# Patient Record
Sex: MALE | Race: BLACK OR AFRICAN AMERICAN | NOT HISPANIC OR LATINO | Employment: FULL TIME | URBAN - METROPOLITAN AREA
[De-identification: names, ages, dates, MRNs, and addresses within clinical notes are randomized per-mention and may not be internally consistent; named-entity substitution may affect disease eponyms.]

---

## 2023-10-03 PROBLEM — M19.90 OSTEOARTHRITIS: Status: ACTIVE | Noted: 2023-10-03

## 2023-10-03 PROBLEM — G89.29 CHRONIC PAIN OF RIGHT KNEE: Status: ACTIVE | Noted: 2023-10-03

## 2023-10-03 PROBLEM — R06.83 SNORING: Status: ACTIVE | Noted: 2023-10-03

## 2023-10-03 PROBLEM — Z01.818 PRE-TRANSPLANT EVALUATION FOR KIDNEY TRANSPLANT: Status: ACTIVE | Noted: 2023-10-03

## 2023-10-03 PROBLEM — J45.909 ASTHMA (HHS-HCC): Status: ACTIVE | Noted: 2023-10-03

## 2023-10-03 PROBLEM — Z94.9 TRANSPLANT: Status: ACTIVE | Noted: 2023-10-03

## 2023-10-03 PROBLEM — E55.9 VITAMIN D DEFICIENCY: Status: ACTIVE | Noted: 2023-10-03

## 2023-10-03 PROBLEM — E78.5 DYSLIPIDEMIA: Status: ACTIVE | Noted: 2023-10-03

## 2023-10-03 PROBLEM — E66.01 MORBID OBESITY (MULTI): Status: ACTIVE | Noted: 2023-10-03

## 2023-10-03 PROBLEM — N18.5 CKD (CHRONIC KIDNEY DISEASE), STAGE V (MULTI): Status: ACTIVE | Noted: 2023-10-03

## 2023-10-03 PROBLEM — Z95.828 ARTERIOVENOUS GRAFT FOR HEMODIALYSIS IN PLACE, PRIMARY: Status: ACTIVE | Noted: 2023-10-03

## 2023-10-03 PROBLEM — I10 HYPERTENSION: Status: ACTIVE | Noted: 2023-10-03

## 2023-10-03 PROBLEM — K21.9 ACID REFLUX: Status: ACTIVE | Noted: 2023-10-03

## 2023-10-03 PROBLEM — E11.9 TYPE 2 DIABETES MELLITUS (MULTI): Status: ACTIVE | Noted: 2023-10-03

## 2023-10-03 PROBLEM — M25.561 CHRONIC PAIN OF RIGHT KNEE: Status: ACTIVE | Noted: 2023-10-03

## 2023-10-03 RX ORDER — OXYCODONE HYDROCHLORIDE 5 MG/1
1 CAPSULE ORAL EVERY 6 HOURS PRN
COMMUNITY
Start: 2020-04-30

## 2023-10-03 RX ORDER — CLOPIDOGREL BISULFATE 75 MG/1
1 TABLET ORAL DAILY
COMMUNITY
Start: 2020-04-16 | End: 2024-02-20 | Stop reason: WASHOUT

## 2023-10-03 RX ORDER — ATORVASTATIN CALCIUM 80 MG/1
1 TABLET, FILM COATED ORAL DAILY
COMMUNITY
Start: 2018-03-12

## 2023-10-03 RX ORDER — SUCROFERRIC OXYHYDROXIDE 500 MG/1
500 TABLET, CHEWABLE ORAL
COMMUNITY
Start: 2022-06-08 | End: 2024-02-20 | Stop reason: WASHOUT

## 2023-10-03 RX ORDER — SEVELAMER CARBONATE 800 MG/1
2 TABLET, FILM COATED ORAL
COMMUNITY
Start: 2021-07-08 | End: 2024-02-20 | Stop reason: WASHOUT

## 2023-10-03 RX ORDER — CARVEDILOL 12.5 MG/1
1 TABLET ORAL 2 TIMES DAILY
COMMUNITY
Start: 2019-10-18 | End: 2023-11-02 | Stop reason: ALTCHOICE

## 2023-10-03 RX ORDER — SODIUM BICARBONATE 650 MG/1
2 TABLET ORAL 2 TIMES DAILY
COMMUNITY
Start: 2021-06-23

## 2023-10-03 RX ORDER — ALBUTEROL SULFATE 90 UG/1
2 AEROSOL, METERED RESPIRATORY (INHALATION) EVERY 4 HOURS PRN
COMMUNITY
Start: 2017-11-13

## 2023-10-03 RX ORDER — ATORVASTATIN CALCIUM 40 MG/1
1 TABLET, FILM COATED ORAL NIGHTLY
COMMUNITY
Start: 2018-03-12 | End: 2023-11-02 | Stop reason: ALTCHOICE

## 2023-10-03 RX ORDER — ZOLPIDEM TARTRATE 5 MG/1
0.5 TABLET ORAL NIGHTLY PRN
COMMUNITY
Start: 2021-07-17

## 2023-10-03 RX ORDER — LOSARTAN POTASSIUM 100 MG/1
50 TABLET ORAL DAILY
COMMUNITY

## 2023-10-03 RX ORDER — OMEPRAZOLE 40 MG/1
1 CAPSULE, DELAYED RELEASE ORAL DAILY
COMMUNITY
Start: 2021-07-15 | End: 2023-11-02 | Stop reason: ALTCHOICE

## 2023-10-03 RX ORDER — PANTOPRAZOLE SODIUM 20 MG/1
20 TABLET, DELAYED RELEASE ORAL 2 TIMES DAILY
COMMUNITY

## 2023-10-03 RX ORDER — HYDRALAZINE HYDROCHLORIDE 100 MG/1
1 TABLET, FILM COATED ORAL EVERY 12 HOURS
COMMUNITY
Start: 2021-07-08

## 2023-10-03 RX ORDER — PEN NEEDLE, DIABETIC 29 G X1/2"
NEEDLE, DISPOSABLE MISCELLANEOUS
COMMUNITY

## 2023-10-03 RX ORDER — ALBUTEROL SULFATE 0.63 MG/3ML
0.63 SOLUTION RESPIRATORY (INHALATION) EVERY 6 HOURS PRN
COMMUNITY
Start: 2021-09-29

## 2023-10-03 RX ORDER — AMLODIPINE BESYLATE 10 MG/1
1 TABLET ORAL DAILY
COMMUNITY
Start: 2020-02-03

## 2023-10-03 RX ORDER — TORSEMIDE 100 MG/1
1 TABLET ORAL DAILY
COMMUNITY
Start: 2021-12-09 | End: 2024-02-20 | Stop reason: WASHOUT

## 2023-10-03 RX ORDER — LANCETS
1 EACH MISCELLANEOUS 2 TIMES DAILY
COMMUNITY

## 2023-10-03 RX ORDER — BLOOD SUGAR DIAGNOSTIC
1 STRIP MISCELLANEOUS 4 TIMES DAILY
COMMUNITY
Start: 2019-03-22

## 2023-10-03 RX ORDER — MULTIVITAMIN
TABLET ORAL
COMMUNITY
End: 2024-02-20 | Stop reason: WASHOUT

## 2023-10-03 RX ORDER — CARVEDILOL 3.12 MG/1
1 TABLET ORAL 2 TIMES DAILY
COMMUNITY
End: 2023-11-02 | Stop reason: ALTCHOICE

## 2023-10-03 RX ORDER — ASPIRIN 325 MG
1 TABLET, DELAYED RELEASE (ENTERIC COATED) ORAL
COMMUNITY
Start: 2017-12-18 | End: 2023-11-02 | Stop reason: ALTCHOICE

## 2023-10-05 ENCOUNTER — APPOINTMENT (OUTPATIENT)
Dept: CARDIOLOGY | Facility: CLINIC | Age: 38
End: 2023-10-05

## 2023-11-02 ENCOUNTER — OFFICE VISIT (OUTPATIENT)
Dept: CARDIOLOGY | Facility: CLINIC | Age: 38
End: 2023-11-02
Payer: COMMERCIAL

## 2023-11-02 VITALS
HEART RATE: 78 BPM | DIASTOLIC BLOOD PRESSURE: 70 MMHG | HEIGHT: 72 IN | WEIGHT: 315 LBS | OXYGEN SATURATION: 95 % | SYSTOLIC BLOOD PRESSURE: 147 MMHG | BODY MASS INDEX: 42.66 KG/M2

## 2023-11-02 DIAGNOSIS — N18.5 CKD (CHRONIC KIDNEY DISEASE), STAGE V (MULTI): ICD-10-CM

## 2023-11-02 DIAGNOSIS — Z01.810 PREOP CARDIOVASCULAR EXAM: ICD-10-CM

## 2023-11-02 DIAGNOSIS — I10 PRIMARY HYPERTENSION: ICD-10-CM

## 2023-11-02 DIAGNOSIS — Z01.818 PRE-TRANSPLANT EVALUATION FOR KIDNEY TRANSPLANT: Primary | ICD-10-CM

## 2023-11-02 PROCEDURE — 93010 ELECTROCARDIOGRAM REPORT: CPT | Performed by: INTERNAL MEDICINE

## 2023-11-02 PROCEDURE — 3077F SYST BP >= 140 MM HG: CPT | Performed by: INTERNAL MEDICINE

## 2023-11-02 PROCEDURE — 99214 OFFICE O/P EST MOD 30 MIN: CPT | Performed by: INTERNAL MEDICINE

## 2023-11-02 PROCEDURE — 93005 ELECTROCARDIOGRAM TRACING: CPT | Performed by: INTERNAL MEDICINE

## 2023-11-02 PROCEDURE — 4010F ACE/ARB THERAPY RXD/TAKEN: CPT | Performed by: INTERNAL MEDICINE

## 2023-11-02 PROCEDURE — 3078F DIAST BP <80 MM HG: CPT | Performed by: INTERNAL MEDICINE

## 2023-11-02 RX ORDER — PREDNISONE 10 MG/1
TABLET ORAL
COMMUNITY
Start: 2023-01-11 | End: 2024-02-20 | Stop reason: WASHOUT

## 2023-11-02 RX ORDER — SEMAGLUTIDE 0.68 MG/ML
0.25 INJECTION, SOLUTION SUBCUTANEOUS
COMMUNITY
Start: 2023-09-10

## 2023-11-02 RX ORDER — TIZANIDINE 4 MG/1
4 TABLET ORAL EVERY 8 HOURS PRN
COMMUNITY
Start: 2023-01-11 | End: 2024-02-20 | Stop reason: WASHOUT

## 2023-11-02 RX ORDER — ERGOCALCIFEROL 1.25 MG/1
CAPSULE ORAL
COMMUNITY
Start: 2023-07-19

## 2023-11-02 RX ORDER — INSULIN GLARGINE-YFGN 100 [IU]/ML
INJECTION, SOLUTION SUBCUTANEOUS
COMMUNITY
Start: 2023-10-25

## 2023-11-02 RX ORDER — CARVEDILOL 25 MG/1
25 TABLET ORAL
COMMUNITY

## 2023-11-02 ASSESSMENT — COLUMBIA-SUICIDE SEVERITY RATING SCALE - C-SSRS
2. HAVE YOU ACTUALLY HAD ANY THOUGHTS OF KILLING YOURSELF?: NO
6. HAVE YOU EVER DONE ANYTHING, STARTED TO DO ANYTHING, OR PREPARED TO DO ANYTHING TO END YOUR LIFE?: NO
1. IN THE PAST MONTH, HAVE YOU WISHED YOU WERE DEAD OR WISHED YOU COULD GO TO SLEEP AND NOT WAKE UP?: NO

## 2023-11-02 ASSESSMENT — PATIENT HEALTH QUESTIONNAIRE - PHQ9
SUM OF ALL RESPONSES TO PHQ9 QUESTIONS 1 AND 2: 0
1. LITTLE INTEREST OR PLEASURE IN DOING THINGS: NOT AT ALL
2. FEELING DOWN, DEPRESSED OR HOPELESS: NOT AT ALL

## 2023-11-02 ASSESSMENT — PAIN SCALES - GENERAL: PAINLEVEL: 0-NO PAIN

## 2023-11-02 ASSESSMENT — ENCOUNTER SYMPTOMS
OCCASIONAL FEELINGS OF UNSTEADINESS: 0
LOSS OF SENSATION IN FEET: 0
DEPRESSION: 0

## 2023-11-02 NOTE — PROGRESS NOTES
Subjective   Tanmay Mcneill is a 38 y.o. male who presents to the [facility] for cardiac preoperative evaluation for kidney transplant.     He has no active cardiac symptoms of chest pain, dyspnea on exertion, PND, orthopnea, DRAKE, palpitations, syncope, or claudication.     Exercises routinely and works as a  with exertion > 4 METs with moving objects at work and climbing stairs.    Past Medical History:  1. ESRD on HD  2. Hypertension  3. Dyslipidemia  4. Type 2 diabetes mellitus  5.     Social History:  Former tobacco use, quit at age 30 yo    Family History:  Family History   Problem Relation Name Age of Onset    COPD Mother      Diabetes Mother      Hypertension Mother      Kidney failure Father      Kidney disease Father          stage IV       Review of Systems    A 14 point review of systems was asked. All questions were negative except for pertinent positives listed in the HPI.     Current Outpatient Medications on File Prior to Visit   Medication Sig Dispense Refill    albuterol (Ventolin HFA) 90 mcg/actuation inhaler Inhale 2 puffs every 4 hours if needed.      albuterol 0.63 mg/3 mL nebulizer solution Take 3 mL (0.63 mg) by nebulization every 6 hours if needed for wheezing or shortness of breath.      amLODIPine (Norvasc) 10 mg tablet Take 1 tablet (10 mg) by mouth once daily.      atorvastatin (Lipitor) 80 mg tablet Take 1 tablet (80 mg) by mouth once daily.      blood sugar diagnostic (Accu-Chek Celi Plus test strp) strip 1 strip 4 times a day.      carvedilol (Coreg) 25 mg tablet Take 1 tablet (25 mg) by mouth 2 times a day with meals.      clopidogrel (Plavix) 75 mg tablet Take 1 tablet (75 mg) by mouth once daily.      ergocalciferol (Vitamin D-2) 1.25 MG (81616 UT) capsule TAKE 1 CAPSULE BY MOUTH ONCE A WEEK FOR 16 DOSES      hydrALAZINE (Apresoline) 100 mg tablet Take 1 tablet (100 mg) by mouth every 12 hours.      lancets (OneTouch UltraSoft Lancets) misc 1 Lancet 2  "times a day.      losartan (Cozaar) 100 mg tablet Take 1 tablet (100 mg) by mouth once daily.      multivitamin (Daily Multi-Vitamin) tablet Take by mouth.      Ozempic 0.25 mg or 0.5 mg (2 mg/3 mL) pen injector Inject 0.25 mg under the skin 1 (one) time per week.      pen needle, diabetic 31 gauge x 1/4\" needle       Semglee,insulin glarg-yfgn,Pen 100 unit/mL (3 mL) Pen       sodium bicarbonate 650 mg tablet Take 2 tablets (1,300 mg) by mouth 2 times a day.      torsemide (Demadex) 100 mg tablet Take 1 tablet (100 mg) by mouth once daily.      zolpidem (Ambien) 5 mg tablet Take 0.5 tablets (2.5 mg) by mouth as needed at bedtime.      FUROSEMIDE ORAL Take by mouth twice a day.      oxyCODONE (Oxy-IR) 5 mg immediate release capsule Take 1 capsule (5 mg) by mouth every 6 hours if needed.      pantoprazole (ProtoNix) 20 mg EC tablet Take 1 tablet (20 mg) by mouth twice a day.      predniSONE (Deltasone) 10 mg tablet       sevelamer carbonate (Renvela) 800 mg tablet Take 2 tablets (1,600 mg) by mouth 3 times a day with meals.      sucroferric oxyhydroxide (Velphoro) 500 mg tablet,chewable chewable tablet Chew.      tiZANidine (Zanaflex) 4 mg tablet Take 1 tablet (4 mg) by mouth every 8 hours if needed.      [DISCONTINUED] atorvastatin (Lipitor) 40 mg tablet Take 1 tablet (40 mg) by mouth once daily at bedtime.      [DISCONTINUED] carvedilol (Coreg) 12.5 mg tablet Take 1 tablet (12.5 mg) by mouth 2 times a day.      [DISCONTINUED] carvedilol (Coreg) 3.125 mg tablet Take 1 tablet (3.125 mg) by mouth twice a day.      [DISCONTINUED] cholecalciferol (Vitamin D-3) 1,250 mcg (50,000 unit) capsule Take 1 capsule (50,000 Units) by mouth every 30 (thirty) days.      [DISCONTINUED] omeprazole (PriLOSEC) 40 mg DR capsule Take 1 capsule (40 mg) by mouth once daily.       No current facility-administered medications on file prior to visit.          Objective   Physical Exam  BP Readings from Last 3 Encounters:   11/02/23 147/70 " "  22 155/83   10/29/21 (!) 185/95      Wt Readings from Last 3 Encounters:   23 (!) 152 kg (335 lb 8 oz)   22 (!) 149 kg (328 lb)   10/29/21 (!) 158 kg (348 lb 9 oz)      BMI: Estimated body mass index is 45.5 kg/m² as calculated from the following:    Height as of this encounter: 1.829 m (6').    Weight as of this encounter: 152 kg (335 lb 8 oz).  BSA: Estimated body surface area is 2.78 meters squared as calculated from the following:    Height as of this encounter: 1.829 m (6').    Weight as of this encounter: 152 kg (335 lb 8 oz).    General: no acute distress  HEENT: EOMI, no scleral icterus.  Lungs: Clear to auscultation bilaterally without wheezing, rales, or rhonchi.  Cardiovascular: Regular rhythm and rate. Normal S1 and S2. No murmurs, rubs, or gallops are appreciated. JVP normal.  Abdomen: Soft, nontender, nondistended. Bowel sounds present.  Extremities: Warm and well perfused with equal 2+ pulses bilaterally.  No edema present.  Neurologic: Alert and oriented x3.    I have personally reviewed the following images and laboratory findings:  Last echocardiogram: 2023 echo: LV EF 60-65%, mild conc LVH (LVMI 119 gm/m2), indeterminate vs pseudonormal diastology (E/e' 16), normal LA size (ORIN 27 ml/m2), normal RV/RA, no AI, trace MR, mild TR, RVSP 34 mm Hg (RAP 8 mm Hg)    Last cath / stress test: 2023 SPECT nuclear: \"Questionable mild fixed perfusion defect involving the mid to basal  lateral wall suggestive of prior infarct. Additional mild fixed  perfusion defect involving the inferior wall may be related to bowel  artifact. Prior infarct cannot be excluded.\"    2022 SPECT nuclear stress test: No myocardial ischemia or scar. LV EF 51%    2021 CT calcium score: zero    Most recent EC2023 ECG: Sinus rhythm, 79 bpm, normal ECG.     Assessment/Plan   1. Preoperative evaluation:  SPECT nuclear 2023 study with artifact vs new silent cardiac infarcts in inferior " and anterior wall of the heart. This would be unusually for 2021 CT calcium score zero but has multiple CAD risk factors.    I favor ordering a stress MRI cardiac protocol instead of coronary angiogram to assess. If there is inferior/anterior wall scarring this will present as endomyocardial pattern late gadolinium enhancement on MRI.    If cardiac MRI imaging is normal, Mr. Mcneill is a low risk patient for renal transplant surgery from a heart standpoint. Normal heart exam today. Able to perform physical activity > 4 METs.        SIGNATURE: Robin Villegas MD PATIENT NAME: Tanmay Mcneill   DATE/TIME: November 2, 2023 3:46 PM MRN: [id number]

## 2023-11-13 LAB
ATRIAL RATE: 79 BPM
P AXIS: 85 DEGREES
P OFFSET: 201 MS
P ONSET: 144 MS
PR INTERVAL: 154 MS
Q ONSET: 221 MS
QRS COUNT: 13 BEATS
QRS DURATION: 82 MS
QT INTERVAL: 394 MS
QTC CALCULATION(BAZETT): 451 MS
QTC FREDERICIA: 432 MS
R AXIS: 82 DEGREES
T AXIS: 40 DEGREES
T OFFSET: 418 MS
VENTRICULAR RATE: 79 BPM

## 2023-12-19 ENCOUNTER — HOSPITAL ENCOUNTER (OUTPATIENT)
Dept: RADIOLOGY | Facility: HOSPITAL | Age: 38
End: 2023-12-19
Payer: COMMERCIAL

## 2024-01-15 ENCOUNTER — TELEPHONE (OUTPATIENT)
Dept: TRANSPLANT | Facility: HOSPITAL | Age: 39
End: 2024-01-15
Payer: COMMERCIAL

## 2024-01-15 NOTE — TELEPHONE ENCOUNTER
Talked to pt today, explained the GFR letter - his stated he did not gain more time.    He has his upcoming cMRI at the beginning of February.    He is aware he needs updated provider visits, these were tasked out to CHARLENE Catalan.   Retinoid Dermatitis Aggressive Treatment: I recommended more frequent application of Cetaphil or CeraVe to the areas of dermatitis. I also prescribed a topical steroid for twice daily use until the dermatitis resolves.

## 2024-01-16 ENCOUNTER — TELEPHONE (OUTPATIENT)
Dept: TRANSPLANT | Facility: HOSPITAL | Age: 39
End: 2024-01-16
Payer: COMMERCIAL

## 2024-02-06 ENCOUNTER — HOSPITAL ENCOUNTER (OUTPATIENT)
Dept: RADIOLOGY | Facility: HOSPITAL | Age: 39
End: 2024-02-06

## 2024-02-20 ENCOUNTER — OFFICE VISIT (OUTPATIENT)
Dept: TRANSPLANT | Facility: HOSPITAL | Age: 39
End: 2024-02-20
Payer: COMMERCIAL

## 2024-02-20 ENCOUNTER — DOCUMENTATION (OUTPATIENT)
Dept: TRANSPLANT | Facility: HOSPITAL | Age: 39
End: 2024-02-20
Payer: COMMERCIAL

## 2024-02-20 VITALS
WEIGHT: 315 LBS | OXYGEN SATURATION: 97 % | SYSTOLIC BLOOD PRESSURE: 152 MMHG | HEART RATE: 79 BPM | DIASTOLIC BLOOD PRESSURE: 79 MMHG | BODY MASS INDEX: 47.44 KG/M2 | TEMPERATURE: 97.9 F

## 2024-02-20 VITALS
OXYGEN SATURATION: 97 % | TEMPERATURE: 97.9 F | HEART RATE: 79 BPM | BODY MASS INDEX: 47.44 KG/M2 | DIASTOLIC BLOOD PRESSURE: 79 MMHG | WEIGHT: 315 LBS | SYSTOLIC BLOOD PRESSURE: 152 MMHG

## 2024-02-20 DIAGNOSIS — N18.6 ESRD (END STAGE RENAL DISEASE) (MULTI): Primary | ICD-10-CM

## 2024-02-20 DIAGNOSIS — Z01.818 PRE-TRANSPLANT EVALUATION FOR KIDNEY TRANSPLANT: ICD-10-CM

## 2024-02-20 PROCEDURE — 1036F TOBACCO NON-USER: CPT | Performed by: INTERNAL MEDICINE

## 2024-02-20 PROCEDURE — 3078F DIAST BP <80 MM HG: CPT | Performed by: INTERNAL MEDICINE

## 2024-02-20 PROCEDURE — 4010F ACE/ARB THERAPY RXD/TAKEN: CPT | Performed by: TRANSPLANT SURGERY

## 2024-02-20 PROCEDURE — 99215 OFFICE O/P EST HI 40 MIN: CPT | Performed by: TRANSPLANT SURGERY

## 2024-02-20 PROCEDURE — 99215 OFFICE O/P EST HI 40 MIN: CPT | Performed by: INTERNAL MEDICINE

## 2024-02-20 PROCEDURE — 3077F SYST BP >= 140 MM HG: CPT | Performed by: TRANSPLANT SURGERY

## 2024-02-20 PROCEDURE — 1036F TOBACCO NON-USER: CPT | Performed by: TRANSPLANT SURGERY

## 2024-02-20 PROCEDURE — 3078F DIAST BP <80 MM HG: CPT | Performed by: TRANSPLANT SURGERY

## 2024-02-20 PROCEDURE — 4010F ACE/ARB THERAPY RXD/TAKEN: CPT | Performed by: INTERNAL MEDICINE

## 2024-02-20 PROCEDURE — 3077F SYST BP >= 140 MM HG: CPT | Performed by: INTERNAL MEDICINE

## 2024-02-20 SDOH — ECONOMIC STABILITY: HOUSING INSECURITY
IN THE LAST 12 MONTHS, WAS THERE A TIME WHEN YOU DID NOT HAVE A STEADY PLACE TO SLEEP OR SLEPT IN A SHELTER (INCLUDING NOW)?: NO

## 2024-02-20 SDOH — ECONOMIC STABILITY: TRANSPORTATION INSECURITY
IN THE PAST 12 MONTHS, HAS THE LACK OF TRANSPORTATION KEPT YOU FROM MEDICAL APPOINTMENTS OR FROM GETTING MEDICATIONS?: NO

## 2024-02-20 SDOH — ECONOMIC STABILITY: FOOD INSECURITY: WITHIN THE PAST 12 MONTHS, THE FOOD YOU BOUGHT JUST DIDN'T LAST AND YOU DIDN'T HAVE MONEY TO GET MORE.: NEVER TRUE

## 2024-02-20 SDOH — ECONOMIC STABILITY: INCOME INSECURITY: IN THE LAST 12 MONTHS, WAS THERE A TIME WHEN YOU WERE NOT ABLE TO PAY THE MORTGAGE OR RENT ON TIME?: NO

## 2024-02-20 SDOH — ECONOMIC STABILITY: FOOD INSECURITY: WITHIN THE PAST 12 MONTHS, YOU WORRIED THAT YOUR FOOD WOULD RUN OUT BEFORE YOU GOT MONEY TO BUY MORE.: NEVER TRUE

## 2024-02-20 SDOH — ECONOMIC STABILITY: TRANSPORTATION INSECURITY
IN THE PAST 12 MONTHS, HAS LACK OF TRANSPORTATION KEPT YOU FROM MEETINGS, WORK, OR FROM GETTING THINGS NEEDED FOR DAILY LIVING?: NO

## 2024-02-20 SDOH — ECONOMIC STABILITY: HOUSING INSECURITY: IN THE LAST 12 MONTHS, HOW MANY PLACES HAVE YOU LIVED?: 1

## 2024-02-20 ASSESSMENT — ENCOUNTER SYMPTOMS
ABDOMINAL DISTENTION: 0
FEVER: 0
ARTHRALGIAS: 0
LIGHT-HEADEDNESS: 0
FREQUENCY: 0
ADENOPATHY: 0
ABDOMINAL PAIN: 0
CONFUSION: 0
COUGH: 0
HALLUCINATIONS: 0
DIARRHEA: 0
EYES NEGATIVE: 1
HEMATURIA: 0
DIZZINESS: 0
CHILLS: 0
COLOR CHANGE: 0
WEAKNESS: 0
CONSTIPATION: 0
DYSURIA: 0
AGITATION: 0
SHORTNESS OF BREATH: 0

## 2024-02-20 ASSESSMENT — LIFESTYLE VARIABLES: HOW OFTEN DO YOU HAVE A DRINK CONTAINING ALCOHOL: NEVER

## 2024-02-20 ASSESSMENT — PAIN SCALES - GENERAL
PAINLEVEL: 0-NO PAIN
PAINLEVEL: 0-NO PAIN

## 2024-02-20 ASSESSMENT — SOCIAL DETERMINANTS OF HEALTH (SDOH): HOW HARD IS IT FOR YOU TO PAY FOR THE VERY BASICS LIKE FOOD, HOUSING, MEDICAL CARE, AND HEATING?: NOT HARD AT ALL

## 2024-02-20 NOTE — PROGRESS NOTES
Patient attended appointment on 02/20/2024 with Dr. Feliz and Dr. Velasco.  Medications and allergies reviewed with the patient.  Patient ambulated.  Patient is tolerating dialysis well.  Health screenings reviewed.   Recent hospitalizations:  No.  Blood transfusions:  No.  Falls:  No.    Comments:  Pt will get updated labs and CXR this week.  Will review cardiac protocol for any further testing he needs in that respect.  He will be scheduled for a SW visit also.

## 2024-02-21 NOTE — PROGRESS NOTES
Subjective   Tanmay Mcneill is a 38 y.o. male who presents for continued evaluation for kidney transplant. He was initially seen 2 years ago. He has done some cardiac testing and met a cardiologist. Summary below. He denies any chest pain or shortness of breath.  Since HD, he stated his insulin requirement has reduced to minimal.  He is losing some weight and recently started Ozempic      Review of Systems   Constitutional:  Negative for chills and fever.   HENT: Negative.  Negative for congestion.    Eyes: Negative.    Respiratory:  Negative for cough and shortness of breath.    Cardiovascular:  Negative for chest pain.   Gastrointestinal:  Negative for abdominal distention, abdominal pain, constipation and diarrhea.   Endocrine: Negative for cold intolerance and heat intolerance.   Genitourinary:  Negative for dysuria, frequency, hematuria and urgency.   Musculoskeletal:  Negative for arthralgias.   Skin:  Negative for color change.   Allergic/Immunologic: Negative for environmental allergies.   Neurological:  Negative for dizziness, weakness and light-headedness.   Hematological:  Negative for adenopathy.   Psychiatric/Behavioral:  Negative for agitation, confusion and hallucinations.         Objective   Vitals:    02/20/24 1106   BP: 152/79   Pulse: 79   Temp: 36.6 °C (97.9 °F)   SpO2: 97%       Physical Exam  Constitutional:       Appearance: Normal appearance.   HENT:      Head: Normocephalic and atraumatic.      Nose: Nose normal.   Eyes:      Pupils: Pupils are equal, round, and reactive to light.   Cardiovascular:      Rate and Rhythm: Normal rate.   Pulmonary:      Effort: Pulmonary effort is normal. No respiratory distress.   Abdominal:      General: There is no distension.      Palpations: Abdomen is soft. There is no mass.      Comments: Truncal obesity  Weight distributed mostly in the abdomen   Musculoskeletal:         General: No swelling. Normal range of motion.      Cervical back: Normal range of  "motion.   Skin:     General: Skin is warm and dry.   Neurological:      General: No focal deficit present.      Mental Status: He is alert and oriented to person, place, and time.   Psychiatric:         Mood and Affect: Mood normal.         Behavior: Behavior normal.          Lab Review    Blood Type: No results found for: \"ABORH\"  Lab Results   Component Value Date    CREATININE 15.17 (H) 10/29/2021    K 5.0 06/23/2020    GLUCOSE 116 (H) 06/23/2020    HCT 22.2 (L) 01/21/2022    WBC 8.5 10/29/2021     10/29/2021    CALCIUM 7.9 (L) 06/23/2020     Lab Results   Component Value Date    WBC 8.5 10/29/2021    HGB 6.5 (LL) 01/21/2022    HCT 22.2 (L) 01/21/2022    MCV 76 (L) 10/29/2021     10/29/2021     Lab Results   Component Value Date    GLUCOSE 116 (H) 06/23/2020    CALCIUM 7.9 (L) 06/23/2020     06/23/2020    K 5.0 06/23/2020    CO2 22 06/23/2020     (H) 06/23/2020    BUN 71 (H) 10/29/2021    CREATININE 15.17 (H) 10/29/2021       Current Outpatient Medications:     albuterol (Ventolin HFA) 90 mcg/actuation inhaler, Inhale 2 puffs every 4 hours if needed., Disp: , Rfl:     albuterol 0.63 mg/3 mL nebulizer solution, Take 3 mL (0.63 mg) by nebulization every 6 hours if needed for wheezing or shortness of breath., Disp: , Rfl:     amLODIPine (Norvasc) 10 mg tablet, Take 1 tablet (10 mg) by mouth once daily., Disp: , Rfl:     atorvastatin (Lipitor) 80 mg tablet, Take 1 tablet (80 mg) by mouth once daily., Disp: , Rfl:     blood sugar diagnostic (Accu-Chek Celi Plus test strp) strip, 1 strip 4 times a day., Disp: , Rfl:     carvedilol (Coreg) 25 mg tablet, Take 1 tablet (25 mg) by mouth 2 times a day with meals., Disp: , Rfl:     ergocalciferol (Vitamin D-2) 1.25 MG (97628 UT) capsule, TAKE 1 CAPSULE BY MOUTH ONCE A WEEK FOR 16 DOSES, Disp: , Rfl:     FUROSEMIDE ORAL, Take by mouth twice a day., Disp: , Rfl:     hydrALAZINE (Apresoline) 100 mg tablet, Take 1 tablet (100 mg) by mouth every 12 " "hours., Disp: , Rfl:     lancets (OneTouch UltraSoft Lancets) misc, 1 Lancet 2 times a day., Disp: , Rfl:     losartan (Cozaar) 100 mg tablet, Take 0.5 tablets (50 mg) by mouth once daily., Disp: , Rfl:     oxyCODONE (Oxy-IR) 5 mg immediate release capsule, Take 1 capsule (5 mg) by mouth every 6 hours if needed., Disp: , Rfl:     Ozempic 0.25 mg or 0.5 mg (2 mg/3 mL) pen injector, Inject 0.25 mg under the skin 1 (one) time per week., Disp: , Rfl:     pantoprazole (ProtoNix) 20 mg EC tablet, Take 1 tablet (20 mg) by mouth twice a day., Disp: , Rfl:     pen needle, diabetic 31 gauge x 1/4\" needle, , Disp: , Rfl:     Semglee,insulin glarg-yfgn,Pen 100 unit/mL (3 mL) Pen, , Disp: , Rfl:     sodium bicarbonate 650 mg tablet, Take 2 tablets (1,300 mg) by mouth 2 times a day., Disp: , Rfl:     sucroferric oxyhydroxide (Velphoro) 500 mg tablet,chewable chewable tablet, Chew 1 tablet (500 mg) 3 times a day., Disp: , Rfl:     zolpidem (Ambien) 5 mg tablet, Take 0.5 tablets (2.5 mg) by mouth as needed at bedtime., Disp: , Rfl:      2021  Coronary artery calcium 0     Echocardiogram 8/1/2023  CONCLUSIONS:  1. Left ventricular systolic function is normal with a 60-65% estimated ejection fraction.  2. Spectral Doppler shows a pseudonormal pattern of left ventricular diastolic filling.  3. There is mild eccentric left ventricular hypertrophy.  4. Mildly elevated RVSP.  5. The maximum TR velocity (and hence, PA systolic pressure) is likely underestimated.    Nuclear stress Test 7/20/2023, Regadenoson  IMPRESSION:  Questionable mild fixed perfusion defect involving the mid to basal  lateral wall suggestive of prior infarct. Additional mild fixed  perfusion defect involving the inferior wall may be related to bowel  artifact. Prior infarct cannot be excluded.     The left ventricle is moderately dilated with EDV of 165 mL,  previously 226 mL.     Normal LV wall motion with an LV EF estimated a post-stress 52%    From Dr. Villegas " (cardiology)  1. Preoperative evaluation:  SPECT nuclear 7/20/2023 study with artifact vs new silent cardiac infarcts in inferior and anterior wall of the heart. This would be unusually for 2021 CT calcium score zero but has multiple CAD risk factors.  I favor ordering a stress MRI cardiac protocol instead of coronary angiogram to assess. If there is inferior/anterior wall scarring this will present as endomyocardial pattern late gadolinium enhancement on MRI.  If cardiac MRI imaging is normal, Mr. Mcneill is a low risk patient for renal transplant surgery from a heart standpoint. Normal heart exam today. Able to perform physical activity > 4 METs.     Stress MRI with Regadenoson: Pending      Assessment/Plan    Diagnoses: ESRD  Tanmay Mcneill is a reasonable candidate for kidney transplant.    He has lost some weight with BMI 44 from 46 prior. He just started on Ozempic which will further aid his weight loss. There is central obesity but body habitus still feasible for transplant.  His cardiac work up is near complete, results summarized above. Need stress MRI for final clearance.

## 2024-02-22 ENCOUNTER — DOCUMENTATION (OUTPATIENT)
Dept: TRANSPLANT | Facility: HOSPITAL | Age: 39
End: 2024-02-22
Payer: COMMERCIAL

## 2024-02-22 NOTE — PROGRESS NOTES
Tasked out testing for assistance with scheduling today.    Pt has already seen cardiology - they will comment on his risk strat after the cMRI is completed.

## 2024-02-22 NOTE — PROGRESS NOTES
TRANSPLANT NEPHROLOGY CONSULT :   KIDNEY TRANSPLANT RECIPIENT EVALUATION        SERVICE DATE: 02/20/2024     REASON FOR CONSULT/CHIEF COMPLAINT:    FOR KIDNEY TRANSPLANT RECIPIENT EVALUATION.    HPI:    Mr. Mcneill is a 38 y.o. male with ESRD sec to DM2, on HD since 1/19/2022 ([facility]– [place], JOSE, BASILIO DUARTE, Dr. Rhodes), HTN, obesity, asthma, arthritis and other PMH as listed below here for pre-transplant eval.     Pt was last seen by renal for pre-txp eval in 10/2021. Pt was deemed suitable candidate at the time. Was advised to lose wt. BMI at the time ~46.    Pt has been listed as status 7 since 9/2022.    Pt with h/o DM x12yrs, HTN X7 yrs. Gradual decline in renal function.     Tolerating HD well currently. No recent ER visits or admission.     Recently started on Ozempic. Lost approx 35 lbs wt. Most recent BMI 47.   Last A1C 7.6% 6/2023.    Has had cardiac eval in the interim. Seen by Dr. Villegas with cardiology who recommended cardiac stress MRI for further eval of mild perfusion defects in basal lateral and inferior walls seen on nuclear stress test 7/2023. Pt was deemed overall low risk.     Pt currently w/o any cardiac symptoms. Denies any chest pain, BEAULIEU, orthopnea, PND, palpitations, persistent LE swelling.     No potential donors at this time.     Quit smoking in 2020. Reports occ alcohol use. No other substance abuse reported.     Makes very little urine. Inter-dialytic wt gains of 2-3 kg consistently. Able to get down to dry wt without difficulty.     Fully functional. Able to perform all ADLs, IADLs.    The patient is here for kidney transplant recipient evaluation. Mr. Mcneill has had multiple complications from end stage severe renal disease including anemia, secondary hyperparathyroidism, and osteodystrophy. The patient is here today for an evaluation for kidney transplantation to improve quality of life and decrease the risk of cardiovascular disease, coronary artery disease and stroke.     The  patient is doing well without complaints. Denied chest pain, shortness of breath, palpitation, dyspnea on exertion, dysuria, fever, nausea, vomiting, diarrhea and flu-liked symptoms. No swelling of the extremities. No recent hospitalization or ED visit.      ROS:  Review of  14 systems was performed system by system. See HPI. Otherwise, the symptoms were negative.    PAST MEDICAL HISTORY:  No past medical history on file.     PAST SURGICAL HISTORY:  Past Surgical History:   Procedure Laterality Date    OTHER SURGICAL HISTORY  12/18/2017    History Of Prior Surgery    OTHER SURGICAL HISTORY  04/08/2020    Hip surgery        SOCIAL HISTORY:  Social History     Socioeconomic History    Marital status: Single     Spouse name: Not on file    Number of children: Not on file    Years of education: Not on file    Highest education level: Not on file   Occupational History    Not on file   Tobacco Use    Smoking status: Never     Passive exposure: Never    Smokeless tobacco: Never   Vaping Use    Vaping Use: Never used   Substance and Sexual Activity    Alcohol use: Never    Drug use: Never    Sexual activity: Not on file   Other Topics Concern    Not on file   Social History Narrative    Not on file     Social Determinants of Health     Financial Resource Strain: Low Risk  (2/20/2024)    Overall Financial Resource Strain (CARDIA)     Difficulty of Paying Living Expenses: Not hard at all   Food Insecurity: No Food Insecurity (2/20/2024)    Hunger Vital Sign     Worried About Running Out of Food in the Last Year: Never true     Ran Out of Food in the Last Year: Never true   Transportation Needs: No Transportation Needs (2/20/2024)    PRAPARE - Transportation     Lack of Transportation (Medical): No     Lack of Transportation (Non-Medical): No   Physical Activity: Not on file   Stress: Not on file   Social Connections: Not on file   Intimate Partner Violence: Not on file   Housing Stability: Low Risk  (2/20/2024)    Housing  "Stability Vital Sign     Unable to Pay for Housing in the Last Year: No     Number of Places Lived in the Last Year: 1     Unstable Housing in the Last Year: No       FAMILY HISTORY:  Family History   Problem Relation Name Age of Onset    COPD Mother      Diabetes Mother      Hypertension Mother      Kidney failure Father      Kidney disease Father          stage IV       MEDICATION LIST:  Current Outpatient Medications   Medication Instructions    albuterol (Ventolin HFA) 90 mcg/actuation inhaler 2 puffs, inhalation, Every 4 hours PRN    albuterol 0.63 mg, nebulization, Every 6 hours PRN    amLODIPine (Norvasc) 10 mg tablet 1 tablet, oral, Daily    atorvastatin (Lipitor) 80 mg tablet 1 tablet, oral, Daily    blood sugar diagnostic (Accu-Chek Celi Plus test strp) strip 1 strip, 4 times daily    carvedilol (COREG) 25 mg, oral, 2 times daily with meals    ergocalciferol (Vitamin D-2) 1.25 MG (25828 UT) capsule TAKE 1 CAPSULE BY MOUTH ONCE A WEEK FOR 16 DOSES    FUROSEMIDE ORAL oral, 2 times daily    hydrALAZINE (Apresoline) 100 mg tablet 1 tablet, oral, Every 12 hours    lancets (OneTouch UltraSoft Lancets) misc 1 Lancet, miscellaneous, 2 times daily    losartan (COZAAR) 50 mg, oral, Daily    oxyCODONE (Oxy-IR) 5 mg immediate release capsule 1 capsule, oral, Every 6 hours PRN    Ozempic 0.25 mg, subcutaneous, Weekly    pantoprazole (PROTONIX) 20 mg, oral, 2 times daily    pen needle, diabetic 31 gauge x 1/4\" needle miscellaneous    Semglee,insulin glarg-yfgn,Pen 100 unit/mL (3 mL) Pen     sodium bicarbonate 650 mg tablet 2 tablets, oral, 2 times daily    sucroferric oxyhydroxide (VELPHORO) 500 mg, oral, 3 times daily    zolpidem (Ambien) 5 mg tablet 0.5 tablets, oral, Nightly PRN       ALLERGY  No Known Allergies    PHYSICAL EXAM:    Visit Vitals  /79   Pulse 79   Temp 36.6 °C (97.9 °F) (Temporal)   Wt (!) 159 kg (349 lb 12.8 oz)   SpO2 97%   BMI 47.44 kg/m²   Smoking Status Never   BSA 2.84 m²        General " Appearance - NAD, Good speech, oriented and alert, obese  HEENT - Supple. Not pale. No jaundice. No cervical lymphadenopathy.   CVS - RRR. Normal S1/S2. No murmur, click , rub or gallop  Lungs- clear to auscultation bilaterally  Abdomen - soft , not tender, no guarding, no rigidity. No masses and ascites.   Musculoskeletal /Extremities - no edema. Full ROM. No joint tenderness.   Neuro/Psych - appropriate mood and affect. Motor power V/V all extremities. CN I -XII were grossly intact.  Skin - No visible rash  Dialysis access : RUE AVG is clean, dry and intact. No signs of infection.    LABS:    Lab Results   Component Value Date    WBC 8.5 10/29/2021    HGB 6.5 (LL) 01/21/2022    HCT 22.2 (L) 01/21/2022     10/29/2021    CHOL 162 03/12/2018    TRIG 294 (H) 03/12/2018    HDL 27.1 (A) 03/12/2018    ALT 11 10/29/2021    AST 11 10/29/2021     06/23/2020    K 5.0 06/23/2020     (H) 06/23/2020    CREATININE 15.17 (H) 10/29/2021    BUN 71 (H) 10/29/2021    CO2 22 06/23/2020    TSH 0.50 03/12/2018    HGBA1C 7.6 (H) 06/27/2023       EKG:  Encounter Date: 11/02/23   ECG 12 lead (Clinic Performed)   Result Value    Ventricular Rate 79    Atrial Rate 79    AK Interval 154    QRS Duration 82    QT Interval 394    QTC Calculation(Bazett) 451    P Axis 85    R Axis 82    T Axis 40    QRS Count 13    Q Onset 221    P Onset 144    P Offset 201    T Offset 418    QTC Fredericia 432    Narrative    Normal sinus rhythm  Normal ECG  When compared with ECG of 12-APR-2022 10:56,  No significant change was found  Confirmed by Vinod Naik ([id number]) on 11/13/2023 4:21:16 PM       Echocardiogram:   Echocardiogram 8/2023  CONCLUSIONS:  1. Left ventricular systolic function is normal with a 60-65% estimated ejection fraction.  2. Spectral Doppler shows a pseudonormal pattern of left ventricular diastolic filling.  3. There is mild eccentric left ventricular hypertrophy.  4. Mildly elevated RVSP.  5. The maximum TR velocity  (and hence, PA systolic pressure) is likely underestimated.    Nuclear stress test: 7/2023  IMPRESSION:  Questionable mild fixed perfusion defect involving the mid to basal  lateral wall suggestive of prior infarct. Additional mild fixed  perfusion defect involving the inferior wall may be related to bowel  artifact. Prior infarct cannot be excluded.     The left ventricle is moderately dilated with EDV of 165 mL,  previously 226 mL.     Normal LV wall motion with an LV EF estimated a post-stress 52%       ASSESSMENT AND PLAN:    Pt remains a suitable candidate for kidney transplantation pending cardiac stress MRI as recommended by cardiology.    Listed as status 7 since 9/2022. Started HD 1/2022.     BMI remains elevated ~47 despite losing approx losing 35 lbs since starting Ozempic. Pt aware of the need to continue efforts for wt loss to get BMI <45 per our center's policy. He has been working on changing his diet and been more active recently.  Rest of the eval per protocol.     The case will be presented at the selection committee at the [facility].  The final decision from the committee will be sent out to notify the patient/primary care physician/ nephrologist. The above recommendations were discussed with the patient at length.     In addition, the following were also discussed:    - Risks and benefits of transplantation, both short-term and long-term    - Risk of primary graft non-function, DGF, SGF, rejection, primary disease recurrence, return to dialysis    - Risks of immunosuppression including infections, CA, CV risk    - Need for compliance with medications and medical care in general    - We reviewed the necessity of HBV vaccination and other recommended vaccines before a kidney transplant, following the Centers for Disease Control and Prevention(CDC)'s guidelines  [https://www.cdc.gov/vaccines/schedules/downloads/adult/adult-combined-schedule.pdf]     Currently, the patient has received the following vaccines:    Immunization History   Administered Date(s) Administered    Flu vaccine (IIV4), preservative free *Check age/dose* 09/30/2020, 09/29/2021    Influenza, injectable, quadrivalent 09/14/2022    Pfizer Purple Cap SARS-CoV-2 02/05/2021, 02/26/2021, 12/20/2021, 08/29/2022    Pneumococcal polysaccharide vaccine, 23-valent, age 2 years and older (PNEUMOVAX 23) 11/03/2021         The patient expressed understanding of the above and wishes to proceed.  I answered all of his questions. I urged the patient to look for living donors.    - I have spent over 60 minutes with the patient, reviewing medical record, lab result , CXR result and other specialty's notes. More than 50% of the time was spent in counseling, explaining about the transplantation and answering the questions. I also reviewed the medical record, blood test results, imaging and previous studies which were obtained from the nephrologists.

## 2024-03-05 ENCOUNTER — LAB (OUTPATIENT)
Dept: LAB | Facility: LAB | Age: 39
End: 2024-03-05
Payer: COMMERCIAL

## 2024-03-05 DIAGNOSIS — Z01.810 PREOP CARDIOVASCULAR EXAM: ICD-10-CM

## 2024-03-05 DIAGNOSIS — Z01.818 PRE-TRANSPLANT EVALUATION FOR KIDNEY TRANSPLANT: ICD-10-CM

## 2024-03-27 ENCOUNTER — DOCUMENTATION (OUTPATIENT)
Dept: TRANSPLANT | Facility: HOSPITAL | Age: 39
End: 2024-03-27
Payer: COMMERCIAL

## 2024-04-02 ENCOUNTER — HOSPITAL ENCOUNTER (OUTPATIENT)
Dept: RADIOLOGY | Facility: HOSPITAL | Age: 39
Discharge: HOME | End: 2024-04-02
Payer: COMMERCIAL

## 2024-04-02 ENCOUNTER — APPOINTMENT (OUTPATIENT)
Dept: LAB | Facility: LAB | Age: 39
End: 2024-04-02
Payer: COMMERCIAL

## 2024-04-02 VITALS — WEIGHT: 315 LBS | HEIGHT: 72 IN | BODY MASS INDEX: 42.66 KG/M2

## 2024-04-02 DIAGNOSIS — Z01.810 PREOP CARDIOVASCULAR EXAM: ICD-10-CM

## 2024-04-02 DIAGNOSIS — Z01.818 PRE-TRANSPLANT EVALUATION FOR KIDNEY TRANSPLANT: ICD-10-CM

## 2024-04-02 LAB
ALBUMIN SERPL BCP-MCNC: 3.9 G/DL (ref 3.4–5)
ALP SERPL-CCNC: 93 U/L (ref 33–120)
ALT SERPL W P-5'-P-CCNC: 14 U/L (ref 10–52)
AMYLASE SERPL-CCNC: 42 U/L (ref 29–103)
AST SERPL W P-5'-P-CCNC: 16 U/L (ref 9–39)
BILIRUB DIRECT SERPL-MCNC: 0.1 MG/DL (ref 0–0.3)
BILIRUB SERPL-MCNC: 0.6 MG/DL (ref 0–1.2)
BUN SERPL-MCNC: 38 MG/DL (ref 6–23)
C PEPTIDE SERPL-MCNC: 12.5 NG/ML (ref 0.7–3.9)
CMV IGG AVIDITY SERPL IA-RTO: REACTIVE %
CREAT SERPL-MCNC: 14.87 MG/DL (ref 0.5–1.3)
EBV EA IGG SER QL: NEGATIVE
EBV NA AB SER QL: POSITIVE
EBV VCA IGG SER IA-ACNC: POSITIVE
EBV VCA IGM SER IA-ACNC: NEGATIVE
EGFRCR SERPLBLD CKD-EPI 2021: 4 ML/MIN/1.73M*2
ERYTHROCYTE [DISTWIDTH] IN BLOOD BY AUTOMATED COUNT: 17.3 % (ref 11.5–14.5)
EST. AVERAGE GLUCOSE BLD GHB EST-MCNC: 160 MG/DL
HBA1C MFR BLD: 7.2 %
HBV CORE AB SER QL: NONREACTIVE
HBV SURFACE AB SER-ACNC: 387.4 MIU/ML
HBV SURFACE AG SERPL QL IA: NONREACTIVE
HCT VFR BLD AUTO: 39.5 % (ref 41–52)
HCV AB SER QL: NONREACTIVE
HGB BLD-MCNC: 12.1 G/DL (ref 13.5–17.5)
HIV 1+2 AB+HIV1 P24 AG SERPL QL IA: NONREACTIVE
INR PPP: 1 (ref 0.9–1.1)
MCH RBC QN AUTO: 24.2 PG (ref 26–34)
MCHC RBC AUTO-ENTMCNC: 30.6 G/DL (ref 32–36)
MCV RBC AUTO: 79 FL (ref 80–100)
NRBC BLD-RTO: 0 /100 WBCS (ref 0–0)
PHOSPHATE SERPL-MCNC: 6.3 MG/DL (ref 2.5–4.9)
PLATELET # BLD AUTO: 229 X10*3/UL (ref 150–450)
PROT SERPL-MCNC: 6.7 G/DL (ref 6.4–8.2)
PROTHROMBIN TIME: 11.1 SECONDS (ref 9.8–12.8)
RBC # BLD AUTO: 4.99 X10*6/UL (ref 4.5–5.9)
TREPONEMA PALLIDUM IGG+IGM AB [PRESENCE] IN SERUM OR PLASMA BY IMMUNOASSAY: NONREACTIVE
VARICELLA ZOSTER IGG INDEX: 4.1 IA
VZV IGG SER QL IA: POSITIVE
WBC # BLD AUTO: 7.4 X10*3/UL (ref 4.4–11.3)

## 2024-04-02 PROCEDURE — A9575 INJ GADOTERATE MEGLUMI 0.1ML: HCPCS | Performed by: INTERNAL MEDICINE

## 2024-04-02 PROCEDURE — 87340 HEPATITIS B SURFACE AG IA: CPT

## 2024-04-02 PROCEDURE — 84681 ASSAY OF C-PEPTIDE: CPT

## 2024-04-02 PROCEDURE — 80323 ALKALOIDS NOS: CPT

## 2024-04-02 PROCEDURE — 86803 HEPATITIS C AB TEST: CPT

## 2024-04-02 PROCEDURE — 87389 HIV-1 AG W/HIV-1&-2 AB AG IA: CPT

## 2024-04-02 PROCEDURE — 86780 TREPONEMA PALLIDUM: CPT

## 2024-04-02 PROCEDURE — 85027 COMPLETE CBC AUTOMATED: CPT

## 2024-04-02 PROCEDURE — 2550000001 HC RX 255 CONTRASTS: Performed by: INTERNAL MEDICINE

## 2024-04-02 PROCEDURE — 86787 VARICELLA-ZOSTER ANTIBODY: CPT

## 2024-04-02 PROCEDURE — 86704 HEP B CORE ANTIBODY TOTAL: CPT

## 2024-04-02 PROCEDURE — 82565 ASSAY OF CREATININE: CPT

## 2024-04-02 PROCEDURE — 86706 HEP B SURFACE ANTIBODY: CPT

## 2024-04-02 PROCEDURE — 82150 ASSAY OF AMYLASE: CPT

## 2024-04-02 PROCEDURE — 36415 COLL VENOUS BLD VENIPUNCTURE: CPT

## 2024-04-02 PROCEDURE — 84520 ASSAY OF UREA NITROGEN: CPT

## 2024-04-02 PROCEDURE — 84100 ASSAY OF PHOSPHORUS: CPT

## 2024-04-02 PROCEDURE — 80349 CANNABINOIDS NATURAL: CPT

## 2024-04-02 PROCEDURE — 86481 TB AG RESPONSE T-CELL SUSP: CPT

## 2024-04-02 PROCEDURE — 80076 HEPATIC FUNCTION PANEL: CPT

## 2024-04-02 PROCEDURE — 86663 EPSTEIN-BARR ANTIBODY: CPT

## 2024-04-02 PROCEDURE — 85610 PROTHROMBIN TIME: CPT

## 2024-04-02 PROCEDURE — 80307 DRUG TEST PRSMV CHEM ANLYZR: CPT

## 2024-04-02 PROCEDURE — 86664 EPSTEIN-BARR NUCLEAR ANTIGEN: CPT

## 2024-04-02 PROCEDURE — 75563 CARD MRI W/STRESS IMG & DYE: CPT

## 2024-04-02 PROCEDURE — 86644 CMV ANTIBODY: CPT

## 2024-04-02 PROCEDURE — 86665 EPSTEIN-BARR CAPSID VCA: CPT

## 2024-04-02 PROCEDURE — 83036 HEMOGLOBIN GLYCOSYLATED A1C: CPT

## 2024-04-02 RX ORDER — GADOTERATE MEGLUMINE 376.9 MG/ML
40 INJECTION INTRAVENOUS
Status: COMPLETED | OUTPATIENT
Start: 2024-04-02 | End: 2024-04-02

## 2024-04-02 RX ADMIN — GADOTERATE MEGLUMINE 40 ML: 376.9 INJECTION INTRAVENOUS at 14:05

## 2024-04-02 NOTE — NURSING NOTE
MRI STRESS        Stress protocol: Regadenoson  Regadenoson Dose: 0.4mg  Aminophylline Dose: 100mg     Resting Vitals:  BP: 153/92  HR: 72 bpm   SPO2: 98% RA  Resting ECG: NSR with nonspecific T wave abnormality     Stress:  0.4mg IV push Regadenoson:  1 min: /62  HR 88 bpm 93% RA Symptoms: short of breath  2 min: /78  HR 89 bpm 100% RA Symptoms: short of breath     Reversal/Recovery:  100mg IV push Aminophylline:  1 min: /62  HR 78 bpm 99% RA Symptoms: Denies  2 min: /70  HR 79 bpm 100% RA Symptoms: Denies  4 min: /64  HR 79 bpm 100% RA Symptoms: Denies  6 min: /64  HR 76 bpm 99% RA Symptoms: Denies     Patients resting HR of 72 bpm jose to a maximum of 89 bpm.  Resting BP of 153/92 jose to a maximum of 187/62. Patients post stress EKG remained unchanged.  Patient discharged from the [facility] to home.

## 2024-04-04 LAB
AMPHETAMINES SERPL QL SCN: NEGATIVE NG/ML
ANNOTATION COMMENT IMP: NORMAL
BARBITURATES SERPL QL SCN: NEGATIVE NG/ML
BENZODIAZ SERPL QL SCN: NEGATIVE NG/ML
BUPRENORPHINE SERPL-MCNC: NEGATIVE NG/ML
CANNABINOIDS SERPL QL SCN: POSITIVE NG/ML
COCAINE SERPL QL SCN: NEGATIVE NG/ML
METHADONE SERPL QL SCN: NEGATIVE NG/ML
METHAMPHET SERPL QL: NEGATIVE NG/ML
NIL(NEG) CONTROL SPOT COUNT: NORMAL
OPIATES SERPL QL SCN: NEGATIVE NG/ML
OXYCODONE SERPL QL: NEGATIVE NG/ML
PANEL A SPOT COUNT: 0
PANEL B SPOT COUNT: 0
PCP SERPL QL SCN: NEGATIVE NG/ML
POS CONTROL SPOT COUNT: NORMAL
T-SPOT. TB INTERPRETATION: NEGATIVE

## 2024-04-06 LAB
COTININE SERPL-MCNC: 70 NG/ML
NICOTINE SERPL-MCNC: <5 NG/ML

## 2024-04-07 LAB — CANNABINOIDS SERPL-MCNC: 8 NG/ML

## 2024-05-03 ENCOUNTER — DOCUMENTATION (OUTPATIENT)
Dept: TRANSPLANT | Facility: HOSPITAL | Age: 39
End: 2024-05-03
Payer: COMMERCIAL

## 2024-05-03 NOTE — PROGRESS NOTES
Per chart review, patient needs his cardiac MRI reviewed by cardiology for final clearance.    Reached out to Dr. Villegas for his input.

## 2024-06-11 ENCOUNTER — DOCUMENTATION (OUTPATIENT)
Dept: TRANSPLANT | Facility: HOSPITAL | Age: 39
End: 2024-06-11
Payer: COMMERCIAL

## 2024-06-11 NOTE — PROGRESS NOTES
Reached out to Dr. Villegas for final cardiac risk assessment s/p the patient's completion of cMRI.

## 2024-06-19 ENCOUNTER — DOCUMENTATION (OUTPATIENT)
Dept: TRANSPLANT | Facility: HOSPITAL | Age: 39
End: 2024-06-19
Payer: COMMERCIAL

## 2024-06-19 NOTE — PROGRESS NOTES
Discussed patient with Dr. Hernandez re:  final cardiac risk assessment status post his cardiac MRI.    Will await Dr. Villegas input.

## 2024-07-01 ENCOUNTER — DOCUMENTATION (OUTPATIENT)
Dept: TRANSPLANT | Facility: HOSPITAL | Age: 39
End: 2024-07-01
Payer: COMMERCIAL

## 2024-07-01 NOTE — PROGRESS NOTES
Spoke to pt.    Let him know I was having his cardiac MRI reviewed and that we would bring him in for a surgeon visit to check in on his weight loss progress.    Tasked out to SHANKAR Jha.

## 2024-07-01 NOTE — PROGRESS NOTES
"Reviewed patient's chart.    Per Dr. Parkinson's note:  \"If cardiac MRI imaging is normal, Mr. Mcneill is a low risk patient for renal transplant surgery from a heart standpoint. Normal heart exam today. Able to perform physical activity > 4 METs.\"    LM for pt returning his call.  Pt's BMI appears to be over 47.  Will have him come back in for a surgeon visit.  "

## 2024-07-03 ENCOUNTER — TELEPHONE (OUTPATIENT)
Dept: TRANSPLANT | Facility: HOSPITAL | Age: 39
End: 2024-07-03
Payer: COMMERCIAL

## 2024-07-10 ENCOUNTER — DOCUMENTATION (OUTPATIENT)
Dept: TRANSPLANT | Facility: HOSPITAL | Age: 39
End: 2024-07-10
Payer: COMMERCIAL

## 2024-07-10 DIAGNOSIS — Z01.818 PRE-TRANSPLANT EVALUATION FOR KIDNEY TRANSPLANT: ICD-10-CM

## 2024-07-30 ENCOUNTER — OFFICE VISIT (OUTPATIENT)
Dept: TRANSPLANT | Facility: HOSPITAL | Age: 39
End: 2024-07-30
Payer: COMMERCIAL

## 2024-07-30 VITALS
OXYGEN SATURATION: 95 % | TEMPERATURE: 97.9 F | WEIGHT: 315 LBS | HEART RATE: 78 BPM | SYSTOLIC BLOOD PRESSURE: 167 MMHG | BODY MASS INDEX: 45.74 KG/M2 | DIASTOLIC BLOOD PRESSURE: 79 MMHG

## 2024-07-30 DIAGNOSIS — N18.6 ESRD (END STAGE RENAL DISEASE) (MULTI): Primary | ICD-10-CM

## 2024-07-30 DIAGNOSIS — Z01.818 PRE-TRANSPLANT EVALUATION FOR KIDNEY TRANSPLANT: ICD-10-CM

## 2024-07-30 PROCEDURE — 3078F DIAST BP <80 MM HG: CPT | Performed by: TRANSPLANT SURGERY

## 2024-07-30 PROCEDURE — 99214 OFFICE O/P EST MOD 30 MIN: CPT | Performed by: TRANSPLANT SURGERY

## 2024-07-30 PROCEDURE — 4010F ACE/ARB THERAPY RXD/TAKEN: CPT | Performed by: TRANSPLANT SURGERY

## 2024-07-30 PROCEDURE — 3051F HG A1C>EQUAL 7.0%<8.0%: CPT | Performed by: TRANSPLANT SURGERY

## 2024-07-30 PROCEDURE — 3077F SYST BP >= 140 MM HG: CPT | Performed by: TRANSPLANT SURGERY

## 2024-07-30 ASSESSMENT — ENCOUNTER SYMPTOMS
WEAKNESS: 0
CONFUSION: 0
DIZZINESS: 0
DIARRHEA: 0
HEMATURIA: 0
CONSTIPATION: 0
ADENOPATHY: 0
ARTHRALGIAS: 0
CHILLS: 0
COUGH: 0
EYES NEGATIVE: 1
LIGHT-HEADEDNESS: 0
COLOR CHANGE: 0
DYSURIA: 0
ABDOMINAL PAIN: 0
FREQUENCY: 0
AGITATION: 0
ABDOMINAL DISTENTION: 0
HALLUCINATIONS: 0
SHORTNESS OF BREATH: 0
FEVER: 0

## 2024-07-30 ASSESSMENT — ANXIETY QUESTIONNAIRES
5. BEING SO RESTLESS THAT IT IS HARD TO SIT STILL: NOT AT ALL
IF YOU CHECKED OFF ANY PROBLEMS ON THIS QUESTIONNAIRE, HOW DIFFICULT HAVE THESE PROBLEMS MADE IT FOR YOU TO DO YOUR WORK, TAKE CARE OF THINGS AT HOME, OR GET ALONG WITH OTHER PEOPLE: NOT DIFFICULT AT ALL
6. BECOMING EASILY ANNOYED OR IRRITABLE: NOT AT ALL
GAD7 TOTAL SCORE: 0
4. TROUBLE RELAXING: NOT AT ALL
1. FEELING NERVOUS, ANXIOUS, OR ON EDGE: NOT AT ALL
2. NOT BEING ABLE TO STOP OR CONTROL WORRYING: NOT AT ALL
3. WORRYING TOO MUCH ABOUT DIFFERENT THINGS: NOT AT ALL
7. FEELING AFRAID AS IF SOMETHING AWFUL MIGHT HAPPEN: NOT AT ALL

## 2024-07-30 ASSESSMENT — PAIN SCALES - GENERAL: PAINLEVEL: 0-NO PAIN

## 2024-07-30 NOTE — PROGRESS NOTES
Patient attended appointment on 07/30/2024 with Dr. Velasco. Medications and allergies reviewed with the patient. Patient ambulated. Patient is tolerating dialysis well.   Recent hospitalizations:  No.  Blood transfusions:  No.  Falls:  No.    Comments:  Gave pt info about living donation.  Has a CT heartflow coming up.  No other updates needed at this time.  Pt lost about 7 lbs, is on Ozempic.    Demographic updates:  None.

## 2024-07-30 NOTE — PROGRESS NOTES
Subjective   Tanmay Mcneill is a 39 y.o. male who presents for continued evaluation for kidney transplant.     He denies any chest pain or shortness of breath.  He is on Ozempic and weight has been stable, BMI 44-45          Review of Systems   Constitutional:  Negative for chills and fever.   HENT: Negative.  Negative for congestion.    Eyes: Negative.    Respiratory:  Negative for cough and shortness of breath.    Cardiovascular:  Negative for chest pain.   Gastrointestinal:  Negative for abdominal distention, abdominal pain, constipation and diarrhea.   Endocrine: Negative for cold intolerance and heat intolerance.   Genitourinary:  Negative for dysuria, frequency, hematuria and urgency.   Musculoskeletal:  Negative for arthralgias.   Skin:  Negative for color change.   Allergic/Immunologic: Negative for environmental allergies.   Neurological:  Negative for dizziness, weakness and light-headedness.   Hematological:  Negative for adenopathy.   Psychiatric/Behavioral:  Negative for agitation, confusion and hallucinations.         Objective   Vitals:    07/30/24 0918   BP: 167/79   Pulse: 78   Temp: 36.6 °C (97.9 °F)   SpO2: 95%       Physical Exam  Constitutional:       Appearance: Normal appearance.   HENT:      Head: Normocephalic and atraumatic.      Nose: Nose normal.   Eyes:      Pupils: Pupils are equal, round, and reactive to light.   Cardiovascular:      Rate and Rhythm: Normal rate.   Pulmonary:      Effort: Pulmonary effort is normal. No respiratory distress.   Abdominal:      General: There is no distension.      Palpations: Abdomen is soft. There is no mass.   Musculoskeletal:         General: No swelling. Normal range of motion.      Cervical back: Normal range of motion.   Skin:     General: Skin is warm and dry.   Neurological:      General: No focal deficit present.      Mental Status: He is alert and oriented to person, place, and time.   Psychiatric:         Mood and Affect: Mood normal.          "Behavior: Behavior normal.          Lab Review    Blood Type: No results found for: \"ABORH\"  Lab Results   Component Value Date    CREATININE 14.87 (H) 04/02/2024    K 5.0 06/23/2020    GLUCOSE 116 (H) 06/23/2020    HCT 39.5 (L) 04/02/2024    WBC 7.4 04/02/2024     04/02/2024    CALCIUM 7.9 (L) 06/23/2020     Lab Results   Component Value Date    WBC 7.4 04/02/2024    HGB 12.1 (L) 04/02/2024    HCT 39.5 (L) 04/02/2024    MCV 79 (L) 04/02/2024     04/02/2024     Lab Results   Component Value Date    GLUCOSE 116 (H) 06/23/2020    CALCIUM 7.9 (L) 06/23/2020     06/23/2020    K 5.0 06/23/2020    CO2 22 06/23/2020     (H) 06/23/2020    BUN 38 (H) 04/02/2024    CREATININE 14.87 (H) 04/02/2024 2021  Coronary artery calcium 0      Echocardiogram 8/1/2023  CONCLUSIONS:  1. Left ventricular systolic function is normal with a 60-65% estimated ejection fraction.  2. Spectral Doppler shows a pseudonormal pattern of left ventricular diastolic filling.  3. There is mild eccentric left ventricular hypertrophy.  4. Mildly elevated RVSP.  5. The maximum TR velocity (and hence, PA systolic pressure) is likely underestimated.     Nuclear stress Test 7/20/2023, Regadenoson  IMPRESSION:  Questionable mild fixed perfusion defect involving the mid to basal  lateral wall suggestive of prior infarct. Additional mild fixed  perfusion defect involving the inferior wall may be related to bowel  artifact. Prior infarct cannot be excluded.     The left ventricle is moderately dilated with EDV of 165 mL,  previously 226 mL.     Normal LV wall motion with an LV EF estimated a post-stress 52%    MRI stress cardiac 4/2024  IMPRESSION:  1. The left ventricle is normal in size, shape, and has normal to  hyperdynamic global systolic function. LVEF = 77%. There are no  segmental wall motion abnormalities.  Quantitative values are as  noted above.  2. Questionable subendocardial perfusion defect on stress images  within the " basal inferolateral wall may indicate underlying ischemia.  3. There are no findings to suggest prior ischemic damage or an  infiltrative process.  4. Concentric left ventricular hypertrophy. LV mass index = 130  gm/m2. Wall thickness measures up to 1.4 cm.  5. Mildly dilated main pulmonary artery which can be seen with  pulmonary hypertension.  6. Biatrial enlargement.    CTA scheduled for 8/7      Assessment/Plan    Diagnoses:   1. ESRD (end stage renal disease) (Multi)    2. Pre-transplant evaluation for kidney transplant      Tanmay Mcneill is a reasonable candidate     Tanmay Mcneill is a reasonable candidate for kidney transplant.    His weight is stable, BMI 44-45. Weight distribution feasible for surgery  CTA pending, but CAC 0, should be a candidate after committee discussion

## 2024-08-07 ENCOUNTER — HOSPITAL ENCOUNTER (OUTPATIENT)
Dept: RADIOLOGY | Facility: HOSPITAL | Age: 39
Discharge: HOME | End: 2024-08-07
Payer: COMMERCIAL

## 2024-08-07 VITALS
BODY MASS INDEX: 42.66 KG/M2 | WEIGHT: 315 LBS | SYSTOLIC BLOOD PRESSURE: 191 MMHG | OXYGEN SATURATION: 100 % | HEIGHT: 72 IN | RESPIRATION RATE: 16 BRPM | DIASTOLIC BLOOD PRESSURE: 87 MMHG | HEART RATE: 78 BPM

## 2024-08-07 DIAGNOSIS — Z01.818 PRE-TRANSPLANT EVALUATION FOR KIDNEY TRANSPLANT: ICD-10-CM

## 2024-08-07 LAB
CREAT SERPL-MCNC: 5.2 MG/DL (ref 0.6–1.3)
GFR SERPL CREATININE-BSD FRML MDRD: 13 ML/MIN/1.73M*2

## 2024-08-07 PROCEDURE — 75574 CT ANGIO HRT W/3D IMAGE: CPT | Performed by: RADIOLOGY

## 2024-08-07 PROCEDURE — 82565 ASSAY OF CREATININE: CPT

## 2024-08-07 PROCEDURE — 2550000001 HC RX 255 CONTRASTS: Performed by: STUDENT IN AN ORGANIZED HEALTH CARE EDUCATION/TRAINING PROGRAM

## 2024-08-07 PROCEDURE — 75574 CT ANGIO HRT W/3D IMAGE: CPT

## 2024-08-07 PROCEDURE — 2500000001 HC RX 250 WO HCPCS SELF ADMINISTERED DRUGS (ALT 637 FOR MEDICARE OP): Performed by: STUDENT IN AN ORGANIZED HEALTH CARE EDUCATION/TRAINING PROGRAM

## 2024-08-07 PROCEDURE — 2500000005 HC RX 250 GENERAL PHARMACY W/O HCPCS: Performed by: STUDENT IN AN ORGANIZED HEALTH CARE EDUCATION/TRAINING PROGRAM

## 2024-08-07 RX ORDER — METOPROLOL TARTRATE 1 MG/ML
5 INJECTION, SOLUTION INTRAVENOUS ONCE AS NEEDED
Status: COMPLETED | OUTPATIENT
Start: 2024-08-07 | End: 2024-08-07

## 2024-08-07 RX ORDER — LORAZEPAM 2 MG/ML
0.5 INJECTION INTRAMUSCULAR EVERY 5 MIN PRN
Status: DISCONTINUED | OUTPATIENT
Start: 2024-08-07 | End: 2024-08-08 | Stop reason: HOSPADM

## 2024-08-07 RX ORDER — METOPROLOL TARTRATE 50 MG/1
100 TABLET ORAL ONCE
Status: DISCONTINUED | OUTPATIENT
Start: 2024-08-07 | End: 2024-08-08 | Stop reason: HOSPADM

## 2024-08-07 RX ORDER — METOPROLOL TARTRATE 1 MG/ML
5 INJECTION, SOLUTION INTRAVENOUS ONCE
Status: COMPLETED | OUTPATIENT
Start: 2024-08-07 | End: 2024-08-07

## 2024-08-07 RX ORDER — METOPROLOL TARTRATE 50 MG/1
100 TABLET ORAL ONCE AS NEEDED
Status: DISCONTINUED | OUTPATIENT
Start: 2024-08-07 | End: 2024-08-08 | Stop reason: HOSPADM

## 2024-08-07 RX ORDER — NITROGLYCERIN 0.4 MG/1
0.8 TABLET SUBLINGUAL ONCE
Status: COMPLETED | OUTPATIENT
Start: 2024-08-07 | End: 2024-08-07

## 2024-08-07 ASSESSMENT — PAIN - FUNCTIONAL ASSESSMENT: PAIN_FUNCTIONAL_ASSESSMENT: 0-10

## 2024-08-07 ASSESSMENT — PAIN SCALES - GENERAL: PAINLEVEL_OUTOF10: 0 - NO PAIN

## 2024-09-19 ENCOUNTER — DOCUMENTATION (OUTPATIENT)
Dept: TRANSPLANT | Facility: HOSPITAL | Age: 39
End: 2024-09-19
Payer: COMMERCIAL

## 2024-09-19 NOTE — PROGRESS NOTES
Per EV BCBS & Medicare part A only active.  Per Radha BARAKAT [phone] BC/BS of [place] a/e 01/01/21 Kidney Txp does not require prior auth but precert is needed at [phone] -....

## 2024-09-19 NOTE — PROGRESS NOTES
"Transplant Candidate Pharmacist Screening Note     Current Outpatient Medications on File Prior to Visit   Medication Sig Dispense Refill    albuterol (Ventolin HFA) 90 mcg/actuation inhaler Inhale 2 puffs every 4 hours if needed.      albuterol 0.63 mg/3 mL nebulizer solution Take 3 mL (0.63 mg) by nebulization every 6 hours if needed for wheezing or shortness of breath.      amLODIPine (Norvasc) 10 mg tablet Take 1 tablet (10 mg) by mouth once daily.      atorvastatin (Lipitor) 80 mg tablet Take 1 tablet (80 mg) by mouth once daily.      blood sugar diagnostic (Accu-Chek Celi Plus test strp) strip 1 strip 4 times a day.      carvedilol (Coreg) 25 mg tablet Take 1 tablet (25 mg) by mouth 2 times daily (morning and late afternoon).      ergocalciferol (Vitamin D-2) 1.25 MG (64636 UT) capsule TAKE 1 CAPSULE BY MOUTH ONCE A WEEK FOR 16 DOSES      FUROSEMIDE ORAL Take by mouth twice a day.      hydrALAZINE (Apresoline) 100 mg tablet Take 1 tablet (100 mg) by mouth every 12 hours.      lancets (OneTouch UltraSoft Lancets) misc 1 Lancet 2 times a day.      losartan (Cozaar) 100 mg tablet Take 0.5 tablets (50 mg) by mouth once daily.      oxyCODONE (Oxy-IR) 5 mg immediate release capsule Take 1 capsule (5 mg) by mouth every 6 hours if needed.      Ozempic 0.25 mg or 0.5 mg (2 mg/3 mL) pen injector Inject 0.25 mg under the skin 1 (one) time per week.      pantoprazole (ProtoNix) 20 mg EC tablet Take 1 tablet (20 mg) by mouth twice a day.      pen needle, diabetic 31 gauge x 1/4\" needle       Semglee,insulin glarg-yfgn,Pen 100 unit/mL (3 mL) Pen       sodium bicarbonate 650 mg tablet Take 2 tablets (1,300 mg) by mouth 2 times a day.      sucroferric oxyhydroxide (Velphoro) 500 mg tablet,chewable chewable tablet Chew 1 tablet (500 mg) 3 times a day.      zolpidem (Ambien) 5 mg tablet Take 0.5 tablets (2.5 mg) by mouth as needed at bedtime.       No current facility-administered medications on file prior to visit.       The " patient's reported medications have been reviewed. Based on the above medication list, there are no pharmacologic contraindications to kidney transplantation.    Evin Newton, Mickie, BCPS, BCTXP   Solid Organ Transplant Clinical Pharmacy Specialist

## 2024-09-20 ENCOUNTER — COMMITTEE REVIEW (OUTPATIENT)
Dept: TRANSPLANT | Facility: HOSPITAL | Age: 39
End: 2024-09-20
Payer: COMMERCIAL

## 2024-09-20 ENCOUNTER — TELEPHONE (OUTPATIENT)
Dept: TRANSPLANT | Facility: HOSPITAL | Age: 39
End: 2024-09-20
Payer: COMMERCIAL

## 2024-09-20 DIAGNOSIS — Z01.818 PRE-TRANSPLANT EVALUATION FOR KIDNEY TRANSPLANT: ICD-10-CM

## 2024-09-20 NOTE — COMMITTEE REVIEW
Presentation for Listing Evaluation Date: 10/29/2021  Committee Review Date: 9/19/2024    Organ being evaluated for: Kidney    Transplant Phase: Waitlist  Transplant Status: Inactive    Referring Physician:      Primary Diagnosis: Diabetes Mellitus - Type II  Secondary Diagnosis:     Committee Review Decision: Make Active      Committee Discussion Details:   The candidate's evaluation was presented and discussed at the Kidney  Multidisciplinary Selection Conference. After review of the candidate's diagnosis and the evaluations of the multidisciplinary team members, it was the consensus of the Selection Committee that the candidate does meet Kidney Selection Criteria and is made active for Kidney transplant.    Patient needs a new autocrossmatch.  Repeat CTA in January 2025.

## 2024-09-20 NOTE — TELEPHONE ENCOUNTER
Talked to pt, let him know we can activate him on the transplant list - he just needs to get a new autocrossmatch.    He will go to the lab Monday morning and let me know once that is done.

## 2024-09-20 NOTE — LETTER
September 20, 2024    Tanmay Mcneill  [address]  [facility] [address]      Dear Mr. Mcneill:    Our multi-disciplinary transplant team completed a review of your medical records on 9/19/2024.  I am pleased to inform you that you will be re-activated on the [employer] [facility] [employer] Organ [facility] (UNOS) waiting list for a Kidney transplant.    Our transplant program consists of surgeons and medical doctors who provide coverage 365 days a year, 24 hours a day.     If you have any questions or concerns regarding your insurance coverage or billing issues, a  is available to speak with you.     It is important to keep us updated of any major changes in your medical condition, contact information and health insurance coverage.     Please don't hesitate to contact us at Dept: [phone] with any questions or concerns. We look forward to working with you through this process.      Sincerely,      Selam Black RN          The UNOS Toll-free Patient Services Line:  Your Resource for Organ Transplant Information    If you have a question regarding your own medical care, you always should call your transplant hospital first. However, for general organ transplant-related information, you should call the [employer] [facility] [employer] Organ [facility] (UNOS) toll-free patient services line at [phone].  Anyone, including potential transplant candidates, candidates, recipients, family members, friends, living donors, and donor family members, can call this number to:    Talk about organ donation, living donation, the transplant process, the donation process, and transplant policies.  Get a free patient information kit with helpful booklets, waiting list and transplant information, and a list of all transplant hospitals.  Ask questions about the Organ Procurement and Transplantation Network (OPTN) web site (http://optn.transplant.hrsa.gov/), the UNOS Web site (http://unos.org/), or the UNOS web site for  living donors and transplant recipients. (http://www.transplantliving.org/).  Learn how [facility] and the OPTN can help you.  Talk about any concerns that you may have with a transplant hospital.    [employer] is a not-for-profit organization that provides the administrative services for the national OPTN under federal contract to the Health Resources and Services Administration (HRSA), an agency under the U.S. Department of Health and Human Services (HHS).    [facility] and the OPTN are responsible for:    Providing educational material for patients, the public, and professionals.  Raising awareness of the need for donated organs and tissue.  Writing organ transplant policy with help from transplant professionals, transplant patients, transplant candidates, donor families, living donors, and the public.  Coordinating organ procurement, matching, and placement.  Collecting information about every organ transplant and donation that occurs in the [place].    Remember, you should contact your transplant hospital directly if you have questions or concerns about your own medical care including medical records, work-up progress, and test results.    [facility] is not your transplant hospital, and staff at [facility] will not be able to transfer you to your transplant hospital, so keep your transplant hospital’s phone number handy.    However, while you research your transplant needs and learn as much as you can about transplantation and donation, we welcome your call to our toll-free patient services line at [phone].      [facility] PIL Final Rev 1-

## 2024-09-23 ENCOUNTER — COMMITTEE REVIEW (OUTPATIENT)
Dept: TRANSPLANT | Facility: HOSPITAL | Age: 39
End: 2024-09-23
Payer: COMMERCIAL

## 2024-09-23 ENCOUNTER — LAB (OUTPATIENT)
Dept: LAB | Facility: LAB | Age: 39
End: 2024-09-23
Payer: COMMERCIAL

## 2024-09-23 DIAGNOSIS — Z01.818 PRE-TRANSPLANT EVALUATION FOR KIDNEY TRANSPLANT: ICD-10-CM

## 2024-09-23 PROCEDURE — 86825 HLA X-MATH NON-CYTOTOXIC: CPT | Mod: OUT | Performed by: SURGERY

## 2024-09-23 PROCEDURE — 86832 HLA CLASS I HIGH DEFIN QUAL: CPT | Mod: OUT | Performed by: SURGERY

## 2024-09-23 NOTE — COMMITTEE REVIEW
Presentation for Listing Evaluation Date: 10/29/2021  Committee Review Date: 9/19/2024    Organ being evaluated for: Kidney    Transplant Phase: Waitlist  Transplant Status: Inactive    Referring Physician:      Primary Diagnosis: Diabetes Mellitus - Type II  Secondary Diagnosis:     Committee Review Decision: Make Active      Committee Discussion Details:   The candidate's evaluation was presented and discussed at the Kidney  Multidisciplinary Selection Conference. After review of the candidate's diagnosis and the evaluations of the multidisciplinary team members, it was the consensus of the Selection Committee that the candidate does meet Kidney Selection Criteria and can be made active for Kidney transplant.    Resolution: Activate status 1.  Needs new autocrossmatch.  Repeat CTA in January 2025.

## 2024-09-23 NOTE — LETTER
September 23, 2024    Tanmay Mcneill  [address]  [facility] [address]      Dear Mr. Mcneill:    Our multi-disciplinary transplant team completed a review of your medical records on 9/19/2024.  I am pleased to inform you that you will be re-activated on the [facility] Organ [facility] (UNOS) waiting list for a Kidney transplant.    Our transplant program consists of surgeons and medical doctors who provide coverage 365 days a year, 24 hours a day.     If you have any questions or concerns regarding your insurance coverage or billing issues, a  is available to speak with you.     It is important to keep us updated of any major changes in your medical condition, contact information and health insurance coverage.     Please don't hesitate to contact us at Dept: [phone] with any questions or concerns. We look forward to working with you through this process.      Sincerely,      Selam Black RN          The UNOS Toll-free Patient Services Line:  Your Resource for Organ Transplant Information    If you have a question regarding your own medical care, you always should call your transplant hospital first. However, for general organ transplant-related information, you should call the [facility] Organ [facility]aring (UNOS) toll-free patient services line at [phone].  Anyone, including potential transplant candidates, candidates, recipients, family members, friends, living donors, and donor family members, can call this number to:    Talk about organ donation, living donation, the transplant process, the donation process, and transplant policies.  Get a free patient information kit with helpful booklets, waiting list and transplant information, and a list of all transplant hospitals.  Ask questions about the Organ Procurement and Transplantation Network (OPTN) web site (http://optn.transplant.hrsa.gov/), the UNOS Web site (http://unos.org/), or the UNOS web site for  living donors and transplant recipients. (http://www.transplantliving.org/).  Learn how [facility] and the OPTN can help you.  Talk about any concerns that you may have with a transplant hospital.    [employer] is a not-for-profit organization that provides the administrative services for the national OPTN under federal contract to the Health Resources and Services Administration (HRSA), an agency under the U.S. Department of Health and Human Services (HHS).    [facility] and the OPTN are responsible for:    Providing educational material for patients, the public, and professionals.  Raising awareness of the need for donated organs and tissue.  Writing organ transplant policy with help from transplant professionals, transplant patients, transplant candidates, donor families, living donors, and the public.  Coordinating organ procurement, matching, and placement.  Collecting information about every organ transplant and donation that occurs in the [place].    Remember, you should contact your transplant hospital directly if you have questions or concerns about your own medical care including medical records, work-up progress, and test results.    [facility] is not your transplant hospital, and staff at [facility] will not be able to transfer you to your transplant hospital, so keep your transplant hospital’s phone number handy.    However, while you research your transplant needs and learn as much as you can about transplantation and donation, we welcome your call to our toll-free patient services line at [phone].      [facility] PIL Final Rev 1-

## 2024-09-24 ENCOUNTER — LAB REQUISITION (OUTPATIENT)
Dept: LAB | Facility: CLINIC | Age: 39
End: 2024-09-24
Payer: COMMERCIAL

## 2024-09-24 ENCOUNTER — DOCUMENTATION (OUTPATIENT)
Dept: TRANSPLANT | Facility: HOSPITAL | Age: 39
End: 2024-09-24
Payer: COMMERCIAL

## 2024-09-24 DIAGNOSIS — N18.6 END STAGE RENAL DISEASE (MULTI): ICD-10-CM

## 2024-09-24 LAB
FLOW AUTOCROSSMATCH: NORMAL
HLA RESULTS: NORMAL
HLA-A+B+C AB NFR SER: NORMAL %
HLA-DP+DQ+DR AB NFR SER: NORMAL %

## 2024-09-25 ENCOUNTER — DOCUMENTATION (OUTPATIENT)
Dept: TRANSPLANT | Facility: HOSPITAL | Age: 39
End: 2024-09-25
Payer: COMMERCIAL

## 2024-09-25 LAB
FLOW AUTOCROSSMATCH: NORMAL
HLA RESULTS: NORMAL
HLA RESULTS: NORMAL
HLA-A+B+C AB NFR SER: NORMAL %
HLA-DP+DQ+DR AB NFR SER: NORMAL %

## 2024-10-04 PROCEDURE — 80505 PATH CLIN CONSLTJ HIGH 41-60: CPT | Performed by: PATHOLOGY

## 2024-10-17 ENCOUNTER — LAB REQUISITION (OUTPATIENT)
Dept: LAB | Facility: CLINIC | Age: 39
End: 2024-10-17
Payer: COMMERCIAL

## 2024-10-17 DIAGNOSIS — N18.6 END STAGE RENAL DISEASE (MULTI): ICD-10-CM

## 2024-10-23 PROCEDURE — 80505 PATH CLIN CONSLTJ HIGH 41-60: CPT | Performed by: PATHOLOGY

## 2024-10-31 ENCOUNTER — LAB REQUISITION (OUTPATIENT)
Dept: LAB | Facility: CLINIC | Age: 39
End: 2024-10-31
Payer: COMMERCIAL

## 2024-10-31 DIAGNOSIS — N18.6 END STAGE RENAL DISEASE (MULTI): ICD-10-CM

## 2024-12-02 ENCOUNTER — TELEPHONE (OUTPATIENT)
Dept: TRANSPLANT | Facility: HOSPITAL | Age: 39
End: 2024-12-02
Payer: COMMERCIAL

## 2024-12-02 NOTE — TELEPHONE ENCOUNTER
Attempted to reach patient about needing to update HLA sample.  Left VM requesting updated HLA sample.  Provided Pre Kidney office [phone] for questions.  Coordinator notified.

## 2024-12-06 ENCOUNTER — IMMUNIZATION (OUTPATIENT)
Dept: TRANSPLANT | Facility: HOSPITAL | Age: 39
End: 2024-12-06
Payer: COMMERCIAL

## 2024-12-06 ENCOUNTER — DOCUMENTATION (OUTPATIENT)
Dept: TRANSPLANT | Facility: HOSPITAL | Age: 39
End: 2024-12-06
Payer: COMMERCIAL

## 2024-12-11 ENCOUNTER — DOCUMENTATION (OUTPATIENT)
Dept: TRANSPLANT | Facility: HOSPITAL | Age: 39
End: 2024-12-11
Payer: COMMERCIAL

## 2024-12-11 NOTE — PROGRESS NOTES
Lab Results   Component Value Date    HEPBSAB 387.4 (H) 04/02/2024       Immunization History   Administered Date(s) Administered Comments    Hep A / Hep B 01/11/2017    Deferred Date(s) Deferred Comments    Hepatitis B vaccine, 19 yrs and under (RECOMBIVAX, ENGERIX) 12/11/2024 Patient decision   Left VM for the patient to ask about Hep B vaccination.  Records from dialysis requested.  They only sent us Hep B labs without any vaccine history

## 2024-12-19 ENCOUNTER — TELEPHONE (OUTPATIENT)
Dept: TRANSPLANT | Facility: HOSPITAL | Age: 39
End: 2024-12-19
Payer: COMMERCIAL

## 2024-12-19 NOTE — TELEPHONE ENCOUNTER
Attempted to reach patient about needing to update HLA sample.  Left VM requesting updated HLA sample.  Provided Pre Kidney office [phone] for questions.  Coordinator notified.

## 2025-01-02 ENCOUNTER — TELEPHONE (OUTPATIENT)
Facility: HOSPITAL | Age: 40
End: 2025-01-02

## 2025-01-09 NOTE — TELEPHONE ENCOUNTER
Called and spoke to patient to remind them to update monthly HLA sample at either an approved  lab or dialysis unit, if applicable.  Patient verbalized an understanding and will complete lab work tomorrow, pt needs kits as well  
yes

## 2025-01-23 ENCOUNTER — TELEPHONE (OUTPATIENT)
Facility: HOSPITAL | Age: 40
End: 2025-01-23

## 2025-01-23 NOTE — TELEPHONE ENCOUNTER
Pt said he is still waiting on kits to be sent, and he got his labs done at the dialysis unit a few days ago for hla

## 2025-02-03 PROCEDURE — 86833 HLA CLASS II HIGH DEFIN QUAL: CPT | Mod: OUT | Performed by: SURGERY

## 2025-02-03 PROCEDURE — 86832 HLA CLASS I HIGH DEFIN QUAL: CPT | Mod: OUT | Performed by: SURGERY

## 2025-02-12 ENCOUNTER — LAB REQUISITION (OUTPATIENT)
Dept: LAB | Facility: CLINIC | Age: 40
End: 2025-02-12
Payer: COMMERCIAL

## 2025-02-12 DIAGNOSIS — N18.6 END STAGE RENAL DISEASE (MULTI): ICD-10-CM

## 2025-02-12 LAB
HLA RESULTS: NORMAL
HLA-A+B+C AB NFR SER: NORMAL %
HLA-DP+DQ+DR AB NFR SER: NORMAL %

## 2025-02-19 ENCOUNTER — DOCUMENTATION (OUTPATIENT)
Facility: HOSPITAL | Age: 40
End: 2025-02-19
Payer: COMMERCIAL

## 2025-03-03 PROCEDURE — 80505 PATH CLIN CONSLTJ HIGH 41-60: CPT | Performed by: STUDENT IN AN ORGANIZED HEALTH CARE EDUCATION/TRAINING PROGRAM

## 2025-03-10 PROCEDURE — 86808 CYTOTOXIC ANTIBODY SCREENING: CPT | Mod: OUT | Performed by: TRANSPLANT SURGERY

## 2025-03-21 ENCOUNTER — LAB REQUISITION (OUTPATIENT)
Dept: LAB | Facility: CLINIC | Age: 40
End: 2025-03-21
Payer: COMMERCIAL

## 2025-03-21 DIAGNOSIS — N18.6 END STAGE RENAL DISEASE (MULTI): ICD-10-CM

## 2025-03-21 LAB
DIAGNOSIS-VIRTUAL CROSSMATCH: NORMAL
PATH REVIEW-VIRTUAL CROSSMATCH: NORMAL
PATHOLOGIST ID-VIRTUAL CROSSMATCH: NORMAL

## 2025-03-31 ENCOUNTER — LAB REQUISITION (OUTPATIENT)
Dept: LAB | Facility: CLINIC | Age: 40
End: 2025-03-31
Payer: COMMERCIAL

## 2025-03-31 DIAGNOSIS — N18.6 END STAGE RENAL DISEASE (MULTI): ICD-10-CM

## 2025-03-31 LAB — FREEZE CROSSMATCH: NORMAL

## 2025-04-18 ENCOUNTER — HOSPITAL ENCOUNTER (INPATIENT)
Facility: HOSPITAL | Age: 40
DRG: 650 | End: 2025-04-18
Attending: STUDENT IN AN ORGANIZED HEALTH CARE EDUCATION/TRAINING PROGRAM | Admitting: STUDENT IN AN ORGANIZED HEALTH CARE EDUCATION/TRAINING PROGRAM
Payer: MEDICARE

## 2025-04-18 ENCOUNTER — TELEPHONE (OUTPATIENT)
Dept: TRANSPLANT | Facility: HOSPITAL | Age: 40
End: 2025-04-18

## 2025-04-18 ENCOUNTER — APPOINTMENT (OUTPATIENT)
Dept: RADIOLOGY | Facility: HOSPITAL | Age: 40
DRG: 650 | End: 2025-04-18
Payer: MEDICARE

## 2025-04-18 ENCOUNTER — TELEPHONE (OUTPATIENT)
Facility: HOSPITAL | Age: 40
End: 2025-04-18
Payer: COMMERCIAL

## 2025-04-18 DIAGNOSIS — Z79.4 TYPE 2 DIABETES MELLITUS WITH HYPERGLYCEMIA, WITH LONG-TERM CURRENT USE OF INSULIN: Primary | Chronic | ICD-10-CM

## 2025-04-18 DIAGNOSIS — T38.0X5A STEROID-INDUCED HYPERGLYCEMIA: ICD-10-CM

## 2025-04-18 DIAGNOSIS — N25.89 RENAL TUBULAR ACIDOSIS: ICD-10-CM

## 2025-04-18 DIAGNOSIS — R73.9 STEROID-INDUCED HYPERGLYCEMIA: ICD-10-CM

## 2025-04-18 DIAGNOSIS — N18.6 ESRD (END STAGE RENAL DISEASE) (MULTI): ICD-10-CM

## 2025-04-18 DIAGNOSIS — E11.65 TYPE 2 DIABETES MELLITUS WITH HYPERGLYCEMIA, WITH LONG-TERM CURRENT USE OF INSULIN: Primary | Chronic | ICD-10-CM

## 2025-04-18 DIAGNOSIS — T86.19 DELAYED GRAFT FUNCTION OF KIDNEY (HHS-HCC): ICD-10-CM

## 2025-04-18 DIAGNOSIS — Z94.0 KIDNEY REPLACED BY TRANSPLANT (HHS-HCC): ICD-10-CM

## 2025-04-18 DIAGNOSIS — E55.9 VITAMIN D DEFICIENCY: ICD-10-CM

## 2025-04-18 DIAGNOSIS — Z94.9 TRANSPLANT: ICD-10-CM

## 2025-04-18 LAB — GLUCOSE BLD MANUAL STRIP-MCNC: 189 MG/DL (ref 74–99)

## 2025-04-18 PROCEDURE — 81379 HLA I TYPING COMPLETE HR: CPT | Mod: OUT | Performed by: TRANSPLANT SURGERY

## 2025-04-18 PROCEDURE — 71045 X-RAY EXAM CHEST 1 VIEW: CPT

## 2025-04-18 PROCEDURE — 1100000001 HC PRIVATE ROOM DAILY

## 2025-04-18 PROCEDURE — 99232 SBSQ HOSP IP/OBS MODERATE 35: CPT | Performed by: STUDENT IN AN ORGANIZED HEALTH CARE EDUCATION/TRAINING PROGRAM

## 2025-04-18 PROCEDURE — 71045 X-RAY EXAM CHEST 1 VIEW: CPT | Performed by: RADIOLOGY

## 2025-04-18 PROCEDURE — 82947 ASSAY GLUCOSE BLOOD QUANT: CPT

## 2025-04-18 RX ORDER — ACETAMINOPHEN 325 MG/1
975 TABLET ORAL ONCE
Status: DISCONTINUED | OUTPATIENT
Start: 2025-04-19 | End: 2025-04-21

## 2025-04-18 RX ORDER — CEFAZOLIN SODIUM 2 G/100ML
2 INJECTION, SOLUTION INTRAVENOUS ONCE
Status: DISCONTINUED | OUTPATIENT
Start: 2025-04-18 | End: 2025-04-23

## 2025-04-18 RX ORDER — GABAPENTIN 300 MG/1
300 CAPSULE ORAL ONCE
Status: DISCONTINUED | OUTPATIENT
Start: 2025-04-19 | End: 2025-04-21

## 2025-04-18 SDOH — SOCIAL STABILITY: SOCIAL INSECURITY: WITHIN THE LAST YEAR, HAVE YOU BEEN AFRAID OF YOUR PARTNER OR EX-PARTNER?: NO

## 2025-04-18 SDOH — SOCIAL STABILITY: SOCIAL INSECURITY: DO YOU FEEL ANYONE HAS EXPLOITED OR TAKEN ADVANTAGE OF YOU FINANCIALLY OR OF YOUR PERSONAL PROPERTY?: NO

## 2025-04-18 SDOH — SOCIAL STABILITY: SOCIAL INSECURITY
WITHIN THE LAST YEAR, HAVE YOU BEEN RAPED OR FORCED TO HAVE ANY KIND OF SEXUAL ACTIVITY BY YOUR PARTNER OR EX-PARTNER?: NO

## 2025-04-18 SDOH — SOCIAL STABILITY: SOCIAL INSECURITY: ARE THERE ANY APPARENT SIGNS OF INJURIES/BEHAVIORS THAT COULD BE RELATED TO ABUSE/NEGLECT?: NO

## 2025-04-18 SDOH — ECONOMIC STABILITY: FOOD INSECURITY: WITHIN THE PAST 12 MONTHS, YOU WORRIED THAT YOUR FOOD WOULD RUN OUT BEFORE YOU GOT THE MONEY TO BUY MORE.: NEVER TRUE

## 2025-04-18 SDOH — SOCIAL STABILITY: SOCIAL INSECURITY: DO YOU FEEL UNSAFE GOING BACK TO THE PLACE WHERE YOU ARE LIVING?: NO

## 2025-04-18 SDOH — ECONOMIC STABILITY: FOOD INSECURITY: WITHIN THE PAST 12 MONTHS, THE FOOD YOU BOUGHT JUST DIDN'T LAST AND YOU DIDN'T HAVE MONEY TO GET MORE.: NEVER TRUE

## 2025-04-18 SDOH — ECONOMIC STABILITY: INCOME INSECURITY: IN THE PAST 12 MONTHS HAS THE ELECTRIC, GAS, OIL, OR WATER COMPANY THREATENED TO SHUT OFF SERVICES IN YOUR HOME?: NO

## 2025-04-18 SDOH — SOCIAL STABILITY: SOCIAL INSECURITY: WITHIN THE LAST YEAR, HAVE YOU BEEN HUMILIATED OR EMOTIONALLY ABUSED IN OTHER WAYS BY YOUR PARTNER OR EX-PARTNER?: NO

## 2025-04-18 SDOH — SOCIAL STABILITY: SOCIAL INSECURITY
WITHIN THE LAST YEAR, HAVE YOU BEEN KICKED, HIT, SLAPPED, OR OTHERWISE PHYSICALLY HURT BY YOUR PARTNER OR EX-PARTNER?: NO

## 2025-04-18 SDOH — SOCIAL STABILITY: SOCIAL INSECURITY: HAVE YOU HAD THOUGHTS OF HARMING ANYONE ELSE?: NO

## 2025-04-18 SDOH — SOCIAL STABILITY: SOCIAL INSECURITY: HAS ANYONE EVER THREATENED TO HURT YOUR FAMILY OR YOUR PETS?: NO

## 2025-04-18 SDOH — SOCIAL STABILITY: SOCIAL INSECURITY: DOES ANYONE TRY TO KEEP YOU FROM HAVING/CONTACTING OTHER FRIENDS OR DOING THINGS OUTSIDE YOUR HOME?: NO

## 2025-04-18 SDOH — SOCIAL STABILITY: SOCIAL INSECURITY: ABUSE: ADULT

## 2025-04-18 SDOH — SOCIAL STABILITY: SOCIAL INSECURITY: ARE YOU OR HAVE YOU BEEN THREATENED OR ABUSED PHYSICALLY, EMOTIONALLY, OR SEXUALLY BY ANYONE?: NO

## 2025-04-18 SDOH — SOCIAL STABILITY: SOCIAL INSECURITY: WERE YOU ABLE TO COMPLETE ALL THE BEHAVIORAL HEALTH SCREENINGS?: YES

## 2025-04-18 ASSESSMENT — ACTIVITIES OF DAILY LIVING (ADL)
JUDGMENT_ADEQUATE_SAFELY_COMPLETE_DAILY_ACTIVITIES: YES
GROOMING: INDEPENDENT
WALKS IN HOME: INDEPENDENT
ADEQUATE_TO_COMPLETE_ADL: YES
BATHING: INDEPENDENT
HEARING - LEFT EAR: FUNCTIONAL
TOILETING: INDEPENDENT
HEARING - RIGHT EAR: FUNCTIONAL
PATIENT'S MEMORY ADEQUATE TO SAFELY COMPLETE DAILY ACTIVITIES?: YES
DRESSING YOURSELF: INDEPENDENT
FEEDING YOURSELF: INDEPENDENT
LACK_OF_TRANSPORTATION: NO

## 2025-04-18 ASSESSMENT — COGNITIVE AND FUNCTIONAL STATUS - GENERAL
PATIENT BASELINE BEDBOUND: NO
MOBILITY SCORE: 24
DAILY ACTIVITIY SCORE: 24

## 2025-04-18 ASSESSMENT — LIFESTYLE VARIABLES
AUDIT-C TOTAL SCORE: 0
HOW MANY STANDARD DRINKS CONTAINING ALCOHOL DO YOU HAVE ON A TYPICAL DAY: PATIENT DOES NOT DRINK
HOW OFTEN DO YOU HAVE A DRINK CONTAINING ALCOHOL: NEVER
AUDIT-C TOTAL SCORE: 0
HOW OFTEN DO YOU HAVE 6 OR MORE DRINKS ON ONE OCCASION: NEVER
SKIP TO QUESTIONS 9-10: 1

## 2025-04-18 ASSESSMENT — PATIENT HEALTH QUESTIONNAIRE - PHQ9
1. LITTLE INTEREST OR PLEASURE IN DOING THINGS: NOT AT ALL
SUM OF ALL RESPONSES TO PHQ9 QUESTIONS 1 & 2: 0
2. FEELING DOWN, DEPRESSED OR HOPELESS: NOT AT ALL

## 2025-04-18 ASSESSMENT — PAIN - FUNCTIONAL ASSESSMENT: PAIN_FUNCTIONAL_ASSESSMENT: 0-10

## 2025-04-18 ASSESSMENT — PAIN SCALES - GENERAL: PAINLEVEL_OUTOF10: 0 - NO PAIN

## 2025-04-18 ASSESSMENT — COLUMBIA-SUICIDE SEVERITY RATING SCALE - C-SSRS
1. IN THE PAST MONTH, HAVE YOU WISHED YOU WERE DEAD OR WISHED YOU COULD GO TO SLEEP AND NOT WAKE UP?: NO
6. HAVE YOU EVER DONE ANYTHING, STARTED TO DO ANYTHING, OR PREPARED TO DO ANYTHING TO END YOUR LIFE?: NO
2. HAVE YOU ACTUALLY HAD ANY THOUGHTS OF KILLING YOURSELF?: NO

## 2025-04-19 ENCOUNTER — SURGERY (OUTPATIENT)
Age: 40
DRG: 650 | End: 2025-04-19
Payer: MEDICARE

## 2025-04-19 ENCOUNTER — APPOINTMENT (OUTPATIENT)
Dept: RADIOLOGY | Facility: HOSPITAL | Age: 40
DRG: 650 | End: 2025-04-19
Payer: MEDICARE

## 2025-04-19 ENCOUNTER — ANESTHESIA (OUTPATIENT)
Dept: OPERATING ROOM | Facility: HOSPITAL | Age: 40
End: 2025-04-19
Payer: MEDICARE

## 2025-04-19 ENCOUNTER — ANESTHESIA EVENT (OUTPATIENT)
Dept: OPERATING ROOM | Facility: HOSPITAL | Age: 40
End: 2025-04-19
Payer: MEDICARE

## 2025-04-19 LAB
ABO GROUP (TYPE) IN BLOOD: NORMAL
ALBUMIN SERPL BCP-MCNC: 4 G/DL (ref 3.4–5)
ALBUMIN SERPL BCP-MCNC: 4.1 G/DL (ref 3.4–5)
ALBUMIN SERPL BCP-MCNC: 4.2 G/DL (ref 3.4–5)
ALP SERPL-CCNC: 88 U/L (ref 33–120)
ALT SERPL W P-5'-P-CCNC: 15 U/L (ref 10–52)
ANION GAP BLDV CALCULATED.4IONS-SCNC: 10 MMOL/L (ref 10–25)
ANION GAP BLDV CALCULATED.4IONS-SCNC: 13 MMOL/L (ref 10–25)
ANION GAP SERPL CALC-SCNC: 17 MMOL/L (ref 10–20)
ANION GAP SERPL CALC-SCNC: 18 MMOL/L (ref 10–20)
ANION GAP SERPL CALC-SCNC: 18 MMOL/L (ref 10–20)
ANTIBODY SCREEN: NORMAL
APTT PPP: 30 SECONDS (ref 26–36)
AST SERPL W P-5'-P-CCNC: 14 U/L (ref 9–39)
BASE EXCESS BLDV CALC-SCNC: -2.5 MMOL/L (ref -2–3)
BASE EXCESS BLDV CALC-SCNC: 3.7 MMOL/L (ref -2–3)
BASOPHILS # BLD AUTO: 0 X10*3/UL (ref 0–0.1)
BASOPHILS NFR BLD AUTO: 0 %
BILIRUB DIRECT SERPL-MCNC: 0.1 MG/DL (ref 0–0.3)
BILIRUB SERPL-MCNC: 0.4 MG/DL (ref 0–1.2)
BODY TEMPERATURE: 37 DEGREES CELSIUS
BODY TEMPERATURE: 37 DEGREES CELSIUS
BUN SERPL-MCNC: 18 MG/DL (ref 6–23)
BUN SERPL-MCNC: 26 MG/DL (ref 6–23)
BUN SERPL-MCNC: 30 MG/DL (ref 6–23)
CA-I BLDV-SCNC: 1.14 MMOL/L (ref 1.1–1.33)
CA-I BLDV-SCNC: 1.19 MMOL/L (ref 1.1–1.33)
CALCIUM SERPL-MCNC: 8.5 MG/DL (ref 8.6–10.6)
CALCIUM SERPL-MCNC: 8.6 MG/DL (ref 8.6–10.6)
CALCIUM SERPL-MCNC: 9.2 MG/DL (ref 8.6–10.6)
CHLORIDE BLDV-SCNC: 95 MMOL/L (ref 98–107)
CHLORIDE BLDV-SCNC: 99 MMOL/L (ref 98–107)
CHLORIDE SERPL-SCNC: 95 MMOL/L (ref 98–107)
CHLORIDE SERPL-SCNC: 95 MMOL/L (ref 98–107)
CHLORIDE SERPL-SCNC: 96 MMOL/L (ref 98–107)
CMV IGG AVIDITY SERPL IA-RTO: REACTIVE %
CO2 SERPL-SCNC: 24 MMOL/L (ref 21–32)
CO2 SERPL-SCNC: 25 MMOL/L (ref 21–32)
CO2 SERPL-SCNC: 27 MMOL/L (ref 21–32)
CREAT SERPL-MCNC: 8.62 MG/DL (ref 0.5–1.3)
CREAT SERPL-MCNC: 9.66 MG/DL (ref 0.5–1.3)
CREAT SERPL-MCNC: 9.92 MG/DL (ref 0.5–1.3)
EBV NA AB SER QL: POSITIVE
EGFRCR SERPLBLD CKD-EPI 2021: 6 ML/MIN/1.73M*2
EGFRCR SERPLBLD CKD-EPI 2021: 6 ML/MIN/1.73M*2
EGFRCR SERPLBLD CKD-EPI 2021: 7 ML/MIN/1.73M*2
EOSINOPHIL # BLD AUTO: 0 X10*3/UL (ref 0–0.7)
EOSINOPHIL NFR BLD AUTO: 0 %
ERYTHROCYTE [DISTWIDTH] IN BLOOD BY AUTOMATED COUNT: 17.5 % (ref 11.5–14.5)
GLUCOSE BLD MANUAL STRIP-MCNC: 173 MG/DL (ref 74–99)
GLUCOSE BLD MANUAL STRIP-MCNC: 187 MG/DL (ref 74–99)
GLUCOSE BLDV-MCNC: 196 MG/DL (ref 74–99)
GLUCOSE BLDV-MCNC: 217 MG/DL (ref 74–99)
GLUCOSE SERPL-MCNC: 187 MG/DL (ref 74–99)
GLUCOSE SERPL-MCNC: 206 MG/DL (ref 74–99)
GLUCOSE SERPL-MCNC: 212 MG/DL (ref 74–99)
HBV CORE AB SER QL: NONREACTIVE
HBV SURFACE AB SER-ACNC: 282.6 MIU/ML
HBV SURFACE AG SERPL QL IA: NONREACTIVE
HCO3 BLDV-SCNC: 24.2 MMOL/L (ref 22–26)
HCO3 BLDV-SCNC: 28.5 MMOL/L (ref 22–26)
HCT VFR BLD AUTO: 36.4 % (ref 41–52)
HCT VFR BLD EST: 33 % (ref 41–52)
HCT VFR BLD EST: 41 % (ref 41–52)
HCV AB SER QL: NONREACTIVE
HGB BLD-MCNC: 10.8 G/DL (ref 13.5–17.5)
HGB BLDV-MCNC: 10.9 G/DL (ref 13.5–17.5)
HGB BLDV-MCNC: 13.7 G/DL (ref 13.5–17.5)
HIV 1+2 AB+HIV1 P24 AG SERPL QL IA: NONREACTIVE
IMM GRANULOCYTES # BLD AUTO: 0.06 X10*3/UL (ref 0–0.7)
IMM GRANULOCYTES NFR BLD AUTO: 0.6 % (ref 0–0.9)
INHALED O2 CONCENTRATION: 21 %
INHALED O2 CONCENTRATION: 32 %
INR PPP: 1 (ref 0.9–1.1)
LACTATE BLDV-SCNC: 1.9 MMOL/L (ref 0.4–2)
LACTATE BLDV-SCNC: 3.5 MMOL/L (ref 0.4–2)
LYMPHOCYTES # BLD AUTO: 0.08 X10*3/UL (ref 1.2–4.8)
LYMPHOCYTES NFR BLD AUTO: 0.8 %
MCH RBC QN AUTO: 24.2 PG (ref 26–34)
MCHC RBC AUTO-ENTMCNC: 29.7 G/DL (ref 32–36)
MCV RBC AUTO: 81 FL (ref 80–100)
MONOCYTES # BLD AUTO: 0.41 X10*3/UL (ref 0.1–1)
MONOCYTES NFR BLD AUTO: 4.1 %
NEUTROPHILS # BLD AUTO: 9.57 X10*3/UL (ref 1.2–7.7)
NEUTROPHILS NFR BLD AUTO: 94.5 %
NRBC BLD-RTO: 0 /100 WBCS (ref 0–0)
OXYHGB MFR BLDV: 63.5 % (ref 45–75)
OXYHGB MFR BLDV: 88.4 % (ref 45–75)
PCO2 BLDV: 43 MM HG (ref 41–51)
PCO2 BLDV: 48 MM HG (ref 41–51)
PH BLDV: 7.31 PH (ref 7.33–7.43)
PH BLDV: 7.43 PH (ref 7.33–7.43)
PHOSPHATE SERPL-MCNC: 3.8 MG/DL (ref 2.5–4.9)
PHOSPHATE SERPL-MCNC: 4.8 MG/DL (ref 2.5–4.9)
PHOSPHATE SERPL-MCNC: 5.2 MG/DL (ref 2.5–4.9)
PLATELET # BLD AUTO: 157 X10*3/UL (ref 150–450)
PO2 BLDV: 42 MM HG (ref 35–45)
PO2 BLDV: 56 MM HG (ref 35–45)
POTASSIUM BLDV-SCNC: 3.8 MMOL/L (ref 3.5–5.3)
POTASSIUM BLDV-SCNC: 5.6 MMOL/L (ref 3.5–5.3)
POTASSIUM SERPL-SCNC: 3.7 MMOL/L (ref 3.5–5.3)
POTASSIUM SERPL-SCNC: 5.1 MMOL/L (ref 3.5–5.3)
POTASSIUM SERPL-SCNC: 5.6 MMOL/L (ref 3.5–5.3)
PROT SERPL-MCNC: 7.2 G/DL (ref 6.4–8.2)
PROTHROMBIN TIME: 11.4 SECONDS (ref 9.8–12.4)
RBC # BLD AUTO: 4.47 X10*6/UL (ref 4.5–5.9)
RH FACTOR (ANTIGEN D): NORMAL
SAO2 % BLDV: 66 % (ref 45–75)
SAO2 % BLDV: 91 % (ref 45–75)
SODIUM BLDV-SCNC: 127 MMOL/L (ref 136–145)
SODIUM BLDV-SCNC: 134 MMOL/L (ref 136–145)
SODIUM SERPL-SCNC: 132 MMOL/L (ref 136–145)
SODIUM SERPL-SCNC: 132 MMOL/L (ref 136–145)
SODIUM SERPL-SCNC: 136 MMOL/L (ref 136–145)
WBC # BLD AUTO: 10.1 X10*3/UL (ref 4.4–11.3)

## 2025-04-19 PROCEDURE — 85025 COMPLETE CBC W/AUTO DIFF WBC: CPT | Performed by: STUDENT IN AN ORGANIZED HEALTH CARE EDUCATION/TRAINING PROGRAM

## 2025-04-19 PROCEDURE — 3700000002 HC GENERAL ANESTHESIA TIME - EACH INCREMENTAL 1 MINUTE: Performed by: STUDENT IN AN ORGANIZED HEALTH CARE EDUCATION/TRAINING PROGRAM

## 2025-04-19 PROCEDURE — 2500000005 HC RX 250 GENERAL PHARMACY W/O HCPCS

## 2025-04-19 PROCEDURE — 2780000003 HC OR 278 NO HCPCS: Performed by: STUDENT IN AN ORGANIZED HEALTH CARE EDUCATION/TRAINING PROGRAM

## 2025-04-19 PROCEDURE — 80069 RENAL FUNCTION PANEL: CPT | Mod: CCI

## 2025-04-19 PROCEDURE — 2500000004 HC RX 250 GENERAL PHARMACY W/ HCPCS (ALT 636 FOR OP/ED)

## 2025-04-19 PROCEDURE — 36415 COLL VENOUS BLD VENIPUNCTURE: CPT

## 2025-04-19 PROCEDURE — 2500000002 HC RX 250 W HCPCS SELF ADMINISTERED DRUGS (ALT 637 FOR MEDICARE OP, ALT 636 FOR OP/ED)

## 2025-04-19 PROCEDURE — 86826 HLA X-MATCH NONCYTOTOXC ADDL: CPT | Mod: OUT | Performed by: TRANSPLANT SURGERY

## 2025-04-19 PROCEDURE — 2500000001 HC RX 250 WO HCPCS SELF ADMINISTERED DRUGS (ALT 637 FOR MEDICARE OP)

## 2025-04-19 PROCEDURE — 86901 BLOOD TYPING SEROLOGIC RH(D): CPT

## 2025-04-19 PROCEDURE — 80048 BASIC METABOLIC PNL TOTAL CA: CPT

## 2025-04-19 PROCEDURE — 86706 HEP B SURFACE ANTIBODY: CPT

## 2025-04-19 PROCEDURE — 82248 BILIRUBIN DIRECT: CPT

## 2025-04-19 PROCEDURE — 82947 ASSAY GLUCOSE BLOOD QUANT: CPT

## 2025-04-19 PROCEDURE — 99222 1ST HOSP IP/OBS MODERATE 55: CPT | Performed by: STUDENT IN AN ORGANIZED HEALTH CARE EDUCATION/TRAINING PROGRAM

## 2025-04-19 PROCEDURE — 8120000002 HC CADAVER DONOR - KIDNEY: Performed by: STUDENT IN AN ORGANIZED HEALTH CARE EDUCATION/TRAINING PROGRAM

## 2025-04-19 PROCEDURE — 84100 ASSAY OF PHOSPHORUS: CPT

## 2025-04-19 PROCEDURE — 87340 HEPATITIS B SURFACE AG IA: CPT

## 2025-04-19 PROCEDURE — 86644 CMV ANTIBODY: CPT

## 2025-04-19 PROCEDURE — 85610 PROTHROMBIN TIME: CPT

## 2025-04-19 PROCEDURE — 50360 RNL ALTRNSPLJ W/O RCP NFRCT: CPT | Performed by: STUDENT IN AN ORGANIZED HEALTH CARE EDUCATION/TRAINING PROGRAM

## 2025-04-19 PROCEDURE — 0TY00Z0 TRANSPLANTATION OF RIGHT KIDNEY, ALLOGENEIC, OPEN APPROACH: ICD-10-PCS | Performed by: STUDENT IN AN ORGANIZED HEALTH CARE EDUCATION/TRAINING PROGRAM

## 2025-04-19 PROCEDURE — 80505 PATH CLIN CONSLTJ HIGH 41-60: CPT | Performed by: STUDENT IN AN ORGANIZED HEALTH CARE EDUCATION/TRAINING PROGRAM

## 2025-04-19 PROCEDURE — 82435 ASSAY OF BLOOD CHLORIDE: CPT | Performed by: STUDENT IN AN ORGANIZED HEALTH CARE EDUCATION/TRAINING PROGRAM

## 2025-04-19 PROCEDURE — 0T760DZ DILATION OF RIGHT URETER WITH INTRALUMINAL DEVICE, OPEN APPROACH: ICD-10-PCS | Performed by: STUDENT IN AN ORGANIZED HEALTH CARE EDUCATION/TRAINING PROGRAM

## 2025-04-19 PROCEDURE — 7100000002 HC RECOVERY ROOM TIME - EACH INCREMENTAL 1 MINUTE: Performed by: STUDENT IN AN ORGANIZED HEALTH CARE EDUCATION/TRAINING PROGRAM

## 2025-04-19 PROCEDURE — 2500000002 HC RX 250 W HCPCS SELF ADMINISTERED DRUGS (ALT 637 FOR MEDICARE OP, ALT 636 FOR OP/ED): Performed by: PHYSICIAN ASSISTANT

## 2025-04-19 PROCEDURE — 2500000004 HC RX 250 GENERAL PHARMACY W/ HCPCS (ALT 636 FOR OP/ED): Mod: JZ

## 2025-04-19 PROCEDURE — 86825 HLA X-MATH NON-CYTOTOXIC: CPT | Mod: OUT | Performed by: TRANSPLANT SURGERY

## 2025-04-19 PROCEDURE — P9045 ALBUMIN (HUMAN), 5%, 250 ML: HCPCS | Mod: JZ,TB

## 2025-04-19 PROCEDURE — 87522 HEPATITIS C REVRS TRNSCRPJ: CPT

## 2025-04-19 PROCEDURE — 3700000001 HC GENERAL ANESTHESIA TIME - INITIAL BASE CHARGE: Performed by: STUDENT IN AN ORGANIZED HEALTH CARE EDUCATION/TRAINING PROGRAM

## 2025-04-19 PROCEDURE — 50605 INSERT URETERAL SUPPORT: CPT | Performed by: STUDENT IN AN ORGANIZED HEALTH CARE EDUCATION/TRAINING PROGRAM

## 2025-04-19 PROCEDURE — 76776 US EXAM K TRANSPL W/DOPPLER: CPT | Performed by: RADIOLOGY

## 2025-04-19 PROCEDURE — 87389 HIV-1 AG W/HIV-1&-2 AB AG IA: CPT

## 2025-04-19 PROCEDURE — 86664 EPSTEIN-BARR NUCLEAR ANTIGEN: CPT

## 2025-04-19 PROCEDURE — 86704 HEP B CORE ANTIBODY TOTAL: CPT

## 2025-04-19 PROCEDURE — 2500000004 HC RX 250 GENERAL PHARMACY W/ HCPCS (ALT 636 FOR OP/ED): Mod: JZ,TB

## 2025-04-19 PROCEDURE — 82306 VITAMIN D 25 HYDROXY: CPT | Performed by: PHYSICIAN ASSISTANT

## 2025-04-19 PROCEDURE — 3600000009 HC OR TIME - EACH INCREMENTAL 1 MINUTE - PROCEDURE LEVEL FOUR: Performed by: STUDENT IN AN ORGANIZED HEALTH CARE EDUCATION/TRAINING PROGRAM

## 2025-04-19 PROCEDURE — 7100000001 HC RECOVERY ROOM TIME - INITIAL BASE CHARGE: Performed by: STUDENT IN AN ORGANIZED HEALTH CARE EDUCATION/TRAINING PROGRAM

## 2025-04-19 PROCEDURE — 76776 US EXAM K TRANSPL W/DOPPLER: CPT

## 2025-04-19 PROCEDURE — 86803 HEPATITIS C AB TEST: CPT

## 2025-04-19 PROCEDURE — 84132 ASSAY OF SERUM POTASSIUM: CPT | Performed by: STUDENT IN AN ORGANIZED HEALTH CARE EDUCATION/TRAINING PROGRAM

## 2025-04-19 PROCEDURE — 2720000007 HC OR 272 NO HCPCS: Performed by: STUDENT IN AN ORGANIZED HEALTH CARE EDUCATION/TRAINING PROGRAM

## 2025-04-19 PROCEDURE — 2500000004 HC RX 250 GENERAL PHARMACY W/ HCPCS (ALT 636 FOR OP/ED): Mod: JZ | Performed by: STUDENT IN AN ORGANIZED HEALTH CARE EDUCATION/TRAINING PROGRAM

## 2025-04-19 PROCEDURE — C2617 STENT, NON-COR, TEM W/O DEL: HCPCS | Performed by: STUDENT IN AN ORGANIZED HEALTH CARE EDUCATION/TRAINING PROGRAM

## 2025-04-19 PROCEDURE — 3600000004 HC OR TIME - INITIAL BASE CHARGE - PROCEDURE LEVEL FOUR: Performed by: STUDENT IN AN ORGANIZED HEALTH CARE EDUCATION/TRAINING PROGRAM

## 2025-04-19 PROCEDURE — 1200000002 HC GENERAL ROOM WITH TELEMETRY DAILY

## 2025-04-19 PROCEDURE — 2500000001 HC RX 250 WO HCPCS SELF ADMINISTERED DRUGS (ALT 637 FOR MEDICARE OP): Performed by: STUDENT IN AN ORGANIZED HEALTH CARE EDUCATION/TRAINING PROGRAM

## 2025-04-19 PROCEDURE — 94640 AIRWAY INHALATION TREATMENT: CPT

## 2025-04-19 DEVICE — 6.0FR(2MM)X 12 CM SURGITEK DOUBLE-J CLOSED TIP URETERAL STENT
Type: IMPLANTABLE DEVICE | Site: URETER | Status: NON-FUNCTIONAL
Brand: DOUBLE-J

## 2025-04-19 RX ORDER — ALBUTEROL SULFATE 0.83 MG/ML
2.5 SOLUTION RESPIRATORY (INHALATION) ONCE
Status: COMPLETED | OUTPATIENT
Start: 2025-04-19 | End: 2025-04-19

## 2025-04-19 RX ORDER — ACETAMINOPHEN 325 MG/1
650 TABLET ORAL EVERY 6 HOURS PRN
Status: DISCONTINUED | OUTPATIENT
Start: 2025-04-21 | End: 2025-04-19

## 2025-04-19 RX ORDER — DEXTROSE 50 % IN WATER (D50W) INTRAVENOUS SYRINGE
25
Status: DISCONTINUED | OUTPATIENT
Start: 2025-04-19 | End: 2025-04-19 | Stop reason: SDUPTHER

## 2025-04-19 RX ORDER — PANTOPRAZOLE SODIUM 40 MG/1
40 TABLET, DELAYED RELEASE ORAL
Status: DISCONTINUED | OUTPATIENT
Start: 2025-04-20 | End: 2025-04-26 | Stop reason: HOSPADM

## 2025-04-19 RX ORDER — MYCOPHENOLATE MOFETIL 250 MG/1
1000 CAPSULE ORAL EVERY 12 HOURS
Status: DISCONTINUED | OUTPATIENT
Start: 2025-04-19 | End: 2025-04-19

## 2025-04-19 RX ORDER — ONDANSETRON HYDROCHLORIDE 2 MG/ML
INJECTION, SOLUTION INTRAVENOUS AS NEEDED
Status: DISCONTINUED | OUTPATIENT
Start: 2025-04-19 | End: 2025-04-19

## 2025-04-19 RX ORDER — PROPOFOL 10 MG/ML
INJECTION, EMULSION INTRAVENOUS AS NEEDED
Status: DISCONTINUED | OUTPATIENT
Start: 2025-04-19 | End: 2025-04-19

## 2025-04-19 RX ORDER — PREDNISONE 10 MG/1
20 TABLET ORAL DAILY
Status: DISCONTINUED | OUTPATIENT
Start: 2025-04-23 | End: 2025-04-26 | Stop reason: HOSPADM

## 2025-04-19 RX ORDER — OXYCODONE HYDROCHLORIDE 5 MG/1
5 TABLET ORAL EVERY 4 HOURS PRN
Status: DISCONTINUED | OUTPATIENT
Start: 2025-04-19 | End: 2025-04-19 | Stop reason: HOSPADM

## 2025-04-19 RX ORDER — PHENYLEPHRINE HCL IN 0.9% NACL 0.4MG/10ML
SYRINGE (ML) INTRAVENOUS AS NEEDED
Status: DISCONTINUED | OUTPATIENT
Start: 2025-04-19 | End: 2025-04-19

## 2025-04-19 RX ORDER — MANNITOL 20 G/100ML
INJECTION, SOLUTION INTRAVENOUS AS NEEDED
Status: DISCONTINUED | OUTPATIENT
Start: 2025-04-19 | End: 2025-04-19

## 2025-04-19 RX ORDER — FUROSEMIDE 10 MG/ML
INJECTION INTRAMUSCULAR; INTRAVENOUS AS NEEDED
Status: DISCONTINUED | OUTPATIENT
Start: 2025-04-19 | End: 2025-04-19

## 2025-04-19 RX ORDER — ALBUTEROL SULFATE 0.83 MG/ML
SOLUTION RESPIRATORY (INHALATION) AS NEEDED
Status: DISCONTINUED | OUTPATIENT
Start: 2025-04-19 | End: 2025-04-19

## 2025-04-19 RX ORDER — ACETAMINOPHEN 325 MG/1
650 TABLET ORAL EVERY 6 HOURS SCHEDULED
Status: DISPENSED | OUTPATIENT
Start: 2025-04-20 | End: 2025-04-21

## 2025-04-19 RX ORDER — HYDROMORPHONE HYDROCHLORIDE 0.2 MG/ML
0.2 INJECTION INTRAMUSCULAR; INTRAVENOUS; SUBCUTANEOUS EVERY 5 MIN PRN
Status: DISCONTINUED | OUTPATIENT
Start: 2025-04-19 | End: 2025-04-19 | Stop reason: HOSPADM

## 2025-04-19 RX ORDER — HYDRALAZINE HYDROCHLORIDE 20 MG/ML
5 INJECTION INTRAMUSCULAR; INTRAVENOUS EVERY 30 MIN PRN
Status: DISCONTINUED | OUTPATIENT
Start: 2025-04-19 | End: 2025-04-19 | Stop reason: HOSPADM

## 2025-04-19 RX ORDER — MIDAZOLAM HYDROCHLORIDE 1 MG/ML
INJECTION INTRAMUSCULAR; INTRAVENOUS CONTINUOUS PRN
Status: DISCONTINUED | OUTPATIENT
Start: 2025-04-19 | End: 2025-04-19

## 2025-04-19 RX ORDER — DEXTROSE 50 % IN WATER (D50W) INTRAVENOUS SYRINGE
25
Status: DISCONTINUED | OUTPATIENT
Start: 2025-04-19 | End: 2025-04-22

## 2025-04-19 RX ORDER — LIDOCAINE HYDROCHLORIDE 20 MG/ML
INJECTION, SOLUTION INFILTRATION; PERINEURAL AS NEEDED
Status: DISCONTINUED | OUTPATIENT
Start: 2025-04-19 | End: 2025-04-19

## 2025-04-19 RX ORDER — OXYCODONE HYDROCHLORIDE 5 MG/1
10 TABLET ORAL EVERY 4 HOURS PRN
Status: DISCONTINUED | OUTPATIENT
Start: 2025-04-19 | End: 2025-04-19 | Stop reason: HOSPADM

## 2025-04-19 RX ORDER — SODIUM CHLORIDE 9 MG/ML
999 INJECTION, SOLUTION INTRAVENOUS CONTINUOUS
Status: ACTIVE | OUTPATIENT
Start: 2025-04-19 | End: 2025-04-20

## 2025-04-19 RX ORDER — INSULIN LISPRO 100 [IU]/ML
0-10 INJECTION, SOLUTION INTRAVENOUS; SUBCUTANEOUS
Status: DISCONTINUED | OUTPATIENT
Start: 2025-04-19 | End: 2025-04-20

## 2025-04-19 RX ORDER — FUROSEMIDE 10 MG/ML
80 INJECTION INTRAMUSCULAR; INTRAVENOUS EVERY 8 HOURS
Status: DISCONTINUED | OUTPATIENT
Start: 2025-04-19 | End: 2025-04-22

## 2025-04-19 RX ORDER — TACROLIMUS 1 MG/1
1 CAPSULE ORAL EVERY 12 HOURS
Status: DISCONTINUED | OUTPATIENT
Start: 2025-04-19 | End: 2025-04-19

## 2025-04-19 RX ORDER — SODIUM CHLORIDE 450 MG/100ML
60 INJECTION, SOLUTION INTRAVENOUS CONTINUOUS
Status: DISCONTINUED | OUTPATIENT
Start: 2025-04-19 | End: 2025-04-20

## 2025-04-19 RX ORDER — DEXTROSE 50 % IN WATER (D50W) INTRAVENOUS SYRINGE
12.5
Status: DISCONTINUED | OUTPATIENT
Start: 2025-04-19 | End: 2025-04-22

## 2025-04-19 RX ORDER — DEXTROSE 50 % IN WATER (D50W) INTRAVENOUS SYRINGE
12.5
Status: DISCONTINUED | OUTPATIENT
Start: 2025-04-19 | End: 2025-04-19 | Stop reason: SDUPTHER

## 2025-04-19 RX ORDER — LABETALOL HYDROCHLORIDE 5 MG/ML
5 INJECTION, SOLUTION INTRAVENOUS ONCE AS NEEDED
Status: DISCONTINUED | OUTPATIENT
Start: 2025-04-19 | End: 2025-04-19 | Stop reason: HOSPADM

## 2025-04-19 RX ORDER — ALBUTEROL SULFATE 0.83 MG/ML
2.5 SOLUTION RESPIRATORY (INHALATION) ONCE AS NEEDED
Status: DISCONTINUED | OUTPATIENT
Start: 2025-04-19 | End: 2025-04-19 | Stop reason: HOSPADM

## 2025-04-19 RX ORDER — MYCOPHENOLATE MOFETIL 250 MG/1
1000 CAPSULE ORAL
Status: DISCONTINUED | OUTPATIENT
Start: 2025-04-20 | End: 2025-04-26 | Stop reason: HOSPADM

## 2025-04-19 RX ORDER — PANTOPRAZOLE SODIUM 40 MG/1
40 TABLET, DELAYED RELEASE ORAL
Status: DISCONTINUED | OUTPATIENT
Start: 2025-04-19 | End: 2025-04-19

## 2025-04-19 RX ORDER — CEFAZOLIN 1 G/1
INJECTION, POWDER, FOR SOLUTION INTRAVENOUS AS NEEDED
Status: DISCONTINUED | OUTPATIENT
Start: 2025-04-19 | End: 2025-04-19

## 2025-04-19 RX ORDER — FENTANYL CITRATE 50 UG/ML
INJECTION, SOLUTION INTRAMUSCULAR; INTRAVENOUS AS NEEDED
Status: DISCONTINUED | OUTPATIENT
Start: 2025-04-19 | End: 2025-04-19

## 2025-04-19 RX ORDER — SODIUM CHLORIDE, SODIUM LACTATE, POTASSIUM CHLORIDE, CALCIUM CHLORIDE 600; 310; 30; 20 MG/100ML; MG/100ML; MG/100ML; MG/100ML
75 INJECTION, SOLUTION INTRAVENOUS CONTINUOUS
Status: ACTIVE | OUTPATIENT
Start: 2025-04-19 | End: 2025-04-19

## 2025-04-19 RX ORDER — CLOTRIMAZOLE 10 MG/1
10 LOZENGE ORAL
Status: DISCONTINUED | OUTPATIENT
Start: 2025-04-20 | End: 2025-04-26 | Stop reason: HOSPADM

## 2025-04-19 RX ORDER — CLOTRIMAZOLE 10 MG/1
10 LOZENGE ORAL
Status: DISCONTINUED | OUTPATIENT
Start: 2025-04-20 | End: 2025-04-19 | Stop reason: SDUPTHER

## 2025-04-19 RX ORDER — TACROLIMUS 1 MG/1
1 CAPSULE ORAL
Status: DISCONTINUED | OUTPATIENT
Start: 2025-04-20 | End: 2025-04-21

## 2025-04-19 RX ORDER — ROCURONIUM BROMIDE 10 MG/ML
INJECTION, SOLUTION INTRAVENOUS AS NEEDED
Status: DISCONTINUED | OUTPATIENT
Start: 2025-04-19 | End: 2025-04-19

## 2025-04-19 RX ORDER — ALBUMIN HUMAN 50 G/1000ML
SOLUTION INTRAVENOUS AS NEEDED
Status: DISCONTINUED | OUTPATIENT
Start: 2025-04-19 | End: 2025-04-19

## 2025-04-19 RX ORDER — ACETAMINOPHEN 325 MG/1
975 TABLET ORAL EVERY 6 HOURS PRN
Status: DISCONTINUED | OUTPATIENT
Start: 2025-04-19 | End: 2025-04-19 | Stop reason: HOSPADM

## 2025-04-19 RX ORDER — SODIUM CHLORIDE 9 MG/ML
75 INJECTION, SOLUTION INTRAVENOUS CONTINUOUS
Status: DISCONTINUED | OUTPATIENT
Start: 2025-04-19 | End: 2025-04-19

## 2025-04-19 RX ORDER — ACETAMINOPHEN 325 MG/1
975 TABLET ORAL ONCE
Status: DISCONTINUED | OUTPATIENT
Start: 2025-04-19 | End: 2025-04-19 | Stop reason: HOSPADM

## 2025-04-19 RX ORDER — PANTOPRAZOLE SODIUM 40 MG/1
40 TABLET, DELAYED RELEASE ORAL
Status: DISCONTINUED | OUTPATIENT
Start: 2025-04-20 | End: 2025-04-19 | Stop reason: SDUPTHER

## 2025-04-19 RX ORDER — HYDROMORPHONE HYDROCHLORIDE 1 MG/ML
INJECTION, SOLUTION INTRAMUSCULAR; INTRAVENOUS; SUBCUTANEOUS AS NEEDED
Status: DISCONTINUED | OUTPATIENT
Start: 2025-04-19 | End: 2025-04-19

## 2025-04-19 RX ORDER — SENNOSIDES 8.6 MG/1
1 TABLET ORAL 2 TIMES DAILY
Status: DISCONTINUED | OUTPATIENT
Start: 2025-04-19 | End: 2025-04-23

## 2025-04-19 RX ORDER — ONDANSETRON HYDROCHLORIDE 2 MG/ML
4 INJECTION, SOLUTION INTRAVENOUS ONCE AS NEEDED
Status: DISCONTINUED | OUTPATIENT
Start: 2025-04-19 | End: 2025-04-19 | Stop reason: HOSPADM

## 2025-04-19 RX ORDER — NALOXONE HYDROCHLORIDE 0.4 MG/ML
0.2 INJECTION, SOLUTION INTRAMUSCULAR; INTRAVENOUS; SUBCUTANEOUS EVERY 5 MIN PRN
Status: DISCONTINUED | OUTPATIENT
Start: 2025-04-19 | End: 2025-04-26 | Stop reason: HOSPADM

## 2025-04-19 RX ORDER — CEFAZOLIN SODIUM 2 G/100ML
2 INJECTION, SOLUTION INTRAVENOUS EVERY 8 HOURS
Status: COMPLETED | OUTPATIENT
Start: 2025-04-19 | End: 2025-04-20

## 2025-04-19 RX ORDER — OXYCODONE HYDROCHLORIDE 5 MG/1
5 TABLET ORAL EVERY 4 HOURS PRN
Status: DISPENSED | OUTPATIENT
Start: 2025-04-19 | End: 2025-04-22

## 2025-04-19 RX ORDER — ACETAMINOPHEN 325 MG/1
TABLET ORAL AS NEEDED
Status: DISCONTINUED | OUTPATIENT
Start: 2025-04-19 | End: 2025-04-19

## 2025-04-19 RX ORDER — ONDANSETRON HYDROCHLORIDE 2 MG/ML
4 INJECTION, SOLUTION INTRAVENOUS EVERY 8 HOURS PRN
Status: DISCONTINUED | OUTPATIENT
Start: 2025-04-19 | End: 2025-04-26 | Stop reason: HOSPADM

## 2025-04-19 RX ORDER — ONDANSETRON 4 MG/1
4 TABLET, FILM COATED ORAL EVERY 8 HOURS PRN
Status: DISCONTINUED | OUTPATIENT
Start: 2025-04-19 | End: 2025-04-26 | Stop reason: HOSPADM

## 2025-04-19 RX ORDER — POLYETHYLENE GLYCOL 3350 17 G/17G
17 POWDER, FOR SOLUTION ORAL DAILY
Status: DISCONTINUED | OUTPATIENT
Start: 2025-04-20 | End: 2025-04-23

## 2025-04-19 RX ADMIN — Medication 80 MCG: at 13:09

## 2025-04-19 RX ADMIN — SUGAMMADEX 400 MG: 100 INJECTION, SOLUTION INTRAVENOUS at 14:47

## 2025-04-19 RX ADMIN — ONDANSETRON 4 MG: 2 INJECTION, SOLUTION INTRAMUSCULAR; INTRAVENOUS at 14:04

## 2025-04-19 RX ADMIN — SODIUM ZIRCONIUM CYCLOSILICATE 5 G: 5 POWDER, FOR SUSPENSION ORAL at 21:09

## 2025-04-19 RX ADMIN — HEPARIN SODIUM 1000 UNITS/HR: 10000 INJECTION INTRAVENOUS; SUBCUTANEOUS at 13:42

## 2025-04-19 RX ADMIN — INSULIN HUMAN 20 UNITS: 100 INJECTION, SUSPENSION SUBCUTANEOUS at 19:03

## 2025-04-19 RX ADMIN — SENNOSIDES 8.6 MG: 8.6 TABLET, FILM COATED ORAL at 23:45

## 2025-04-19 RX ADMIN — HYDROMORPHONE HYDROCHLORIDE 0.5 MG: 0.5 INJECTION, SOLUTION INTRAMUSCULAR; INTRAVENOUS; SUBCUTANEOUS at 20:28

## 2025-04-19 RX ADMIN — Medication 80 MCG: at 12:50

## 2025-04-19 RX ADMIN — DEXAMETHASONE SODIUM PHOSPHATE 4 MG: 4 INJECTION, SOLUTION INTRA-ARTICULAR; INTRALESIONAL; INTRAMUSCULAR; INTRAVENOUS; SOFT TISSUE at 10:30

## 2025-04-19 RX ADMIN — HYDROMORPHONE HYDROCHLORIDE 0.3 MG: 1 INJECTION, SOLUTION INTRAMUSCULAR; INTRAVENOUS; SUBCUTANEOUS at 14:43

## 2025-04-19 RX ADMIN — LIDOCAINE HYDROCHLORIDE 100 MG: 20 INJECTION, SOLUTION INFILTRATION; PERINEURAL at 09:44

## 2025-04-19 RX ADMIN — HYDROMORPHONE HYDROCHLORIDE 0.5 MG: 1 INJECTION, SOLUTION INTRAMUSCULAR; INTRAVENOUS; SUBCUTANEOUS at 14:03

## 2025-04-19 RX ADMIN — PROPOFOL 200 MG: 10 INJECTION, EMULSION INTRAVENOUS at 09:44

## 2025-04-19 RX ADMIN — ACETAMINOPHEN 650 MG: 325 TABLET, FILM COATED ORAL at 23:43

## 2025-04-19 RX ADMIN — ACETAMINOPHEN 975 MG: 325 TABLET ORAL at 08:36

## 2025-04-19 RX ADMIN — Medication 40 MCG: at 12:40

## 2025-04-19 RX ADMIN — ROCURONIUM 30 MG: 50 INJECTION, SOLUTION INTRAVENOUS at 10:35

## 2025-04-19 RX ADMIN — ALBUMIN HUMAN 250 ML: 0.05 INJECTION, SOLUTION INTRAVENOUS at 12:28

## 2025-04-19 RX ADMIN — MYCOPHENOLATE MOFETIL 1000 MG: 250 CAPSULE ORAL at 23:45

## 2025-04-19 RX ADMIN — SODIUM CHLORIDE, SODIUM LACTATE, POTASSIUM CHLORIDE, AND CALCIUM CHLORIDE: 600; 310; 30; 20 INJECTION, SOLUTION INTRAVENOUS at 09:44

## 2025-04-19 RX ADMIN — ROCURONIUM 100 MG: 50 INJECTION, SOLUTION INTRAVENOUS at 09:45

## 2025-04-19 RX ADMIN — Medication 3 L/MIN: at 19:15

## 2025-04-19 RX ADMIN — DEXTROSE MONOHYDRATE 500 MG: 50 INJECTION, SOLUTION INTRAVENOUS at 10:05

## 2025-04-19 RX ADMIN — ROCURONIUM 10 MG: 50 INJECTION, SOLUTION INTRAVENOUS at 12:47

## 2025-04-19 RX ADMIN — HYDROMORPHONE HYDROCHLORIDE 0.5 MG: 0.5 INJECTION, SOLUTION INTRAMUSCULAR; INTRAVENOUS; SUBCUTANEOUS at 15:59

## 2025-04-19 RX ADMIN — FUROSEMIDE 100 MG: 10 INJECTION INTRAMUSCULAR; INTRAVENOUS at 13:08

## 2025-04-19 RX ADMIN — ALBUTEROL SULFATE 2.5 MG: 2.5 SOLUTION RESPIRATORY (INHALATION) at 09:00

## 2025-04-19 RX ADMIN — PROPOFOL 30 MG: 10 INJECTION, EMULSION INTRAVENOUS at 14:42

## 2025-04-19 RX ADMIN — SODIUM CHLORIDE 75 ML/HR: 9 INJECTION, SOLUTION INTRAVENOUS at 01:41

## 2025-04-19 RX ADMIN — HEPARIN SODIUM 200 MG: 1000 INJECTION INTRAVENOUS; SUBCUTANEOUS at 11:31

## 2025-04-19 RX ADMIN — FENTANYL CITRATE 100 MCG: 50 INJECTION, SOLUTION INTRAMUSCULAR; INTRAVENOUS at 09:44

## 2025-04-19 RX ADMIN — ACETAMINOPHEN 975 MG: 325 TABLET, FILM COATED ORAL at 08:36

## 2025-04-19 RX ADMIN — ALBUTEROL SULFATE 2.5 MG: 2.5 SOLUTION RESPIRATORY (INHALATION) at 08:59

## 2025-04-19 RX ADMIN — MANNITOL 25 G: 20 INJECTION, SOLUTION INTRAVENOUS at 13:08

## 2025-04-19 RX ADMIN — CEFAZOLIN 3 G: 1 INJECTION, POWDER, FOR SOLUTION INTRAMUSCULAR; INTRAVENOUS at 10:30

## 2025-04-19 RX ADMIN — HYDROMORPHONE HYDROCHLORIDE 0.2 MG: 1 INJECTION, SOLUTION INTRAMUSCULAR; INTRAVENOUS; SUBCUTANEOUS at 14:28

## 2025-04-19 RX ADMIN — HYDROMORPHONE HYDROCHLORIDE 0.5 MG: 0.5 INJECTION, SOLUTION INTRAMUSCULAR; INTRAVENOUS; SUBCUTANEOUS at 16:18

## 2025-04-19 RX ADMIN — SODIUM CHLORIDE 500 ML: 9 INJECTION, SOLUTION INTRAVENOUS at 21:17

## 2025-04-19 RX ADMIN — SODIUM CHLORIDE 200 ML/HR: 9 INJECTION, SOLUTION INTRAVENOUS at 15:00

## 2025-04-19 RX ADMIN — ALBUMIN HUMAN 250 ML: 0.05 INJECTION, SOLUTION INTRAVENOUS at 13:08

## 2025-04-19 RX ADMIN — FUROSEMIDE 80 MG: 10 INJECTION, SOLUTION INTRAVENOUS at 18:40

## 2025-04-19 RX ADMIN — Medication 6 L/MIN: at 15:00

## 2025-04-19 RX ADMIN — OXYCODONE 5 MG: 5 TABLET ORAL at 23:44

## 2025-04-19 RX ADMIN — SODIUM CHLORIDE 60 ML/HR: 4.5 INJECTION, SOLUTION INTRAVENOUS at 15:00

## 2025-04-19 RX ADMIN — LIDOCAINE HYDROCHLORIDE 50 MG: 20 INJECTION, SOLUTION INFILTRATION; PERINEURAL at 14:36

## 2025-04-19 SDOH — HEALTH STABILITY: MENTAL HEALTH: CURRENT SMOKER: 0

## 2025-04-19 ASSESSMENT — PAIN - FUNCTIONAL ASSESSMENT
PAIN_FUNCTIONAL_ASSESSMENT: 0-10
PAIN_FUNCTIONAL_ASSESSMENT: UNABLE TO SELF-REPORT
PAIN_FUNCTIONAL_ASSESSMENT: 0-10

## 2025-04-19 ASSESSMENT — COGNITIVE AND FUNCTIONAL STATUS - GENERAL
DAILY ACTIVITIY SCORE: 24
MOBILITY SCORE: 24

## 2025-04-19 ASSESSMENT — PAIN SCALES - GENERAL
PAINLEVEL_OUTOF10: 8
PAINLEVEL_OUTOF10: 0 - NO PAIN
PAINLEVEL_OUTOF10: 0 - NO PAIN
PAINLEVEL_OUTOF10: 6
PAINLEVEL_OUTOF10: 5 - MODERATE PAIN
PAINLEVEL_OUTOF10: 10 - WORST POSSIBLE PAIN
PAINLEVEL_OUTOF10: 3
PAINLEVEL_OUTOF10: 6
PAINLEVEL_OUTOF10: 0 - NO PAIN
PAINLEVEL_OUTOF10: 6
PAINLEVEL_OUTOF10: 8
PAINLEVEL_OUTOF10: 5 - MODERATE PAIN

## 2025-04-19 ASSESSMENT — PAIN DESCRIPTION - ORIENTATION: ORIENTATION: RIGHT

## 2025-04-19 ASSESSMENT — PAIN SCALES - PAIN ASSESSMENT IN ADVANCED DEMENTIA (PAINAD)
BREATHING: NORMAL
BREATHING: NORMAL
TOTALSCORE: MEDICATION (SEE MAR)

## 2025-04-19 ASSESSMENT — PAIN DESCRIPTION - LOCATION: LOCATION: ABDOMEN

## 2025-04-19 NOTE — CONSULTS
"Reason For Consult  Intra-op Difficult Hyatt placement    History Of Present Illness  Tanmay Mcneill is a 39 y.o. male with PMH ESRD (anuric), Asthma, T2DM, HTN, obesity admitted for R DDKT 04/19 (Dr. Ontiveros).     Urology was consulted intraoperatively due to difficult hyatt catheter placement therefore patient history and ROS was obtained via chart review and per primary team. No urologic history other than microscopic hematuria 2015 (seen at [facility], completion of workup unclear) and R hydrocele (2024).      Past Medical History  He has a past medical history of Diabetes mellitus (Multi) and Hypertension.    Surgical History  He has a past surgical history that includes Other surgical history (12/18/2017) and Other surgical history (04/08/2020).     Social History  He reports that he has never smoked. He has never been exposed to tobacco smoke. He has never used smokeless tobacco. He reports that he does not drink alcohol and does not use drugs.    Family History  Family History[1]     Allergies  Patient has no known allergies.    Review of Systems  Unable to obtain      Physical Exam  Physical Exam  Constitutional:       Appearance: Normal appearance.      Comments: Intubated/sedated   HENT:      Head: Normocephalic and atraumatic.   Cardiovascular:      Rate and Rhythm: Normal rate.   Abdominal:      General: There is no distension.      Palpations: Abdomen is soft.   Genitourinary:     Comments: Partially buried penis with circumcised phallus with mild redundant foreskin, normal meatus.  Skin:     General: Skin is warm and dry.   Neurological:      Comments: sedated            Last Recorded Vitals  Blood pressure 149/67, pulse 75, temperature 36 °C (96.8 °F), temperature source Temporal, resp. rate 16, height 1.854 m (6' 1\"), weight (!) 153 kg (336 lb 12.8 oz), SpO2 99%.    Relevant Results  Results for orders placed or performed during the hospital encounter of 04/18/25 (from the past 24 hours)   POCT GLUCOSE "   Result Value Ref Range    POCT Glucose 189 (H) 74 - 99 mg/dL   HIV 1/2 Antigen/Antibody Screen with Reflex to Confirmation   Result Value Ref Range    HIV 1/2 Antigen/Antibody Screen with Reflex to Confirmation Nonreactive Nonreactive   Type and screen   Result Value Ref Range    ABO TYPE O     Rh TYPE POS     ANTIBODY SCREEN NEG    Blood Gas Venous Full Panel   Result Value Ref Range    POCT pH, Venous 7.43 7.33 - 7.43 pH    POCT pCO2, Venous 43 41 - 51 mm Hg    POCT pO2, Venous 56 (H) 35 - 45 mm Hg    POCT SO2, Venous 91 (H) 45 - 75 %    POCT Oxy Hemoglobin, Venous 88.4 (H) 45.0 - 75.0 %    POCT Hematocrit Calculated, Venous 33.0 (L) 41.0 - 52.0 %    POCT Sodium, Venous 134 (L) 136 - 145 mmol/L    POCT Potassium, Venous 3.8 3.5 - 5.3 mmol/L    POCT Chloride, Venous 99 98 - 107 mmol/L    POCT Ionized Calicum, Venous 1.14 1.10 - 1.33 mmol/L    POCT Glucose, Venous 196 (H) 74 - 99 mg/dL    POCT Lactate, Venous 1.9 0.4 - 2.0 mmol/L    POCT Base Excess, Venous 3.7 (H) -2.0 - 3.0 mmol/L    POCT HCO3 Calculated, Venous 28.5 (H) 22.0 - 26.0 mmol/L    POCT Hemoglobin, Venous 10.9 (L) 13.5 - 17.5 g/dL    POCT Anion Gap, Venous 10.0 10.0 - 25.0 mmol/L    Patient Temperature 37.0 degrees Celsius    FiO2 21 %   Coagulation Screen   Result Value Ref Range    Protime 11.4 9.8 - 12.4 seconds    INR 1.0 0.9 - 1.1    aPTT 30 26 - 36 seconds   Cytomegalovirus IgG   Result Value Ref Range    Cytomegalovirus IgG Reactive (A) Nonreactive   Hepatic Function Panel   Result Value Ref Range    Albumin 4.0 3.4 - 5.0 g/dL    Bilirubin, Total 0.4 0.0 - 1.2 mg/dL    Bilirubin, Direct 0.1 0.0 - 0.3 mg/dL    Alkaline Phosphatase 88 33 - 120 U/L    ALT 15 10 - 52 U/L    AST 14 9 - 39 U/L    Total Protein 7.2 6.4 - 8.2 g/dL   Hepatitis C Antibody   Result Value Ref Range    Hepatitis C AB Nonreactive Nonreactive   Hepatitis B Core Antibody, Total   Result Value Ref Range    Hepatitis B Core AB- Total Nonreactive Nonreactive   Hepatitis B Surface  Antibody   Result Value Ref Range    Hepatitis B Surface .6 (H) <10.0 mIU/mL   Hepatitis B Surface Antigen   Result Value Ref Range    Hepatitis B Surface AG Nonreactive Nonreactive   Phosphorus   Result Value Ref Range    Phosphorus 3.8 2.5 - 4.9 mg/dL   Basic Metabolic Panel   Result Value Ref Range    Glucose 187 (H) 74 - 99 mg/dL    Sodium 136 136 - 145 mmol/L    Potassium 3.7 3.5 - 5.3 mmol/L    Chloride 96 (L) 98 - 107 mmol/L    Bicarbonate 27 21 - 32 mmol/L    Anion Gap 17 10 - 20 mmol/L    Urea Nitrogen 18 6 - 23 mg/dL    Creatinine 8.62 (H) 0.50 - 1.30 mg/dL    eGFR 7 (L) >60 mL/min/1.73m*2    Calcium 9.2 8.6 - 10.6 mg/dL   POCT GLUCOSE   Result Value Ref Range    POCT Glucose 173 (H) 74 - 99 mg/dL            Assessment/Plan     Tanmay Mcneill is a 39 y.o. male with PMH ESRD (anuric), Asthma, T2DM, HTN, obesity admitted for R DDKT 04/19 (Dr. Ontiveros).     Urology was consulted intraoperatively due to difficult hyatt catheter placement     Procedure note:  The urethra was prepped and draped in the usual sterile fashion. Lubricating jelly was injected into the urethra. Prior attempt to place both 16Fr standard and 14Fr coude by OR team and attending were unsuccessful. Gentle attempts at placing 16Fr and 14 Fr coude catheters were attempted by urology without success. Next, attempts were made at advancing both a hybrid and an angled glide wire through a 5 Fr open ended catheter into the urethra were made however these were also unsuccessful, therefore decision was made to pursue flexible cystoscopy. A 16Fr flexible cystoscope was inserted per urethra. Distal urethra was normal however large false passage partially obscured by clot was noted just distal to the bulbar urethra. No obvious lumen was initially seen, however upon gentle probing with hybrid wire, true urethral lumen was noted and significant tissue flap was able to be cannulated at ~the 7 o 'clock position. Attempt was made to advance scope over  wire into bladder under direct vision however lumen was too narrow to permit this. Scope was removed, hybrid wire was cannulated by 5Fr and exchanged for super stiff wire. A 16 Fr [place] tip hyatt was then placed over the wire with immediate return of clear pink fluid. Balloon inflated with 10cc's of sterile water. Bryan syringe was used to irrigate catheter and placement was confirmed. Catheter was connected to sterile tubing and collection bag and positioned to gravity drainage. Patient was left in the care of primary surgical team.    Plan:  -Maintain catheter 7 days for urethral healing  -After 7d, trial of void may be attempted per primary  -Patient with appropriate kristen-operative abx coverage for urologic instrumentation  -Trend Cr/IO  -Urology will follow  -Please page with questions or concerns      Poonam Friedman MD  PGY4 Urology  [id number]           [1]   Family History  Problem Relation Name Age of Onset    COPD Mother      Diabetes Mother      Hypertension Mother      Kidney failure Father      Kidney disease Father          stage IV

## 2025-04-19 NOTE — ANESTHESIA POSTPROCEDURE EVALUATION
Patient: Tanmay Mcneill    Procedure Summary       Date: 04/19/25 Room / Location: [facility] OR 17 / Virtual [facility] OR    Anesthesia Start: 0928 Anesthesia Stop: 1509    Procedure: TRANSPLANT, KIDNEY (Right: Abdomen) Diagnosis:       ESRD (end stage renal disease) (Multi)      (ESRD (end stage renal disease) (Multi) [N18.6])    Surgeons: Doris Ontiveros MD Responsible Provider: Bipin Carroll MD    Anesthesia Type: general ASA Status: 3            Anesthesia Type: general    Vitals Value Taken Time   /54 04/19/25 15:02   Temp 36.5 04/19/25 15:10   Pulse 86 04/19/25 15:07   Resp 0 04/19/25 15:07   SpO2 97 % 04/19/25 15:07   Vitals shown include unfiled device data.    Anesthesia Post Evaluation    Patient location during evaluation: PACU  Patient participation: complete - patient participated  Level of consciousness: awake and alert  Pain management: adequate  Airway patency: patent  Cardiovascular status: acceptable  Respiratory status: acceptable  Hydration status: acceptable  Postoperative Nausea and Vomiting: none        There were no known notable events for this encounter.

## 2025-04-19 NOTE — CARE PLAN
The patient's goals for the shift include      The clinical goals for the shift include patient will remain HDS during shift

## 2025-04-19 NOTE — TELEPHONE ENCOUNTER
UNOS ID: [id number]  Match ID: [id number]  Organ: right/left kidney   KDPI: 48.0  Known risk status: None (meets criteria within 30 days)  Local vs Import: LOCAL  HCV: Ab: Anti-HCV: Negative; MELISSA: HCV MELISSA: Negative  HepBcAb: Anti-HBc: Negative    Confirm the following:  Any COVID symptoms (nausea, vomiting, diarrhea, fever, chills, cough, sore throat, headache): YES-DESCRIBE/NO: No  Any contact with anyone positive for or suspected to have COVID: YES-DESCRIBE/NO: No  Any recent hospitalizations: YES-DESCRIBE/NO: No  Any recent illnesses: YES-DESCRIBE/NO: No  Any recent injuries (cracked teeth, broken bones, wounds that won’t heal): YES-DESCRIBE/NO: No  Any antibiotics: YES-DESCRIBE/NO: No  Any blood thinners: YES-DESCRIBE/NO: No  Any blood transfusions: YES-DESCRIBE/NO: No  When was last dialysis/type: YES/NA/NO: Yes 25    The last time they ate or drank anythin25-  Ask the surgeon whether or not the patient should be made NPO at this time.  It is not necessary for the patient to bring anything with them other than a current medication list and insurance information  Determine ETA to hospital:   Patient aware of the possibility of a dry-run.

## 2025-04-19 NOTE — SIGNIFICANT EVENT
POST OP CHECK  S:    POD 0 from kidney transplant. Patient doing well postoperatively without acute concerns. Denies chest pain, shortness of breath, fevers, chills, nausea, vomiting. Endorses pain well controlled. UOP has been low, given next dose of lasix early per Dr. Ontiveros.     O:   Visit Vitals  /53   Pulse 84   Temp 36.5 °C (97.7 °F)   Resp 14        PHYSICAL EXAM  Constitutional: no acute distress  Skin: warm and dry  Neuro: alert and conversant  Cardiac: non-cyanotic, non-tachycardic on monitor  Pulmonary: non-labored breathing  Abdomen: soft, non-distended, appropriately tender to palpation   : hyatt in place, low UOP  Surgical Site: island dressing covering incision, minimal strikethrough on dressing. BRETT drains sanguinous x2 with low output holding suction    A/P:  - follow up 4h VBG  - follow up repeat RFP  - continue to monitor UOP    Neha Edouard MD  PGY-1   Transplant Surgery  Service Pager: [phone]

## 2025-04-19 NOTE — H&P
Transplant Surgery History and Physical    Subjective   Chief Complaint/Reason for Admission: DDKT    HPI:  Tanmay Mcneill is a 39 y.o. year old male with a PMHx significant for ESRD 2/2 T2DM, as well as HTN, asthma, arthritis who presents for possible  donor kidney transplant. Patient is on HD MWF since , and has AVF for access. Last HD session . Patient makes minimal urine each day. Dry weight is unknown kg. No pertinent surgical history. Past medical history incudes HTN, asthma, arthritis.  Denies any recent sick contacts, as well as fevers/chills, headache, dizziness, chest pain, cough, shortness of breath, nausea/vomiting, constipation/diarrhea, abdominal pain, weakness.    Kidney Info:  Etiology: DCD  No increased risk  ABO: O  PRA: 19 %  CMV: Pending/Negative  EBV: Pending/Positive  Toxo: Pending/Negative  HCV Ab/MELISSA: Pending/Negative  HBcAb: Pending/Negative  ABsAg/HBV MELISSA: Pending/Negative    Recent imaging includes:  - ECHO on 2023, which revealed an EF of 60-65  - Stress Test on 2023, which revealed some suggestion of prior infact  - CT Abd/Pelvis on , which revealed nothing pertinent    A 12-point ROS was performed and was unremarkable except as above.    PMH:  Medical History[1]  PSH:  Surgical History[2]  Soc Hx:  Social History     Socioeconomic History    Marital status: Single     Spouse name: Not on file    Number of children: Not on file    Years of education: Not on file    Highest education level: Not on file   Occupational History    Not on file   Tobacco Use    Smoking status: Never     Passive exposure: Never    Smokeless tobacco: Never   Vaping Use    Vaping status: Never Used   Substance and Sexual Activity    Alcohol use: Never    Drug use: Never    Sexual activity: Not on file   Other Topics Concern    Not on file   Social History Narrative    Not on file     Social Drivers of Health     Financial Resource Strain: Low Risk  (2025)    Overall Financial  Resource Strain (CARDIA)     Difficulty of Paying Living Expenses: Not hard at all   Food Insecurity: No Food Insecurity (4/18/2025)    Hunger Vital Sign     Worried About Running Out of Food in the Last Year: Never true     Ran Out of Food in the Last Year: Never true   Transportation Needs: No Transportation Needs (4/18/2025)    PRAPARE - Transportation     Lack of Transportation (Medical): No     Lack of Transportation (Non-Medical): No   Physical Activity: Not on file   Stress: Not on file   Social Connections: Not on file   Intimate Partner Violence: Not At Risk (4/18/2025)    Humiliation, Afraid, Rape, and Kick questionnaire     Fear of Current or Ex-Partner: No     Emotionally Abused: No     Physically Abused: No     Sexually Abused: No   Housing Stability: Low Risk  (4/18/2025)    Housing Stability Vital Sign     Unable to Pay for Housing in the Last Year: No     Number of Times Moved in the Last Year: 1     Homeless in the Last Year: No     Fam Hx:  Family History[3]   Allergies:  RX Allergies[4]  Current Medications:  Medications Ordered Prior to Encounter[5]      Objective   Vitals:  Visit Vitals  /85 (BP Location: Left arm, Patient Position: Lying)   Pulse 70   Temp 36.4 °C (97.5 °F) (Temporal)   Resp 18       Physical Exam:  Gen: NAD  Card: RR  Pulm: nml work of breathing  MSK: Fistula in RUE, no lower extremity edema    Labs:  Results for orders placed or performed during the hospital encounter of 04/18/25 (from the past 24 hours)   POCT GLUCOSE   Result Value Ref Range    POCT Glucose 189 (H) 74 - 99 mg/dL   HIV 1/2 Antigen/Antibody Screen with Reflex to Confirmation   Result Value Ref Range    HIV 1/2 Antigen/Antibody Screen with Reflex to Confirmation Nonreactive Nonreactive   Type and screen   Result Value Ref Range    ABO TYPE O     Rh TYPE POS     ANTIBODY SCREEN NEG    Blood Gas Venous Full Panel   Result Value Ref Range    POCT pH, Venous 7.43 7.33 - 7.43 pH    POCT pCO2, Venous 43 41 -  51 mm Hg    POCT pO2, Venous 56 (H) 35 - 45 mm Hg    POCT SO2, Venous 91 (H) 45 - 75 %    POCT Oxy Hemoglobin, Venous 88.4 (H) 45.0 - 75.0 %    POCT Hematocrit Calculated, Venous 33.0 (L) 41.0 - 52.0 %    POCT Sodium, Venous 134 (L) 136 - 145 mmol/L    POCT Potassium, Venous 3.8 3.5 - 5.3 mmol/L    POCT Chloride, Venous 99 98 - 107 mmol/L    POCT Ionized Calicum, Venous 1.14 1.10 - 1.33 mmol/L    POCT Glucose, Venous 196 (H) 74 - 99 mg/dL    POCT Lactate, Venous 1.9 0.4 - 2.0 mmol/L    POCT Base Excess, Venous 3.7 (H) -2.0 - 3.0 mmol/L    POCT HCO3 Calculated, Venous 28.5 (H) 22.0 - 26.0 mmol/L    POCT Hemoglobin, Venous 10.9 (L) 13.5 - 17.5 g/dL    POCT Anion Gap, Venous 10.0 10.0 - 25.0 mmol/L    Patient Temperature 37.0 degrees Celsius    FiO2 21 %   Coagulation Screen   Result Value Ref Range    Protime 11.4 9.8 - 12.4 seconds    INR 1.0 0.9 - 1.1    aPTT 30 26 - 36 seconds   Cytomegalovirus IgG   Result Value Ref Range    Cytomegalovirus IgG Reactive (A) Nonreactive   Hepatic Function Panel   Result Value Ref Range    Albumin 4.0 3.4 - 5.0 g/dL    Bilirubin, Total 0.4 0.0 - 1.2 mg/dL    Bilirubin, Direct 0.1 0.0 - 0.3 mg/dL    Alkaline Phosphatase 88 33 - 120 U/L    ALT 15 10 - 52 U/L    AST 14 9 - 39 U/L    Total Protein 7.2 6.4 - 8.2 g/dL   Hepatitis C Antibody   Result Value Ref Range    Hepatitis C AB Nonreactive Nonreactive   Hepatitis B Core Antibody, Total   Result Value Ref Range    Hepatitis B Core AB- Total Nonreactive Nonreactive   Hepatitis B Surface Antibody   Result Value Ref Range    Hepatitis B Surface .6 (H) <10.0 mIU/mL   Hepatitis B Surface Antigen   Result Value Ref Range    Hepatitis B Surface AG Nonreactive Nonreactive   Phosphorus   Result Value Ref Range    Phosphorus 3.8 2.5 - 4.9 mg/dL   Basic Metabolic Panel   Result Value Ref Range    Glucose 187 (H) 74 - 99 mg/dL    Sodium 136 136 - 145 mmol/L    Potassium 3.7 3.5 - 5.3 mmol/L    Chloride 96 (L) 98 - 107 mmol/L    Bicarbonate  27 21 - 32 mmol/L    Anion Gap 17 10 - 20 mmol/L    Urea Nitrogen 18 6 - 23 mg/dL    Creatinine 8.62 (H) 0.50 - 1.30 mg/dL    eGFR 7 (L) >60 mL/min/1.73m*2    Calcium 9.2 8.6 - 10.6 mg/dL   POCT GLUCOSE   Result Value Ref Range    POCT Glucose 173 (H) 74 - 99 mg/dL        Assessment   ASSESSMENT  Tanmay Mcneill is a 39 y.o. male with hx ESRD 2/2 to T2DM who presented to [facility] as a direct admission for possible RIGHT DDKT. Patient is in fair condition. Plan for OR today for kidney transplant.    PLAN:  - To OR today for DDKT with Dr. Ontiveros.   - Consent to be obtained prior.  - he is NPO now. NS @ 75cc/hr while NPO      Svitlana Soto MD  PGY-1 General Surgery  Transplant Surgery [id number]         [1]   Past Medical History:  Diagnosis Date    Diabetes mellitus (Multi)     Hypertension    [2]   Past Surgical History:  Procedure Laterality Date    OTHER SURGICAL HISTORY  12/18/2017    History Of Prior Surgery    OTHER SURGICAL HISTORY  04/08/2020    Hip surgery   [3]   Family History  Problem Relation Name Age of Onset    COPD Mother      Diabetes Mother      Hypertension Mother      Kidney failure Father      Kidney disease Father          stage IV   [4] No Known Allergies  [5]   No current facility-administered medications on file prior to encounter.     Current Outpatient Medications on File Prior to Encounter   Medication Sig Dispense Refill    albuterol (Ventolin HFA) 90 mcg/actuation inhaler Inhale 2 puffs every 4 hours if needed.      albuterol 0.63 mg/3 mL nebulizer solution Take 3 mL (0.63 mg) by nebulization every 6 hours if needed for wheezing or shortness of breath.      amLODIPine (Norvasc) 10 mg tablet Take 1 tablet (10 mg) by mouth once daily.      atorvastatin (Lipitor) 80 mg tablet Take 1 tablet (80 mg) by mouth once daily.      blood sugar diagnostic (Accu-Chek Celi Plus test strp) strip 1 strip 4 times a day.      carvedilol (Coreg) 25 mg tablet Take 1 tablet (25 mg) by mouth 2 times daily (morning  "and late afternoon).      hydrALAZINE (Apresoline) 100 mg tablet Take 1 tablet (100 mg) by mouth every 12 hours.      lancets (OneTouch UltraSoft Lancets) misc 1 Lancet 2 times a day.      losartan (Cozaar) 100 mg tablet Take 0.5 tablets (50 mg) by mouth once daily.      oxyCODONE (Oxy-IR) 5 mg immediate release capsule Take 1 capsule (5 mg) by mouth every 6 hours if needed.      pantoprazole (ProtoNix) 20 mg EC tablet Take 1 tablet (20 mg) by mouth twice a day.      pen needle, diabetic 31 gauge x 1/4\" needle       Semglee,insulin glarg-yfgn,Pen 100 unit/mL (3 mL) Pen       sodium bicarbonate 650 mg tablet Take 2 tablets (1,300 mg) by mouth 2 times a day.      zolpidem (Ambien) 5 mg tablet Take 0.5 tablets (2.5 mg) by mouth as needed at bedtime.      [DISCONTINUED] ergocalciferol (Vitamin D-2) 1.25 MG (11845 UT) capsule TAKE 1 CAPSULE BY MOUTH ONCE A WEEK FOR 16 DOSES      [DISCONTINUED] FUROSEMIDE ORAL Take by mouth twice a day.      [DISCONTINUED] Ozempic 0.25 mg or 0.5 mg (2 mg/3 mL) pen injector Inject 0.25 mg under the skin 1 (one) time per week.      [DISCONTINUED] sucroferric oxyhydroxide (Velphoro) 500 mg tablet,chewable chewable tablet Chew 1 tablet (500 mg) 3 times a day.       "

## 2025-04-19 NOTE — BRIEF OP NOTE
Date: 2025  OR Location: [facility] OR    Name: Tanmay Mcneill : 1985, Age: 39 y.o., MRN: [id number], Sex: male    Diagnosis  Pre-op Diagnosis      * ESRD (end stage renal disease) (Multi) [N18.6] Post-op Diagnosis     * ESRD (end stage renal disease) (Multi) [N18.6]     Procedures  TRANSPLANT, KIDNEY  92516 - ND RENAL ALTRNSPLJ IMPLTJ GRF W/O RCP NEPHRECTOMY      Surgeons      * Doris Ontiveros - Primary    Resident/Fellow/Other Assistant:  Surgeons and Role:  * No surgeons found with a matching role *    Staff:   Circulator: Earl  Scrub Person: Sonia Garcia Circulator: Dickson Garcia Scrub: Nereida    Anesthesia Staff: Anesthesiologist: Bipin Carroll MD  Anesthesia Resident: Bipin Hughes MD; Gabrielle Andrews DO    Procedure Summary  Anesthesia: General  ASA: III  Estimated Blood Loss: 150mL  Intra-op Medications:   Administrations occurring from 0830 to 1355 on 25:   Medication Name Total Dose   heparin (porcine) 25,000 Units in sodium chloride 0.9% 250 mL (100 Units/mL) infusion Cannot be calculated   acetaminophen (Tylenol) tablet 975 mg   albumin human bottle 5% 500 mL   albuterol nebulizer solution 2.5 mg/3 mL (0.083%) 2.5 mg   anti-thymocyte globulin rabbit (Thymoglobulin) 200 mg, hydrocortisone sodium succinate (PF) (Solu-CORTEF) 20 mg, heparin 1,000 Units in sodium chloride 0.9% 500 mL  mg   ceFAZolin (Ancef) vial 1 g 3 g   dexAMETHasone (Decadron) injection 4 mg/mL 4 mg   fentaNYL (Sublimaze) injection 50 mcg/mL 100 mcg   furosemide (Lasix) 10 mg/mL 100 mg   LR bolus Cannot be calculated   lidocaine (Xylocaine) injection 2 % 100 mg   mannitol 20% 25 g   phenylephrine 40 mcg/mL syringe 10 mL 200 mcg   propofol (Diprivan) injection 10 mg/mL 200 mg   rocuronium (ZeMuron) 50 mg/5 mL injection 140 mg   albuterol 2.5 mg /3 mL (0.083 %) nebulizer solution 2.5 mg 2.5 mg   methylPREDNISolone sodium succinate (SOLU-Medrol) 500 mg in dextrose 5% 100 mL  mg   acetaminophen (Tylenol)  tablet 975 mg 975 mg              Anesthesia Record               Intraprocedure I/O Totals          Intake    anti-thymocyte globulin rabbit (Thymoglobulin) 200 mg, hydrocortisone sodium succinate (PF) (Solu-CORTEF) 20 mg, heparin 1,000 Units in sodium chloride 0.9% 500 mL .40 mL    methylPREDNISolone sodium succinate (SOLU-Medrol) 500 mg in dextrose 5% 100 mL .00 mL    Total Intake 649.4 mL          Specimen: No specimens collected               Findings: Right transplanted kidney placed. 2 Temo drains left in place.     Complications:  None; patient tolerated the procedure well.     Disposition: PACU - hemodynamically stable.  Condition: stable  Specimens Collected: No specimens collected  Attending Attestation: I was present and scrubbed for the entire procedure.    Doris Ontiveros  Phone Number: [phone]

## 2025-04-19 NOTE — ANESTHESIA PREPROCEDURE EVALUATION
Patient: Tanmay Mcneill    Procedure Information       Date/Time: 04/19/25 0830    Procedure: TRANSPLANT, KIDNEY (Right: Abdomen)    Location: [facility] OR 17 / [facility] OR    Surgeons: Doris Ontiveros MD            Relevant Problems   Anesthesia (within normal limits)      Cardiac   (+) Hypertension      Pulmonary   (+) Asthma      Neuro (within normal limits)      GI   (+) Acid reflux      /Renal   (+) ESRD (end stage renal disease) (Multi)      Liver (within normal limits)      Endocrine   (+) Morbid obesity (Multi)   (+) Type 2 diabetes mellitus      Musculoskeletal   (+) Chronic pain of right knee   (+) Osteoarthritis      Respiratory   (+) Snoring      Circulatory   (+) Arteriovenous graft for hemodialysis in place, primary (RUE)      Genitourinary   (+) CKD (chronic kidney disease), stage V (Multi)       Clinical information reviewed:    Allergies                NPO Detail:  No data recorded    Vitals:    04/19/25 0400   BP: 160/70   Pulse: 74   Resp: 18   Temp: 36.8 °C (98.2 °F)   SpO2: 98%        Chemistry    Lab Results   Component Value Date/Time     04/19/2025 0133    K 3.7 04/19/2025 0133    CL 96 (L) 04/19/2025 0133    CO2 27 04/19/2025 0133    BUN 18 04/19/2025 0133    CREATININE 8.62 (H) 04/19/2025 0133    Lab Results   Component Value Date/Time    CALCIUM 9.2 04/19/2025 0133    ALKPHOS 88 04/19/2025 0133    AST 14 04/19/2025 0133    ALT 15 04/19/2025 0133    BILITOT 0.4 04/19/2025 0133          Lab Results   Component Value Date/Time    WBC 7.4 04/02/2024 1436    HGB 12.1 (L) 04/02/2024 1436    HCT 39.5 (L) 04/02/2024 1436     04/02/2024 1436     Lab Results   Component Value Date/Time    PROTIME 11.4 04/19/2025 0133    INR 1.0 04/19/2025 0133     No results found for this or any previous visit (from the past 4464 hours).    TTE Echo (8/1/2023)  PHYSICIAN INTERPRETATION:  Left Ventricle: The left ventricular systolic function is normal, with an estimated ejection fraction of  60-65%. There are no regional wall motion abnormalities. The left ventricular cavity size is normal. There is mildly increased left ventricular posterior wall thickness. There is mild eccentric left ventricular hypertrophy. Spectral Doppler shows a pseudonormal pattern of left ventricular diastolic filling.  Left Atrium: The left atrium is normal in size.  Right Ventricle: The right ventricle is normal in size. There is normal right ventricular global systolic function.  Right Atrium: The right atrium is normal in size.  Aortic Valve: The aortic valve is trileaflet. There are increased aortic valve velocities due to increased flow/dynamic ejection. There is no evidence of aortic valve regurgitation. The peak instantaneous gradient of the aortic valve is 8.9 mmHg. The mean gradient of the aortic valve is 4.0 mmHg.  Mitral Valve: The mitral valve is normal in structure. There is trace mitral valve regurgitation.  Tricuspid Valve: The tricuspid valve is structurally normal. There is mild tricuspid regurgitation. The Doppler estimated RVSP is mildly elevated at 33.9 mmHg. The maximum TR velocity (and hence, PA systolic pressure) is likely underestimated.  Pulmonic Valve: The pulmonic valve is structurally normal. There is trace pulmonic valve regurgitation.  Pericardium: There is a trivial pericardial effusion.  Aorta: The aortic root is normal.  Systemic Veins: The inferior vena cava was not well visualized.  In comparison to the previous echocardiogram(s): Compared with study from 7/15/2022, no significant change.        CONCLUSIONS:  1. Left ventricular systolic function is normal with a 60-65% estimated ejection fraction.  2. Spectral Doppler shows a pseudonormal pattern of left ventricular diastolic filling.  3. There is mild eccentric left ventricular hypertrophy.  4. Mildly elevated RVSP.  5. The maximum TR velocity (and hence, PA systolic pressure) is likely underestimated.      Nuclear Stress Test  (7/20/2023)  IMPRESSION:  Questionable mild fixed perfusion defect involving the mid to basal  lateral wall suggestive of prior infarct. Additional mild fixed  perfusion defect involving the inferior wall may be related to bowel  artifact. Prior infarct cannot be excluded.     The left ventricle is moderately dilated with EDV of 165 mL,  previously 226 mL.     Normal LV wall motion with an LV EF estimated a post-stress 52%       Physical Exam    Airway  Mallampati: III  TM distance: >3 FB  Neck ROM: full  Mouth opening: 3 or more finger widths     Cardiovascular   Rhythm: regular  Rate: normal     Dental - normal exam     Pulmonary (+) decreased breath sounds  Comments: Non labored respirations    Abdominal (+) obese  Abdomen: soft             Anesthesia Plan    History of general anesthesia?: yes  History of complications of general anesthesia?: no    ASA 3     general     The patient is not a current smoker. (quit smoking 2017)  Patient did not smoke on day of procedure.    intravenous induction   Postoperative pain plan includes opioids.  Trial extubation is planned.  Anesthetic plan and risks discussed with patient.  Use of blood products discussed with patient who consented to blood products.    Plan discussed with resident.

## 2025-04-19 NOTE — TELEPHONE ENCOUNTER
ON CALL:  Received call from Samantha Ritter; pt being admitted for kidney transplant with Dr Ontiveros. OR time 0800. Admit order placed.

## 2025-04-19 NOTE — CARE PLAN
Pt not yet seen or examined by endocrine provider. Preliminary insulin regimen developed via chart review with full consult to be completed by this provider tomorrow AM. Please follow insulin regimen below until consult is completed on 4/20/25.     PLAN  Steroids:  methylpred 500mg intra-op  methylpred 250 mg x1d  methylpred 125mg x1d  methylpred 60mg x1d  pred 20mg ongoing    Nutrition: NPO    - start NPH 20u with each solumedrol dose - give first dose STAT this afternoon when pt is out of OR, then continue qAM with AM solumedrol tomorrow    - start lispro corrective scale #2 q4h, adjust to scale #3 q4hr if persistently hyperglycemic >250mg/dL    = 0u  151-200 = 2u  201-250 = 4u  251-300 = 6u  301-350 = 8u  351-400 = 10u    -Accuchecks q4hr   - Goal BG <200  -Hypoglycemia protocol  -Will continue to follow and titrate insulin accordingly    Disha Maynard PA-C   Endocrinology  Inpatient Diabetes Management Team

## 2025-04-19 NOTE — H&P
Transplant Surgery History and Physical    Subjective   Chief Complaint/Reason for Admission: DDKT    HPI:  Tanmay Mcneill is a 39 y.o. year old male with a PMHx significant for ESRD 2/2 T2DM, as well as HTN, asthma, arthritis who presents for possible  donor kidney transplant. Patient is on HD MWF since , and has AVF for access. Last HD session . Patient makes minimal urine each day. Dry weight is unknown kg. No pertinent surgical history. Past medical history incudes HTN, asthma, arthritis.  Denies any recent sick contacts, as well as fevers/chills, headache, dizziness, chest pain, cough, shortness of breath, nausea/vomiting, constipation/diarrhea, abdominal pain, weakness.    Kidney Info:  Etiology: DCD  No increased risk  ABO: O  PRA: 19 %  CMV: Pending/Negative  EBV: Pending/Positive  Toxo: Pending/Negative  HCV Ab/MELISSA: Pending/Negative  HBcAb: Pending/Negative  ABsAg/HBV MELISSA: Pending/Negative    Recent imaging includes:  - ECHO on 2023, which revealed an EF of 60-65  - Stress Test on 2023, which revealed some suggestion of prior infact  - CT Abd/Pelvis on , which revealed nothing pertinent    A 12-point ROS was performed and was unremarkable except as above.    PMH:  Medical History[1]  PSH:  Surgical History[2]  Soc Hx:  Social History     Socioeconomic History    Marital status: Single     Spouse name: Not on file    Number of children: Not on file    Years of education: Not on file    Highest education level: Not on file   Occupational History    Not on file   Tobacco Use    Smoking status: Never     Passive exposure: Never    Smokeless tobacco: Never   Vaping Use    Vaping status: Never Used   Substance and Sexual Activity    Alcohol use: Never    Drug use: Never    Sexual activity: Not on file   Other Topics Concern    Not on file   Social History Narrative    Not on file     Social Drivers of Health     Financial Resource Strain: Low Risk  (2025)    Overall Financial  Resource Strain (CARDIA)     Difficulty of Paying Living Expenses: Not hard at all   Food Insecurity: No Food Insecurity (4/18/2025)    Hunger Vital Sign     Worried About Running Out of Food in the Last Year: Never true     Ran Out of Food in the Last Year: Never true   Transportation Needs: No Transportation Needs (4/18/2025)    PRAPARE - Transportation     Lack of Transportation (Medical): No     Lack of Transportation (Non-Medical): No   Physical Activity: Not on file   Stress: Not on file   Social Connections: Not on file   Intimate Partner Violence: Not At Risk (4/18/2025)    Humiliation, Afraid, Rape, and Kick questionnaire     Fear of Current or Ex-Partner: No     Emotionally Abused: No     Physically Abused: No     Sexually Abused: No   Housing Stability: Low Risk  (4/18/2025)    Housing Stability Vital Sign     Unable to Pay for Housing in the Last Year: No     Number of Times Moved in the Last Year: 1     Homeless in the Last Year: No     Fam Hx:  Family History[3]   Allergies:  RX Allergies[4]  Current Medications:  Medications Ordered Prior to Encounter[5]      Objective   Vitals:  Visit Vitals  /69   Pulse 77   Temp 36.9 °C (98.4 °F) (Temporal)   Resp 18       Physical Exam:  Gen: NAD  Card: RR  Pulm: nml work of breathing  MSK: Fistula in RUE, no lower extremity edema    Labs:  Results for orders placed or performed during the hospital encounter of 04/18/25 (from the past 24 hours)   POCT GLUCOSE   Result Value Ref Range    POCT Glucose 189 (H) 74 - 99 mg/dL        Assessment   ASSESSMENT  Tanmay Mcneill is a 39 y.o. male with hx ESRD 2/2 to T2DM who presented to [facility] as a direct admission for possible RIGHT DDKT. Patient is in fair condition. Plan for OR for kidney transplant 4/19.    PLAN:  - To OR 4/19 for DDKT with Dr. Ontiveros. Consent to be obtained prior.  - NPO now. NS @ 75cc/hr while NPO  - Ordered STAT basic labs, serologies, infectious workup including CXR      Eleno Cabrera MD  PGY-1  "General Surgery  Transplant Surgery [phone]         [1]   Past Medical History:  Diagnosis Date    Diabetes mellitus (Multi)     Hypertension    [2]   Past Surgical History:  Procedure Laterality Date    OTHER SURGICAL HISTORY  12/18/2017    History Of Prior Surgery    OTHER SURGICAL HISTORY  04/08/2020    Hip surgery   [3]   Family History  Problem Relation Name Age of Onset    COPD Mother      Diabetes Mother      Hypertension Mother      Kidney failure Father      Kidney disease Father          stage IV   [4] No Known Allergies  [5]   No current facility-administered medications on file prior to encounter.     Current Outpatient Medications on File Prior to Encounter   Medication Sig Dispense Refill    albuterol (Ventolin HFA) 90 mcg/actuation inhaler Inhale 2 puffs every 4 hours if needed.      albuterol 0.63 mg/3 mL nebulizer solution Take 3 mL (0.63 mg) by nebulization every 6 hours if needed for wheezing or shortness of breath.      amLODIPine (Norvasc) 10 mg tablet Take 1 tablet (10 mg) by mouth once daily.      atorvastatin (Lipitor) 80 mg tablet Take 1 tablet (80 mg) by mouth once daily.      blood sugar diagnostic (Accu-Chek Celi Plus test strp) strip 1 strip 4 times a day.      carvedilol (Coreg) 25 mg tablet Take 1 tablet (25 mg) by mouth 2 times daily (morning and late afternoon).      hydrALAZINE (Apresoline) 100 mg tablet Take 1 tablet (100 mg) by mouth every 12 hours.      lancets (OneTouch UltraSoft Lancets) misc 1 Lancet 2 times a day.      losartan (Cozaar) 100 mg tablet Take 0.5 tablets (50 mg) by mouth once daily.      oxyCODONE (Oxy-IR) 5 mg immediate release capsule Take 1 capsule (5 mg) by mouth every 6 hours if needed.      pantoprazole (ProtoNix) 20 mg EC tablet Take 1 tablet (20 mg) by mouth twice a day.      pen needle, diabetic 31 gauge x 1/4\" needle       Semglee,insulin glarg-yfgn,Pen 100 unit/mL (3 mL) Pen       sodium bicarbonate 650 mg tablet Take 2 tablets (1,300 mg) by mouth 2 " times a day.      zolpidem (Ambien) 5 mg tablet Take 0.5 tablets (2.5 mg) by mouth as needed at bedtime.      [DISCONTINUED] ergocalciferol (Vitamin D-2) 1.25 MG (00021 UT) capsule TAKE 1 CAPSULE BY MOUTH ONCE A WEEK FOR 16 DOSES      [DISCONTINUED] FUROSEMIDE ORAL Take by mouth twice a day.      [DISCONTINUED] Ozempic 0.25 mg or 0.5 mg (2 mg/3 mL) pen injector Inject 0.25 mg under the skin 1 (one) time per week.      [DISCONTINUED] sucroferric oxyhydroxide (Velphoro) 500 mg tablet,chewable chewable tablet Chew 1 tablet (500 mg) 3 times a day.

## 2025-04-20 VITALS
BODY MASS INDEX: 41.75 KG/M2 | SYSTOLIC BLOOD PRESSURE: 136 MMHG | DIASTOLIC BLOOD PRESSURE: 68 MMHG | HEART RATE: 91 BPM | OXYGEN SATURATION: 95 % | TEMPERATURE: 97 F | RESPIRATION RATE: 17 BRPM | WEIGHT: 315 LBS | HEIGHT: 73 IN

## 2025-04-20 PROBLEM — E11.65 TYPE 2 DIABETES MELLITUS WITH HYPERGLYCEMIA, WITH LONG-TERM CURRENT USE OF INSULIN: Chronic | Status: ACTIVE | Noted: 2025-04-20

## 2025-04-20 PROBLEM — R73.9 STEROID-INDUCED HYPERGLYCEMIA: Status: ACTIVE | Noted: 2025-04-20

## 2025-04-20 PROBLEM — Z79.4 TYPE 2 DIABETES MELLITUS WITH HYPERGLYCEMIA, WITH LONG-TERM CURRENT USE OF INSULIN: Chronic | Status: ACTIVE | Noted: 2025-04-20

## 2025-04-20 PROBLEM — T38.0X5A STEROID-INDUCED HYPERGLYCEMIA: Status: ACTIVE | Noted: 2025-04-20

## 2025-04-20 LAB
25(OH)D3 SERPL-MCNC: 22 NG/ML (ref 30–100)
ALBUMIN SERPL BCP-MCNC: 3.8 G/DL (ref 3.4–5)
ALBUMIN SERPL BCP-MCNC: 3.8 G/DL (ref 3.4–5)
ANION GAP SERPL CALC-SCNC: 19 MMOL/L (ref 10–20)
ANION GAP SERPL CALC-SCNC: 19 MMOL/L (ref 10–20)
ANION GAP SERPL CALC-SCNC: 21 MMOL/L (ref 10–20)
BUN SERPL-MCNC: 38 MG/DL (ref 6–23)
BUN SERPL-MCNC: 43 MG/DL (ref 6–23)
BUN SERPL-MCNC: 54 MG/DL (ref 6–23)
CALCIUM SERPL-MCNC: 8.3 MG/DL (ref 8.6–10.6)
CALCIUM SERPL-MCNC: 8.3 MG/DL (ref 8.6–10.6)
CALCIUM SERPL-MCNC: 8.7 MG/DL (ref 8.6–10.6)
CHLORIDE SERPL-SCNC: 92 MMOL/L (ref 98–107)
CHLORIDE SERPL-SCNC: 96 MMOL/L (ref 98–107)
CHLORIDE SERPL-SCNC: 96 MMOL/L (ref 98–107)
CO2 SERPL-SCNC: 21 MMOL/L (ref 21–32)
CO2 SERPL-SCNC: 22 MMOL/L (ref 21–32)
CO2 SERPL-SCNC: 23 MMOL/L (ref 21–32)
CREAT SERPL-MCNC: 10.46 MG/DL (ref 0.5–1.3)
CREAT SERPL-MCNC: 10.95 MG/DL (ref 0.5–1.3)
CREAT SERPL-MCNC: 12.34 MG/DL (ref 0.5–1.3)
EGFRCR SERPLBLD CKD-EPI 2021: 5 ML/MIN/1.73M*2
EGFRCR SERPLBLD CKD-EPI 2021: 5 ML/MIN/1.73M*2
EGFRCR SERPLBLD CKD-EPI 2021: 6 ML/MIN/1.73M*2
ERYTHROCYTE [DISTWIDTH] IN BLOOD BY AUTOMATED COUNT: 17.3 % (ref 11.5–14.5)
GLUCOSE BLD MANUAL STRIP-MCNC: 186 MG/DL (ref 74–99)
GLUCOSE BLD MANUAL STRIP-MCNC: 197 MG/DL (ref 74–99)
GLUCOSE BLD MANUAL STRIP-MCNC: 224 MG/DL (ref 74–99)
GLUCOSE BLD MANUAL STRIP-MCNC: 229 MG/DL (ref 74–99)
GLUCOSE BLD MANUAL STRIP-MCNC: 252 MG/DL (ref 74–99)
GLUCOSE SERPL-MCNC: 172 MG/DL (ref 74–99)
GLUCOSE SERPL-MCNC: 197 MG/DL (ref 74–99)
GLUCOSE SERPL-MCNC: 201 MG/DL (ref 74–99)
HCT VFR BLD AUTO: 33 % (ref 41–52)
HCV RNA SERPL NAA+PROBE-ACNC: NOT DETECTED K[IU]/ML
HCV RNA SERPL NAA+PROBE-LOG IU: NORMAL {LOG_IU}/ML
HGB BLD-MCNC: 9.8 G/DL (ref 13.5–17.5)
MAGNESIUM SERPL-MCNC: 1.67 MG/DL (ref 1.6–2.4)
MCH RBC QN AUTO: 24.3 PG (ref 26–34)
MCHC RBC AUTO-ENTMCNC: 29.7 G/DL (ref 32–36)
MCV RBC AUTO: 82 FL (ref 80–100)
NRBC BLD-RTO: 0 /100 WBCS (ref 0–0)
PHOSPHATE SERPL-MCNC: 5.1 MG/DL (ref 2.5–4.9)
PHOSPHATE SERPL-MCNC: 6.1 MG/DL (ref 2.5–4.9)
PLATELET # BLD AUTO: 134 X10*3/UL (ref 150–450)
POTASSIUM SERPL-SCNC: 5 MMOL/L (ref 3.5–5.3)
POTASSIUM SERPL-SCNC: 5.3 MMOL/L (ref 3.5–5.3)
POTASSIUM SERPL-SCNC: 5.5 MMOL/L (ref 3.5–5.3)
RBC # BLD AUTO: 4.04 X10*6/UL (ref 4.5–5.9)
SODIUM SERPL-SCNC: 130 MMOL/L (ref 136–145)
SODIUM SERPL-SCNC: 131 MMOL/L (ref 136–145)
SODIUM SERPL-SCNC: 132 MMOL/L (ref 136–145)
WBC # BLD AUTO: 14.1 X10*3/UL (ref 4.4–11.3)

## 2025-04-20 PROCEDURE — 36415 COLL VENOUS BLD VENIPUNCTURE: CPT

## 2025-04-20 PROCEDURE — 2500000002 HC RX 250 W HCPCS SELF ADMINISTERED DRUGS (ALT 637 FOR MEDICARE OP, ALT 636 FOR OP/ED)

## 2025-04-20 PROCEDURE — 80048 BASIC METABOLIC PNL TOTAL CA: CPT | Mod: CCI | Performed by: STUDENT IN AN ORGANIZED HEALTH CARE EDUCATION/TRAINING PROGRAM

## 2025-04-20 PROCEDURE — 83735 ASSAY OF MAGNESIUM: CPT | Performed by: STUDENT IN AN ORGANIZED HEALTH CARE EDUCATION/TRAINING PROGRAM

## 2025-04-20 PROCEDURE — RXMED WILLOW AMBULATORY MEDICATION CHARGE

## 2025-04-20 PROCEDURE — 2500000004 HC RX 250 GENERAL PHARMACY W/ HCPCS (ALT 636 FOR OP/ED)

## 2025-04-20 PROCEDURE — 80069 RENAL FUNCTION PANEL: CPT

## 2025-04-20 PROCEDURE — 82947 ASSAY GLUCOSE BLOOD QUANT: CPT

## 2025-04-20 PROCEDURE — 85027 COMPLETE CBC AUTOMATED: CPT | Performed by: PHYSICIAN ASSISTANT

## 2025-04-20 PROCEDURE — 99215 OFFICE O/P EST HI 40 MIN: CPT | Performed by: HOSPITALIST

## 2025-04-20 PROCEDURE — 99223 1ST HOSP IP/OBS HIGH 75: CPT | Performed by: PHYSICIAN ASSISTANT

## 2025-04-20 PROCEDURE — 2500000001 HC RX 250 WO HCPCS SELF ADMINISTERED DRUGS (ALT 637 FOR MEDICARE OP): Performed by: PHYSICIAN ASSISTANT

## 2025-04-20 PROCEDURE — 2500000004 HC RX 250 GENERAL PHARMACY W/ HCPCS (ALT 636 FOR OP/ED): Performed by: STUDENT IN AN ORGANIZED HEALTH CARE EDUCATION/TRAINING PROGRAM

## 2025-04-20 PROCEDURE — 1200000002 HC GENERAL ROOM WITH TELEMETRY DAILY

## 2025-04-20 PROCEDURE — 99232 SBSQ HOSP IP/OBS MODERATE 35: CPT | Performed by: STUDENT IN AN ORGANIZED HEALTH CARE EDUCATION/TRAINING PROGRAM

## 2025-04-20 PROCEDURE — 2500000001 HC RX 250 WO HCPCS SELF ADMINISTERED DRUGS (ALT 637 FOR MEDICARE OP): Performed by: STUDENT IN AN ORGANIZED HEALTH CARE EDUCATION/TRAINING PROGRAM

## 2025-04-20 PROCEDURE — 5A1D70Z PERFORMANCE OF URINARY FILTRATION, INTERMITTENT, LESS THAN 6 HOURS PER DAY: ICD-10-PCS | Performed by: HOSPITALIST

## 2025-04-20 PROCEDURE — 2500000004 HC RX 250 GENERAL PHARMACY W/ HCPCS (ALT 636 FOR OP/ED): Mod: JZ,TB | Performed by: PHYSICIAN ASSISTANT

## 2025-04-20 PROCEDURE — 36415 COLL VENOUS BLD VENIPUNCTURE: CPT | Performed by: STUDENT IN AN ORGANIZED HEALTH CARE EDUCATION/TRAINING PROGRAM

## 2025-04-20 PROCEDURE — 80048 BASIC METABOLIC PNL TOTAL CA: CPT | Mod: CCI | Performed by: PHYSICIAN ASSISTANT

## 2025-04-20 PROCEDURE — 2500000002 HC RX 250 W HCPCS SELF ADMINISTERED DRUGS (ALT 637 FOR MEDICARE OP, ALT 636 FOR OP/ED): Performed by: PHYSICIAN ASSISTANT

## 2025-04-20 RX ORDER — TACROLIMUS 1 MG/1
3 CAPSULE ORAL 2 TIMES DAILY
Qty: 180 CAPSULE | Refills: 0 | Status: SHIPPED | OUTPATIENT
Start: 2025-04-20 | End: 2025-04-26

## 2025-04-20 RX ORDER — INSULIN LISPRO 100 [IU]/ML
0-10 INJECTION, SOLUTION INTRAVENOUS; SUBCUTANEOUS
Status: DISCONTINUED | OUTPATIENT
Start: 2025-04-20 | End: 2025-04-26 | Stop reason: HOSPADM

## 2025-04-20 RX ORDER — PANTOPRAZOLE SODIUM 40 MG/1
40 TABLET, DELAYED RELEASE ORAL
Qty: 30 TABLET | Refills: 0 | Status: SHIPPED | OUTPATIENT
Start: 2025-04-20 | End: 2025-05-23

## 2025-04-20 RX ORDER — DEXTROSE MONOHYDRATE 100 MG/ML
50 INJECTION, SOLUTION INTRAVENOUS CONTINUOUS
Status: DISCONTINUED | OUTPATIENT
Start: 2025-04-20 | End: 2025-04-20

## 2025-04-20 RX ORDER — PREDNISONE 5 MG/1
20 TABLET ORAL DAILY
Qty: 120 TABLET | Refills: 0 | Status: SHIPPED | OUTPATIENT
Start: 2025-04-23 | End: 2025-04-26 | Stop reason: SDUPTHER

## 2025-04-20 RX ORDER — CALCIUM GLUCONATE 20 MG/ML
2 INJECTION, SOLUTION INTRAVENOUS ONCE
Status: COMPLETED | OUTPATIENT
Start: 2025-04-20 | End: 2025-04-20

## 2025-04-20 RX ORDER — CLOTRIMAZOLE 10 MG/1
10 LOZENGE ORAL
Qty: 90 TROCHE | Refills: 0 | Status: SHIPPED | OUTPATIENT
Start: 2025-04-20 | End: 2025-04-26 | Stop reason: SDUPTHER

## 2025-04-20 RX ORDER — ACETAMINOPHEN 325 MG/1
650 TABLET ORAL ONCE
Status: COMPLETED | OUTPATIENT
Start: 2025-04-20 | End: 2025-04-20

## 2025-04-20 RX ORDER — DIPHENHYDRAMINE HCL 25 MG
25 CAPSULE ORAL ONCE
Status: COMPLETED | OUTPATIENT
Start: 2025-04-20 | End: 2025-04-20

## 2025-04-20 RX ORDER — VALGANCICLOVIR 450 MG/1
900 TABLET, FILM COATED ORAL DAILY
Qty: 60 TABLET | Refills: 0 | Status: SHIPPED | OUTPATIENT
Start: 2025-04-20 | End: 2025-04-22 | Stop reason: HOSPADM

## 2025-04-20 RX ORDER — ACETAMINOPHEN 325 MG/1
650 TABLET ORAL EVERY 6 HOURS PRN
Qty: 30 TABLET | Refills: 0 | Status: SHIPPED | OUTPATIENT
Start: 2025-04-20 | End: 2025-05-20

## 2025-04-20 RX ORDER — TACROLIMUS 0.5 MG/1
0.5 CAPSULE ORAL 2 TIMES DAILY
Qty: 60 CAPSULE | Refills: 11 | Status: SHIPPED | OUTPATIENT
Start: 2025-04-20 | End: 2025-04-26 | Stop reason: HOSPADM

## 2025-04-20 RX ORDER — MAGNESIUM SULFATE 1 G/100ML
1 INJECTION INTRAVENOUS ONCE
Status: COMPLETED | OUTPATIENT
Start: 2025-04-20 | End: 2025-04-20

## 2025-04-20 RX ORDER — SODIUM CHLORIDE 9 MG/ML
50 INJECTION, SOLUTION INTRAVENOUS CONTINUOUS
Status: DISCONTINUED | OUTPATIENT
Start: 2025-04-20 | End: 2025-04-21

## 2025-04-20 RX ORDER — DOCUSATE SODIUM 100 MG/1
100 CAPSULE, LIQUID FILLED ORAL 2 TIMES DAILY PRN
Qty: 60 CAPSULE | Refills: 0 | Status: SHIPPED | OUTPATIENT
Start: 2025-04-20

## 2025-04-20 RX ORDER — POLYETHYLENE GLYCOL 3350 17 G/17G
17 POWDER, FOR SOLUTION ORAL DAILY PRN
Qty: 1 PACKET | Refills: 0 | Status: SHIPPED | OUTPATIENT
Start: 2025-04-20

## 2025-04-20 RX ORDER — DEXTROSE 50 % IN WATER (D50W) INTRAVENOUS SYRINGE
25 ONCE
Status: DISCONTINUED | OUTPATIENT
Start: 2025-04-20 | End: 2025-04-20

## 2025-04-20 RX ORDER — OXYCODONE HYDROCHLORIDE 5 MG/1
10 TABLET ORAL EVERY 6 HOURS PRN
Refills: 0 | Status: DISCONTINUED | OUTPATIENT
Start: 2025-04-20 | End: 2025-04-22

## 2025-04-20 RX ORDER — MYCOPHENOLATE MOFETIL 250 MG/1
1000 CAPSULE ORAL
Qty: 240 CAPSULE | Refills: 0 | Status: SHIPPED | OUTPATIENT
Start: 2025-04-20 | End: 2025-04-26 | Stop reason: SDUPTHER

## 2025-04-20 RX ADMIN — CEFAZOLIN SODIUM 2 G: 2 INJECTION, SOLUTION INTRAVENOUS at 00:29

## 2025-04-20 RX ADMIN — SODIUM ZIRCONIUM CYCLOSILICATE 10 G: 10 POWDER, FOR SUSPENSION ORAL at 11:15

## 2025-04-20 RX ADMIN — FUROSEMIDE 80 MG: 10 INJECTION, SOLUTION INTRAVENOUS at 16:09

## 2025-04-20 RX ADMIN — PANTOPRAZOLE SODIUM 40 MG: 40 TABLET, DELAYED RELEASE ORAL at 06:40

## 2025-04-20 RX ADMIN — INSULIN LISPRO 4 UNITS: 100 INJECTION, SOLUTION INTRAVENOUS; SUBCUTANEOUS at 02:31

## 2025-04-20 RX ADMIN — FUROSEMIDE 80 MG: 10 INJECTION, SOLUTION INTRAVENOUS at 23:34

## 2025-04-20 RX ADMIN — CLOTRIMAZOLE 10 MG: 10 LOZENGE ORAL at 18:40

## 2025-04-20 RX ADMIN — CLOTRIMAZOLE 10 MG: 10 LOZENGE ORAL at 08:23

## 2025-04-20 RX ADMIN — OXYCODONE 5 MG: 5 TABLET ORAL at 23:38

## 2025-04-20 RX ADMIN — INSULIN LISPRO 2 UNITS: 100 INJECTION, SOLUTION INTRAVENOUS; SUBCUTANEOUS at 13:33

## 2025-04-20 RX ADMIN — INSULIN LISPRO 2 UNITS: 100 INJECTION, SOLUTION INTRAVENOUS; SUBCUTANEOUS at 07:02

## 2025-04-20 RX ADMIN — MYCOPHENOLATE MOFETIL 1000 MG: 250 CAPSULE ORAL at 18:32

## 2025-04-20 RX ADMIN — FUROSEMIDE 80 MG: 10 INJECTION, SOLUTION INTRAVENOUS at 01:57

## 2025-04-20 RX ADMIN — INSULIN LISPRO 4 UNITS: 100 INJECTION, SOLUTION INTRAVENOUS; SUBCUTANEOUS at 21:47

## 2025-04-20 RX ADMIN — CEFAZOLIN SODIUM 2 G: 2 INJECTION, SOLUTION INTRAVENOUS at 16:07

## 2025-04-20 RX ADMIN — SENNOSIDES 8.6 MG: 8.6 TABLET, FILM COATED ORAL at 08:23

## 2025-04-20 RX ADMIN — INSULIN LISPRO 4 UNITS: 100 INJECTION, SOLUTION INTRAVENOUS; SUBCUTANEOUS at 17:35

## 2025-04-20 RX ADMIN — DIPHENHYDRAMINE HYDROCHLORIDE 25 MG: 25 CAPSULE ORAL at 11:16

## 2025-04-20 RX ADMIN — CALCIUM GLUCONATE 2 G: 20 INJECTION, SOLUTION INTRAVENOUS at 02:33

## 2025-04-20 RX ADMIN — SODIUM CHLORIDE 50 ML/HR: 9 INJECTION, SOLUTION INTRAVENOUS at 15:00

## 2025-04-20 RX ADMIN — SODIUM ZIRCONIUM CYCLOSILICATE 10 G: 10 POWDER, FOR SUSPENSION ORAL at 18:37

## 2025-04-20 RX ADMIN — OXYCODONE 10 MG: 5 TABLET ORAL at 08:23

## 2025-04-20 RX ADMIN — TACROLIMUS 1 MG: 1 CAPSULE ORAL at 06:42

## 2025-04-20 RX ADMIN — MAGNESIUM SULFATE HEPTAHYDRATE 1 G: 1 INJECTION, SOLUTION INTRAVENOUS at 11:14

## 2025-04-20 RX ADMIN — POLYETHYLENE GLYCOL 3350 17 G: 17 POWDER, FOR SOLUTION ORAL at 14:51

## 2025-04-20 RX ADMIN — ACETAMINOPHEN 650 MG: 325 TABLET, FILM COATED ORAL at 06:41

## 2025-04-20 RX ADMIN — CLOTRIMAZOLE 10 MG: 10 LOZENGE ORAL at 16:10

## 2025-04-20 RX ADMIN — MYCOPHENOLATE MOFETIL 1000 MG: 250 CAPSULE ORAL at 06:40

## 2025-04-20 RX ADMIN — ACETAMINOPHEN 650 MG: 325 TABLET, FILM COATED ORAL at 18:31

## 2025-04-20 RX ADMIN — SODIUM ZIRCONIUM CYCLOSILICATE 10 G: 10 POWDER, FOR SUSPENSION ORAL at 02:33

## 2025-04-20 RX ADMIN — HYDROMORPHONE HYDROCHLORIDE 0.2 MG: 0.5 INJECTION, SOLUTION INTRAMUSCULAR; INTRAVENOUS; SUBCUTANEOUS at 05:25

## 2025-04-20 RX ADMIN — HEPARIN SODIUM 200 MG: 1000 INJECTION INTRAVENOUS; SUBCUTANEOUS at 11:59

## 2025-04-20 RX ADMIN — DEXTROSE MONOHYDRATE 250 MG: 50 INJECTION, SOLUTION INTRAVENOUS at 08:23

## 2025-04-20 RX ADMIN — ACETAMINOPHEN 650 MG: 325 TABLET, FILM COATED ORAL at 11:15

## 2025-04-20 RX ADMIN — TACROLIMUS 1 MG: 1 CAPSULE ORAL at 18:32

## 2025-04-20 RX ADMIN — OXYCODONE 10 MG: 5 TABLET ORAL at 17:40

## 2025-04-20 RX ADMIN — OXYCODONE 5 MG: 5 TABLET ORAL at 13:38

## 2025-04-20 RX ADMIN — PANTOPRAZOLE SODIUM 40 MG: 40 TABLET, DELAYED RELEASE ORAL at 16:14

## 2025-04-20 RX ADMIN — FUROSEMIDE 80 MG: 10 INJECTION, SOLUTION INTRAVENOUS at 08:23

## 2025-04-20 RX ADMIN — ACETAMINOPHEN 650 MG: 325 TABLET, FILM COATED ORAL at 23:34

## 2025-04-20 RX ADMIN — CEFAZOLIN SODIUM 2 G: 2 INJECTION, SOLUTION INTRAVENOUS at 08:24

## 2025-04-20 RX ADMIN — INSULIN HUMAN 20 UNITS: 100 INJECTION, SUSPENSION SUBCUTANEOUS at 08:52

## 2025-04-20 ASSESSMENT — ENCOUNTER SYMPTOMS
FATIGUE: 1
ABDOMINAL PAIN: 1
ROS GI COMMENTS: APPROPRIATE TO PROCEDURE

## 2025-04-20 ASSESSMENT — COGNITIVE AND FUNCTIONAL STATUS - GENERAL
MOBILITY SCORE: 24
DAILY ACTIVITIY SCORE: 24

## 2025-04-20 ASSESSMENT — PAIN SCALES - GENERAL
PAINLEVEL_OUTOF10: 5 - MODERATE PAIN
PAINLEVEL_OUTOF10: 6
PAINLEVEL_OUTOF10: 6
PAINLEVEL_OUTOF10: 8
PAINLEVEL_OUTOF10: 7
PAINLEVEL_OUTOF10: 8
PAINLEVEL_OUTOF10: 6
PAINLEVEL_OUTOF10: 6

## 2025-04-20 ASSESSMENT — PAIN DESCRIPTION - ORIENTATION
ORIENTATION: RIGHT;LOWER

## 2025-04-20 ASSESSMENT — PAIN DESCRIPTION - LOCATION
LOCATION: ABDOMEN

## 2025-04-20 ASSESSMENT — PAIN - FUNCTIONAL ASSESSMENT
PAIN_FUNCTIONAL_ASSESSMENT: 0-10

## 2025-04-20 ASSESSMENT — PAIN DESCRIPTION - DESCRIPTORS
DESCRIPTORS: THROBBING
DESCRIPTORS: ACHING;DISCOMFORT

## 2025-04-20 NOTE — CONSULTS
"Transplant Nephrology Consult     Date of admission: 4/18/2025     Tanmay Mcneill is a 39 y.o.  with PMH Medical History[1]     History of present Illness:Tanmay Mcneill is a 39 y.o.  male with ESRD sec to DM2, on HD since 1/19/2022 ([facility], MWF, [facility], Dr. Rhodes), HTN, obesity, asthma, arthritis currently s/p DDKT on 4/19/2025.  Anuric versus oliguric at baseline.  Been on dialysis for 3 years through right upper extremity AV fistula. Currently s/p kidney transplant on 4/19/2025.        Past Medical History :Medical History[2]     Surgical History:Surgical History[3]     Family HX:Family History[4]     Social Connections: Not on file            PROBLEM LIST:  Assessment & Plan  ESRD (end stage renal disease) (Multi)    Type 2 diabetes mellitus with hyperglycemia, with long-term current use of insulin    Steroid-induced hyperglycemia           ALLERGIES:  Allergies[5]         CURRENT MEDICATIONS:  Scheduled medications  Scheduled Medications[6]  Continuous medications  Continuous Medications[7]  PRN medications  PRN Medications[8]       OBJECTIVE:    VITALS: Visit Vitals  /83 (BP Location: Left arm, Patient Position: Lying)   Pulse 94   Temp 36.4 °C (97.5 °F) (Temporal)   Resp 17   Ht 1.854 m (6' 1\")   Wt (!) 157 kg (346 lb 2 oz)   SpO2 95%   BMI 45.67 kg/m²   Smoking Status Never   BSA 2.84 m²        General: No distress   Mucosa moist   AI, AC, AF     HEENT: PEERLA  CVS: S1 S2 no murmurs  RESP:  Lungs clear to auscultation   ABDO: Soft, surgical sight with staples intact and drain .  Neuro: A + O x 3  Skin: No rash   Extremities: No edema       LABS:  Results from last 72 hours   Lab Units 04/20/25  1322   WBC AUTO x10*3/uL 14.1*   HEMOGLOBIN g/dL 9.8*   MCV fL 82   PLATELETS AUTO x10*3/uL 134*   BUN mg/dL 54*   CREATININE mg/dL 12.34*   CALCIUM mg/dL 8.7            Intake/Output Summary (Last 24 hours) at 4/20/2025 1547  Last data filed at 4/20/2025 1300  Gross per 24 hour   Intake 797.5 ml   Output " 294.5 ml   Net 503 ml            ASSESSMENT AND PLAN:    Tanmay Mcneill is a 39 y.o.  male with ESRD sec to DM2, on HD since 1/19/2022 ([facility], MWF, [facility], Dr. Rhodes), HTN, obesity, asthma, arthritis currently s/p DDKT on 4/19/2025.  Anuric versus oliguric at baseline.  Been on dialysis for 3 years through right upper extremity AV fistula    Donor kidney info:  Etiology:  DCD  Risk: no but was lung tx recipient  Terminal Cr  1.08   KDPI: 48 %  PRA: 19 %  CMV: D-/R+  EBV: D+/R+  Toxo: Donor Negative  HCV Ab/MELISSA: Negative  HBcAB: Negative  HBsAg/HBV MELISSA: Negative.  Full cava used as venous extension 2/2 depth    Allograft function:  - Expecting DGF with a slow graft function.  Mild hyperkalemia noticed continue with Lokelma 3 times daily.  - Ultrasound transplant unremarkable.  - Monitor kidney function closely.  - 7-day Pimentel due to difficult Pimentel placement    Immunosuppression:  - Total time of dose planned is 4.5 mg/kg dose 2-today-200 mg.  - Planning triple immunosuppression.    Hemodynamics: Seems to be stable continue with the current management.    Bone mineral disease: Low magnesium levels otherwise calcium and phosphorus levels are optimal continue with the current management.    Anemia/leukopenia: Monitor hemoglobin closely mild leukocytosis likely in the setting of steroid use.      Thank you for consulting :  Eliezer Argueta MD    Notes created by [employer] -Please excuse the Typos .                    [1]   Past Medical History:  Diagnosis Date    Chronic kidney disease     Diabetes mellitus (Multi)     Hypertension     Type 2 diabetes mellitus    [2]   Past Medical History:  Diagnosis Date    Chronic kidney disease     Diabetes mellitus (Multi)     Hypertension     Type 2 diabetes mellitus    [3]   Past Surgical History:  Procedure Laterality Date    OTHER SURGICAL HISTORY  12/18/2017    History Of Prior Surgery    OTHER SURGICAL HISTORY  04/08/2020    Hip surgery    TRANSPLANTATION RENAL     [4]    Family History  Problem Relation Name Age of Onset    COPD Mother      Diabetes Mother      Hypertension Mother      Kidney failure Father      Kidney disease Father          stage IV    Diabetes type II Father     [5] No Known Allergies  [6] acetaminophen, 650 mg, oral, q6h ANURADHA  acetaminophen, 975 mg, oral, Once  antithymocyte globulin (rabbit), 200 mg, intravenous, Once  ceFAZolin, 2 g, intravenous, Once  ceFAZolin, 2 g, intravenous, q8h  clotrimazole, 10 mg, Mouth/Throat, TID after meals  furosemide, 80 mg, intravenous, q8h  gabapentin, 300 mg, oral, Once  insulin lispro, 0-10 Units, subcutaneous, Before meals & nightly  insulin NPH (Isophane), 20 Units, subcutaneous, q AM  [START ON 4/21/2025] methylPREDNISolone sodium succinate (PF), 125 mg, intravenous, Once  [START ON 4/22/2025] methylPREDNISolone sodium succinate (PF), 60 mg, intravenous, Once  mycophenolate, 1,000 mg, oral, q12h ANURADHA  pantoprazole, 40 mg, oral, BID AC  polyethylene glycol, 17 g, oral, Daily  [START ON 4/23/2025] predniSONE, 20 mg, oral, Daily  sennosides, 1 tablet, oral, BID  sodium zirconium cyclosilicate, 10 g, oral, q8h  tacrolimus, 1 mg, oral, q12h ANURADHA  [7] sodium chloride 0.9%, 50 mL/hr  [8] PRN medications: dextrose, dextrose, glucagon, glucagon, HYDROmorphone, naloxone, ondansetron **OR** ondansetron, oxyCODONE, oxyCODONE, oxygen

## 2025-04-20 NOTE — CONSULTS
Inpatient consult to Endocrinology  Consult performed by: Disha Maynard PA-C  Consult ordered by: Joanna Rdz PA-C  Reason for consult: DM2 and obesity with steroid induced hyperglycemia s/p DDKT  Assessment/Recommendations: Pt would benefit from weight-based insulin regimen.  Pt would benefit from NPH paired to each dose of steroid.           Reason For Consult  DM2 and obesity with steroid induced hyperglycemia s/p DDKT     History Of Present Illness  Tanmay Mcneill is a 39 y.o. male presenting with PMHx significant for ESRD 2/2 T2DM, as well as HTN, asthma, arthritis, and obesity who presents for  donor kidney transplant.     Diabetes History  Type of diabetes: 2  Year diagnosed or age: several years ago, first emr mention in 2018  Hospitalizations for DKA or HHS: none  Complications: ESRD now s/p DDKT, obesity  Seen by PCP or Endocrinology: PCP  Frequency of glucose checks: 0  Glucose review: no home record  Frequency of Hypoglycemia: none  Hypoglycemia unawareness: no  Severe hypoglycemia requiring assistance from others:  no    Home Medications  GLP-1: mounjaro 2.5mg x5 weeks so far  CGM: none  Previous meds: ozempic (GI upset), glargine 8u/day         Results from Most Recent A1C  Hemoglobin A1C   Date/Time Value Ref Range Status   2025 10:54 AM 7.8 (H) 4.3 - 5.6 % Final     Comment:     American Diabetes Association guidelines indicate that patients with HgbA1c in the range 5.7-6.4% are at increased risk for development of diabetes, and intervention by lifestyle modification may be beneficial. HgbA1c greater or equal to 6.5% is considered diagnostic of diabetes.   2024 02:36 PM 7.2 (H) see below % Final        Diabetes Problem List Entries with Dates  Problem List:  2025: Type 2 diabetes mellitus with hyperglycemia, with long-term   current use of insulin  2023-10: Type 2 diabetes mellitus    Past Medical History  He has a past medical history of Chronic kidney disease,  Diabetes mellitus (Multi), Hypertension, and Type 2 diabetes mellitus.    Surgical History  He has a past surgical history that includes Other surgical history (12/18/2017); Other surgical history (04/08/2020); and Kidney transplant.     Social History  He reports that he has never smoked. He has never been exposed to tobacco smoke. He has never used smokeless tobacco. He reports that he does not drink alcohol and does not use drugs.    Family History  Family History[1]     Allergies  Patient has no known allergies.    Review of Systems   Constitutional:  Positive for fatigue.   Gastrointestinal:  Positive for abdominal pain.        Appropriate to procedure   All other systems reviewed and are negative.       Physical Exam  Constitutional:       Appearance: Normal appearance. He is obese.   HENT:      Head: Normocephalic and atraumatic.      Nose: Nose normal.      Mouth/Throat:      Mouth: Mucous membranes are dry.      Pharynx: Oropharynx is clear.   Eyes:      Extraocular Movements: Extraocular movements intact.      Conjunctiva/sclera: Conjunctivae normal.   Cardiovascular:      Rate and Rhythm: Normal rate and regular rhythm.      Pulses: Normal pulses.   Pulmonary:      Effort: Pulmonary effort is normal.      Breath sounds: Normal breath sounds.   Abdominal:      General: Abdomen is flat. Bowel sounds are normal.      Palpations: Abdomen is soft.   Skin:     General: Skin is warm and dry.   Neurological:      General: No focal deficit present.      Mental Status: He is alert and oriented to person, place, and time. Mental status is at baseline.   Psychiatric:         Mood and Affect: Mood normal.         Behavior: Behavior normal.         Thought Content: Thought content normal.         Judgment: Judgment normal.          ROS, PMH, FH/SH, surgical history and allergies have been reviewed.    Last Recorded Vitals  Blood pressure 146/83, pulse 94, temperature 36.4 °C (97.5 °F), temperature source Temporal,  "resp. rate 17, height 1.854 m (6' 1\"), weight (!) 157 kg (346 lb 2 oz), SpO2 95%.    Relevant Results  Results from last 7 days   Lab Units 04/20/25  1322 04/20/25  1232 04/20/25  0640 04/20/25  0524 04/20/25  0230 04/20/25  0050 04/19/25  2246 04/19/25  1923 04/19/25  1525 04/19/25  0735   POCT GLUCOSE mg/dL  --  197* 186*  --  224*  --  187*  --   --  173*   GLUCOSE mg/dL 201*  --   --  172*  --  197*  --  212* 206*  --       Scheduled medications  Scheduled Medications[2]  Continuous medications  Continuous Medications[3]  PRN medications  PRN Medications[4]       Assessment/Plan   Assessment & Plan  ESRD (end stage renal disease) (Multi)    Type 2 diabetes mellitus with hyperglycemia, with long-term current use of insulin    Steroid-induced hyperglycemia      PLAN  Steroids:  methylpred 500mg intra-op  methylpred 250 mg x1d  methylpred 125mg x1d  methylpred 60mg x1d  pred 20mg ongoing     Nutrition: NPO     - continue NPH 20u with each solumedrol dose     If NPO: continue     If steroid is held: hold    - start lispro 4u with meals if pt tolerates PO and glucose trending >180mg/dL       If NPO or glucose <90mg/dL within 1hr before meal: hold     - continue lispro corrective scale #2 q4h, adjust to scale #3 q4hr if persistently hyperglycemic >250mg/dL, adjust to ACHS when pt progresses to PO    = 0u  151-200 = 2u  201-250 = 4u  251-300 = 6u  301-350 = 8u  351-400 = 10u     -Accuchecks q4hr   - Goal BG <200  -Hypoglycemia protocol  -Will continue to follow and titrate insulin accordingly     Discharge planning:   [] patient may expect to discharge home on NPH and lispro, final doses TBD by titration  []will provide CGM sample prior to discharge - will need leandro hema downloaded  []will enroll pt in  pharmacy platinum plan program  [x]follow up with Disha Maynard PA-C in PTDM clinic, appointment request sent on 4/20/25      I spent 80 minutes in the professional and overall care of this " patient.      Disha TERESA Maynard PA-C         [1]   Family History  Problem Relation Name Age of Onset    COPD Mother      Diabetes Mother      Hypertension Mother      Kidney failure Father      Kidney disease Father          stage IV    Diabetes type II Father     [2] acetaminophen, 650 mg, oral, q6h ANURADHA  acetaminophen, 975 mg, oral, Once  antithymocyte globulin (rabbit), 200 mg, intravenous, Once  ceFAZolin, 2 g, intravenous, Once  ceFAZolin, 2 g, intravenous, q8h  clotrimazole, 10 mg, Mouth/Throat, TID after meals  furosemide, 80 mg, intravenous, q8h  gabapentin, 300 mg, oral, Once  insulin lispro, 0-10 Units, subcutaneous, q4h ANURADHA  insulin NPH (Isophane), 20 Units, subcutaneous, q AM  [START ON 4/21/2025] methylPREDNISolone sodium succinate (PF), 125 mg, intravenous, Once  [START ON 4/22/2025] methylPREDNISolone sodium succinate (PF), 60 mg, intravenous, Once  mycophenolate, 1,000 mg, oral, q12h ANURADHA  pantoprazole, 40 mg, oral, BID AC  polyethylene glycol, 17 g, oral, Daily  [START ON 4/23/2025] predniSONE, 20 mg, oral, Daily  sennosides, 1 tablet, oral, BID  sodium zirconium cyclosilicate, 10 g, oral, q8h  tacrolimus, 1 mg, oral, q12h ANURADHA     [3] sodium chloride, 60 mL/hr, Last Rate: 60 mL/hr (04/20/25 0800)  sodium chloride 0.9%, 999 mL/hr, Last Rate: 2.5 mL/hr (04/20/25 0600)     [4] PRN medications: dextrose, dextrose, glucagon, glucagon, HYDROmorphone, naloxone, ondansetron **OR** ondansetron, oxyCODONE, oxyCODONE, oxygen

## 2025-04-20 NOTE — PROGRESS NOTES
"Tanmay Mcneill is a 39 y.o. male now POD # 1 s/p DDKT transplant.    Subjective   Doing well. Treated for hyperkalemia. Minimal uop       Objective   Physical Exam  Gen: A+OX3; NAD  Abd: S/NT/ND. Incision C/D/I.  Drains: Serosanguinous  x2  Ext: trace LE edema    Last Recorded Vitals  Blood pressure 146/83, pulse 94, temperature 36.4 °C (97.5 °F), temperature source Temporal, resp. rate 17, height 1.854 m (6' 1\"), weight (!) 157 kg (346 lb 2 oz), SpO2 95%.  Intake/Output last 3 Shifts:  I/O last 3 completed shifts:  In: 1493.2 (9.8 mL/kg) [I.V.:843.8 (5.5 mL/kg); IV Piggyback:649.4]  Out: 417 (2.7 mL/kg) [Urine:269.5 (0 mL/kg/hr); Drains:147.5]  Weight: 152.8 kg      Lab Results   Component Value Date    CREATININE 12.34 (H) 04/20/2025    K 5.0 04/20/2025    GLUCOSE 201 (H) 04/20/2025    HGB 9.8 (L) 04/20/2025    WBC 14.1 (H) 04/20/2025     (L) 04/20/2025    CALCIUM 8.7 04/20/2025       Current Medications[1]     Assessment/Plan    ESRD 2/2 DM and HTN    S/p DDKT 4/19/25   DCD donor, KDPI 48%  Full cava used as venous extension 2/2 depth  Slow graft function, expect DGF  Continue BRETT x 2 (lateral deep, medial subcutaneous)  Hold DVT ppx for now- possible start tomorrow  Lokelma TID for hyperkalemia  Encourage OOB  Pimentel 7 days - difficult placement/false passage. Urology scope to place  Initial US good, plan to repeat 1-2 days    Immunosuppression   Goal thymo 4.5mg/kg - give second dose today  Tacrolimus - starting 2/2  MMF 1 g BID  Steroid taper    3. Diabetes  Appreciate endocrine     Donor Kidney Info:  Etiology:  DCD  Risk: no but was lung tx recipient  Terminal Cr  1.08   KDPI: 48 %  PRA: 19 %  CMV: D-/R+  EBV: D+/R+  Toxo: Donor Negative  HCV Ab/MELISSA: Negative  HBcAB: Negative  HBsAg/HBV MELISSA: Negative     I spent 35 minutes in the professional and overall care of this patient.      Doris Ontiveros MD         [1]   Current Facility-Administered Medications:     acetaminophen (Tylenol) tablet 650 mg, 650 mg, " oral, q6h ANURADHA, Doris Ontiveros MD, 650 mg at 04/20/25 0641    acetaminophen (Tylenol) tablet 975 mg, 975 mg, oral, Once, Doris Ontiveros MD    anti-thymocyte globulin rabbit (Thymoglobulin) 200 mg, hydrocortisone sodium succinate (PF) (Solu-CORTEF) 20 mg, heparin 1,000 Units in sodium chloride 0.9% 500 mL IV, 200 mg, intravenous, Once, Joanna Rdz PA-C, 200 mg at 04/20/25 1159    ceFAZolin (Ancef) 2 g in dextrose (iso)  mL, 2 g, intravenous, Once, Doris Ontiveros MD    ceFAZolin (Ancef) 2 g in dextrose (iso)  mL, 2 g, intravenous, q8h, Doris Ontiveros MD, Stopped at 04/20/25 0854    clotrimazole (Mycelex) villa 10 mg, 10 mg, Mouth/Throat, TID after meals, Doris Ontiveros MD, 10 mg at 04/20/25 0823    dextrose 50 % injection 12.5 g, 12.5 g, intravenous, q15 min PRN, Doris Ontiveros MD    dextrose 50 % injection 25 g, 25 g, intravenous, q15 min PRN, Doris Ontiveros MD    furosemide (Lasix) injection 80 mg, 80 mg, intravenous, q8h, Doris Ontiveros MD, 80 mg at 04/20/25 0823    gabapentin (Neurontin) capsule 300 mg, 300 mg, oral, Once, Doris Ontiveros MD    glucagon (Glucagen) injection 1 mg, 1 mg, intramuscular, q15 min PRN, Doris Ontiveros MD    glucagon (Glucagen) injection 1 mg, 1 mg, intramuscular, q15 min PRN, Doris Ontiveros MD    HYDROmorphone (Dilaudid) injection 0.2 mg, 0.2 mg, intravenous, q3h PRN, Doris Ontiveros MD, 0.2 mg at 04/20/25 0525    insulin lispro injection 0-10 Units, 0-10 Units, subcutaneous, Before meals & nightly, Disha Maynard PA-C    insulin NPH (Isophane) (HumuLIN N,NovoLIN N) injection 20 Units, 20 Units, subcutaneous, q AM, Disha Maynard PA-C, 20 Units at 04/20/25 0852    [START ON 4/21/2025] methylPREDNISolone sod succinate (SOLU-Medrol) injection 125 mg, 125 mg, intravenous, Once, Doris Ontiveros MD    [START ON 4/22/2025] methylPREDNISolone sod succinate (SOLU-Medrol) injection 60 mg, 60 mg, intravenous, Once, Doris Ontiveros MD    mycophenolate (Cellcept) capsule 1,000 mg, 1,000  mg, oral, q12h ANURADHA, Neha Edouard MD, 1,000 mg at 04/20/25 0640    naloxone (Narcan) injection 0.2 mg, 0.2 mg, intravenous, q5 min PRN, Doris Ontiveros MD    ondansetron (Zofran) tablet 4 mg, 4 mg, oral, q8h PRN **OR** ondansetron (Zofran) injection 4 mg, 4 mg, intravenous, q8h PRN, Doris Ontiveros MD    oxyCODONE (Roxicodone) immediate release tablet 10 mg, 10 mg, oral, q6h PRN, Joanna Rdz PA-C, 10 mg at 04/20/25 0823    oxyCODONE (Roxicodone) immediate release tablet 5 mg, 5 mg, oral, q4h PRN, Doris Ontiveros MD, 5 mg at 04/20/25 1338    oxygen (O2) therapy, , inhalation, Continuous PRN - O2/gases, Doris Ontiveros MD    pantoprazole (ProtoNix) EC tablet 40 mg, 40 mg, oral, BID AC, Doris Ontiveros MD, 40 mg at 04/20/25 0640    polyethylene glycol (Glycolax, Miralax) packet 17 g, 17 g, oral, Daily, Doris Ontiveros MD    [START ON 4/23/2025] predniSONE (Deltasone) tablet 20 mg, 20 mg, oral, Daily, Doris Ontiveros MD    sennosides (Senokot) tablet 8.6 mg, 1 tablet, oral, BID, Doris Ontiveros MD, 8.6 mg at 04/20/25 0823    sodium chloride 0.9% infusion, 999 mL/hr, intravenous, Continuous, Doris Ontiveros MD, Last Rate: 2.5 mL/hr at 04/20/25 0600, 2.5 mL/hr at 04/20/25 0600    sodium chloride 0.9% infusion, 50 mL/hr, intravenous, Continuous, Doris Ontiveros MD    sodium zirconium cyclosilicate (Lokelma) packet 10 g, 10 g, oral, q8h, Neha Edouard MD, 10 g at 04/20/25 1115    tacrolimus (Prograf) capsule 1 mg, 1 mg, oral, q12h [facility], Neha Edouard MD, 1 mg at 04/20/25 7797

## 2025-04-20 NOTE — OP NOTE
Transplant Kidney (R) Operative Note     Date: 2025  OR Location: [facility] OR    Name: Tanmay Mcneill, : 1985, Age: 39 y.o., MRN: [id number], Sex: male    Diagnosis  Pre-op Diagnosis      * ESRD (end stage renal disease) (Multi) [N18.6] Post-op Diagnosis     * ESRD (end stage renal disease) (Multi) [N18.6]     Procedures  1. Back bench preparation of kidney transplant allograft.   2.  donor kidney transplant with placement of one ureteral stent(s).   3. Donor venous reconstruction     Surgeons      * Doris Ontiveros - Primary    Resident/Fellow/Other Assistant:  Surgeons and Role:  * No surgeons found with a matching role *      Indication for Procedure: The patient was diagnosed with renal failure and was found to be a suitable candidate for kidney transplantation here at [facility]. The patient was allocated an appropriately matched kidney through the OPTN/UNOS allocation system. UNOS ID# was [id number].  After weighing the risks and benefits, the patient agreed to proceed with kidney transplant.     Description of Procedure:   We began with back bench preparation of the kidney transplant allograft. The donor kidney was a right kidney. The donor kidney anatomy included single renal vein, artery and ureter. We then dissected the vessels up toward the hilum. The venous branches were tied with 3-0 and 4-0 silk ties. The artery was dissected circumferentially. The ureter was identified and excess tissue was removed. The excess fat on the kidney was removed. The right renal vein was short and thin. This was extended using the full vena cava. The superior border of the cava was closed with TA stapler load.     The patient was brought to the operating room and placed in the supine position on the operating room table, and underwent a smooth induction of general endotracheal anesthesia. The patient was prepped and draped in the normal aseptic fashion.     Pimentel placement was difficult.  Resulting  in urology intraoperative consult for assistance and Pimentel placed.    We began the operation with a right iliac fossa exposure. A curvilinear incision was made. Subcutaneous tissues were dissected with cautery. Fascia was incised with cautery. The peritoneum and its contents were medialized, exposing the iliac fossa. The inferior epigastric vessels were divided and ligated with 2-0 silk ties.  The right spermatic cord was ligated between silk sutures.  We placed a Bookwalter retractor. The external iliac artery and vein were dissected circumferentially.     We then began the implantation process. The external iliac vein was clamped with a Lambert chris clamp and the venous anastomosis was completed using fine 5-0 Prolene sutures in a continuous fashion.  A bulldog was placed on the donor cava and the clamp on the external iliac vein was removed.    We then turned our attention to the artery. The artery was controlled with a Satinsky clamp. An arteriotomy was made with an 11 blade and enlarged with a 4-mm aortic punch. The arterial anastomosis was completed using fine 6-0 Prolene sutures in a continuous fashion. We then reperfused the kidney by removing the arterial and venous clamps. Bleeding points on the kidney were controlled with cautery. Total ischemic time was 22 hours 25 minutes, anastomosis time was 48 minutes.      We then turned our attention to the ureteral anastomosis. The bladder was filled with an saline solution instilled with betadine. The fat over the bladder was incised. The muscle was incised, exposing the mucosa below. The ureter was shortened and the blood supply was ligated with 3-0 silk tie. The ureter was spatulated.   The bladder mucosa was incised.  We used a Lich technique to complete the ureter-to-bladder mucosa anastomosis. The anastomosis was completed using fine absorbable PDS sutures in a continuous fashion. A double-J ureteral stent was placed across the anastomosis while completing  it. The muscle was reapproximated over the ureteral anastomosis using absorbable sutures.     We then inspected the kidney once again for any bleeding and examined the kidney during wound closure for any malperfusion; there was none.  A 19 Serbian Enid drain was placed through separate stab incision and oriented along the kidney.  The fascia was closed with a running non looped PDS suture in two layers. A second 18 fr enid drain was placed in the subcutaneous tissue. Greyson's and deep dermis were reapproximated 3-0 Vicryl's and the skin was closed with staples.  The patient was awoken from anesthesia and transferred to recovery in stable condition. I was present and scrubbed for the entire procedure.     The operation was of sufficient complexity to require a first assistant during initial incision, operative exposure, allograft preparation, operative exposure, creation of multiple vascular and excretory anastomoses, hemostasis, as well as wound closure.    Drains and/or Catheters:   Closed/Suction Drain 1 RUQ Bulb (Active)   Site Description Healing 04/20/25 1100   Dressing Status Old drainage;Dry 04/20/25 1100   Drainage Appearance Bloody 04/20/25 1100   Status To bulb suction 04/20/25 1100   Output (mL) 40 mL 04/20/25 1100       Closed/Suction Drain 2 RLQ Bulb (Active)   Site Description Healing 04/20/25 1100   Dressing Status Old drainage;Dry 04/20/25 1100   Drainage Appearance Bloody 04/20/25 1100   Status To bulb suction 04/20/25 1100   Output (mL) 15 mL 04/20/25 1100       Urethral Catheter Coude (Active)   Site Assessment Clean;Skin intact 04/20/25 0800   Collection Container Standard drainage bag 04/20/25 0800   Securement Method Other (Comment) 04/20/25 0800   Reason for Continuing Urinary Catheterization surgical procedures: urological/gynecological, pelvic oncology, anal, prolonged surgical procedure 04/20/25 0800   Output (mL) 5 mL 04/20/25 1300             Implants:  Implants       Type Name Action  Serial No.      Stent STENT, POLARIS ULTRA 5FR X 12CM, W/O WIRE - [id number] Implanted              Attending Attestation: I was present and scrubbed for the entire procedure.    Doris Ontiveros  Phone Number: [phone]

## 2025-04-20 NOTE — CARE PLAN
The patient's goals for the shift include      The clinical goals for the shift include patient will remain HDS during shift    Problem: Pain - Adult  Goal: Verbalizes/displays adequate comfort level or baseline comfort level  Outcome: Progressing     Problem: Safety - Adult  Goal: Free from fall injury  Outcome: Progressing     Problem: Chronic Conditions and Co-morbidities  Goal: Patient's chronic conditions and co-morbidity symptoms are monitored and maintained or improved  Outcome: Progressing     Problem: Nutrition  Goal: Nutrient intake appropriate for maintaining nutritional needs  Outcome: Progressing

## 2025-04-21 ENCOUNTER — DOCUMENTATION (OUTPATIENT)
Dept: TRANSPLANT | Facility: HOSPITAL | Age: 40
End: 2025-04-21
Payer: COMMERCIAL

## 2025-04-21 ENCOUNTER — APPOINTMENT (OUTPATIENT)
Dept: RADIOLOGY | Facility: HOSPITAL | Age: 40
End: 2025-04-21
Payer: COMMERCIAL

## 2025-04-21 ENCOUNTER — APPOINTMENT (OUTPATIENT)
Dept: DIALYSIS | Facility: HOSPITAL | Age: 40
End: 2025-04-21
Payer: COMMERCIAL

## 2025-04-21 LAB
ALBUMIN SERPL BCP-MCNC: 3.7 G/DL (ref 3.4–5)
ANION GAP SERPL CALC-SCNC: 22 MMOL/L (ref 10–20)
BASOPHILS # BLD AUTO: 0 X10*3/UL (ref 0–0.1)
BASOPHILS NFR BLD AUTO: 0 %
BUN SERPL-MCNC: 78 MG/DL (ref 6–23)
CA-I BLD-SCNC: 0.94 MMOL/L (ref 1.1–1.33)
CALCIUM SERPL-MCNC: 8.6 MG/DL (ref 8.6–10.6)
CHLORIDE SERPL-SCNC: 92 MMOL/L (ref 98–107)
CO2 SERPL-SCNC: 19 MMOL/L (ref 21–32)
CREAT SERPL-MCNC: 13.39 MG/DL (ref 0.5–1.3)
EGFRCR SERPLBLD CKD-EPI 2021: 4 ML/MIN/1.73M*2
EOSINOPHIL # BLD AUTO: 0 X10*3/UL (ref 0–0.7)
EOSINOPHIL NFR BLD AUTO: 0 %
ERYTHROCYTE [DISTWIDTH] IN BLOOD BY AUTOMATED COUNT: 17 % (ref 11.5–14.5)
GLUCOSE BLD MANUAL STRIP-MCNC: 241 MG/DL (ref 74–99)
GLUCOSE BLD MANUAL STRIP-MCNC: 265 MG/DL (ref 74–99)
GLUCOSE SERPL-MCNC: 233 MG/DL (ref 74–99)
HCT VFR BLD AUTO: 29 % (ref 41–52)
HGB BLD-MCNC: 9.1 G/DL (ref 13.5–17.5)
IMM GRANULOCYTES # BLD AUTO: 0.05 X10*3/UL (ref 0–0.7)
IMM GRANULOCYTES NFR BLD AUTO: 0.5 % (ref 0–0.9)
LYMPHOCYTES # BLD AUTO: 0.18 X10*3/UL (ref 1.2–4.8)
LYMPHOCYTES NFR BLD AUTO: 1.9 %
MAGNESIUM SERPL-MCNC: 2.12 MG/DL (ref 1.6–2.4)
MCH RBC QN AUTO: 23.9 PG (ref 26–34)
MCHC RBC AUTO-ENTMCNC: 31.4 G/DL (ref 32–36)
MCV RBC AUTO: 76 FL (ref 80–100)
MONOCYTES # BLD AUTO: 0.66 X10*3/UL (ref 0.1–1)
MONOCYTES NFR BLD AUTO: 6.8 %
NEUTROPHILS # BLD AUTO: 8.81 X10*3/UL (ref 1.2–7.7)
NEUTROPHILS NFR BLD AUTO: 90.8 %
NRBC BLD-RTO: 0 /100 WBCS (ref 0–0)
PHOSPHATE SERPL-MCNC: 7.3 MG/DL (ref 2.5–4.9)
PLATELET # BLD AUTO: 147 X10*3/UL (ref 150–450)
POTASSIUM SERPL-SCNC: 5.1 MMOL/L (ref 3.5–5.3)
RBC # BLD AUTO: 3.8 X10*6/UL (ref 4.5–5.9)
SODIUM SERPL-SCNC: 128 MMOL/L (ref 136–145)
TACROLIMUS BLD-MCNC: <2 NG/ML
WBC # BLD AUTO: 9.7 X10*3/UL (ref 4.4–11.3)

## 2025-04-21 PROCEDURE — 76776 US EXAM K TRANSPL W/DOPPLER: CPT | Performed by: STUDENT IN AN ORGANIZED HEALTH CARE EDUCATION/TRAINING PROGRAM

## 2025-04-21 PROCEDURE — 2500000002 HC RX 250 W HCPCS SELF ADMINISTERED DRUGS (ALT 637 FOR MEDICARE OP, ALT 636 FOR OP/ED): Performed by: PHYSICIAN ASSISTANT

## 2025-04-21 PROCEDURE — 2500000004 HC RX 250 GENERAL PHARMACY W/ HCPCS (ALT 636 FOR OP/ED)

## 2025-04-21 PROCEDURE — 99233 SBSQ HOSP IP/OBS HIGH 50: CPT | Performed by: INTERNAL MEDICINE

## 2025-04-21 PROCEDURE — 36415 COLL VENOUS BLD VENIPUNCTURE: CPT | Performed by: PHYSICIAN ASSISTANT

## 2025-04-21 PROCEDURE — 76776 US EXAM K TRANSPL W/DOPPLER: CPT

## 2025-04-21 PROCEDURE — 2500000002 HC RX 250 W HCPCS SELF ADMINISTERED DRUGS (ALT 637 FOR MEDICARE OP, ALT 636 FOR OP/ED)

## 2025-04-21 PROCEDURE — 82947 ASSAY GLUCOSE BLOOD QUANT: CPT

## 2025-04-21 PROCEDURE — 2500000001 HC RX 250 WO HCPCS SELF ADMINISTERED DRUGS (ALT 637 FOR MEDICARE OP)

## 2025-04-21 PROCEDURE — 85025 COMPLETE CBC W/AUTO DIFF WBC: CPT | Performed by: PHYSICIAN ASSISTANT

## 2025-04-21 PROCEDURE — 2500000004 HC RX 250 GENERAL PHARMACY W/ HCPCS (ALT 636 FOR OP/ED): Mod: JZ | Performed by: STUDENT IN AN ORGANIZED HEALTH CARE EDUCATION/TRAINING PROGRAM

## 2025-04-21 PROCEDURE — 80069 RENAL FUNCTION PANEL: CPT | Performed by: PHYSICIAN ASSISTANT

## 2025-04-21 PROCEDURE — 2500000001 HC RX 250 WO HCPCS SELF ADMINISTERED DRUGS (ALT 637 FOR MEDICARE OP): Performed by: STUDENT IN AN ORGANIZED HEALTH CARE EDUCATION/TRAINING PROGRAM

## 2025-04-21 PROCEDURE — 1200000002 HC GENERAL ROOM WITH TELEMETRY DAILY

## 2025-04-21 PROCEDURE — 2500000001 HC RX 250 WO HCPCS SELF ADMINISTERED DRUGS (ALT 637 FOR MEDICARE OP): Performed by: PHYSICIAN ASSISTANT

## 2025-04-21 PROCEDURE — 80197 ASSAY OF TACROLIMUS: CPT | Performed by: PHYSICIAN ASSISTANT

## 2025-04-21 PROCEDURE — 8010000001 HC DIALYSIS - HEMODIALYSIS PER DAY

## 2025-04-21 PROCEDURE — 99233 SBSQ HOSP IP/OBS HIGH 50: CPT | Performed by: PHYSICIAN ASSISTANT

## 2025-04-21 PROCEDURE — 82330 ASSAY OF CALCIUM: CPT | Performed by: PHYSICIAN ASSISTANT

## 2025-04-21 PROCEDURE — 83735 ASSAY OF MAGNESIUM: CPT | Performed by: PHYSICIAN ASSISTANT

## 2025-04-21 RX ORDER — VALGANCICLOVIR 450 MG/1
450 TABLET, FILM COATED ORAL
Status: DISCONTINUED | OUTPATIENT
Start: 2025-04-21 | End: 2025-04-26 | Stop reason: HOSPADM

## 2025-04-21 RX ORDER — OMEPRAZOLE 40 MG/1
40 CAPSULE, DELAYED RELEASE ORAL DAILY
COMMUNITY
End: 2025-04-26 | Stop reason: HOSPADM

## 2025-04-21 RX ORDER — INSULIN LISPRO 100 [IU]/ML
6 INJECTION, SOLUTION INTRAVENOUS; SUBCUTANEOUS
Status: DISCONTINUED | OUTPATIENT
Start: 2025-04-21 | End: 2025-04-22

## 2025-04-21 RX ORDER — ATORVASTATIN CALCIUM 80 MG/1
80 TABLET, FILM COATED ORAL DAILY
Status: DISCONTINUED | OUTPATIENT
Start: 2025-04-21 | End: 2025-04-26 | Stop reason: HOSPADM

## 2025-04-21 RX ORDER — TIRZEPATIDE 2.5 MG/.5ML
2.5 INJECTION, SOLUTION SUBCUTANEOUS
COMMUNITY

## 2025-04-21 RX ORDER — DEXTROSE 50 % IN WATER (D50W) INTRAVENOUS SYRINGE
12.5
Status: CANCELLED | OUTPATIENT
Start: 2025-04-21

## 2025-04-21 RX ORDER — ACETAMINOPHEN 325 MG/1
650 TABLET ORAL ONCE
Status: COMPLETED | OUTPATIENT
Start: 2025-04-21 | End: 2025-04-21

## 2025-04-21 RX ORDER — TACROLIMUS 1 MG/1
3 CAPSULE ORAL
Status: DISCONTINUED | OUTPATIENT
Start: 2025-04-21 | End: 2025-04-26 | Stop reason: HOSPADM

## 2025-04-21 RX ORDER — DEXTROSE 50 % IN WATER (D50W) INTRAVENOUS SYRINGE
25
Status: CANCELLED | OUTPATIENT
Start: 2025-04-21

## 2025-04-21 RX ORDER — SODIUM BICARBONATE 650 MG/1
650 TABLET ORAL 2 TIMES DAILY
Status: DISCONTINUED | OUTPATIENT
Start: 2025-04-21 | End: 2025-04-24

## 2025-04-21 RX ORDER — HEPARIN SODIUM 5000 [USP'U]/ML
5000 INJECTION, SOLUTION INTRAVENOUS; SUBCUTANEOUS EVERY 8 HOURS SCHEDULED
Status: DISCONTINUED | OUTPATIENT
Start: 2025-04-21 | End: 2025-04-23

## 2025-04-21 RX ORDER — DIPHENHYDRAMINE HCL 25 MG
50 CAPSULE ORAL ONCE
Status: COMPLETED | OUTPATIENT
Start: 2025-04-21 | End: 2025-04-21

## 2025-04-21 RX ADMIN — HEPARIN SODIUM 250 MG: 1000 INJECTION INTRAVENOUS; SUBCUTANEOUS at 15:47

## 2025-04-21 RX ADMIN — OXYCODONE 10 MG: 5 TABLET ORAL at 06:20

## 2025-04-21 RX ADMIN — METHYLPREDNISOLONE SODIUM SUCCINATE 125 MG: 125 INJECTION, POWDER, FOR SOLUTION INTRAMUSCULAR; INTRAVENOUS at 14:56

## 2025-04-21 RX ADMIN — PANTOPRAZOLE SODIUM 40 MG: 40 TABLET, DELAYED RELEASE ORAL at 15:35

## 2025-04-21 RX ADMIN — INSULIN HUMAN 20 UNITS: 100 INJECTION, SUSPENSION SUBCUTANEOUS at 15:35

## 2025-04-21 RX ADMIN — INSULIN LISPRO 6 UNITS: 100 INJECTION, SOLUTION INTRAVENOUS; SUBCUTANEOUS at 16:42

## 2025-04-21 RX ADMIN — ATORVASTATIN CALCIUM 80 MG: 80 TABLET, FILM COATED ORAL at 21:12

## 2025-04-21 RX ADMIN — TACROLIMUS 3 MG: 1 CAPSULE ORAL at 18:33

## 2025-04-21 RX ADMIN — OXYCODONE 10 MG: 5 TABLET ORAL at 14:42

## 2025-04-21 RX ADMIN — HEPARIN SODIUM 5000 UNITS: 5000 INJECTION, SOLUTION INTRAVENOUS; SUBCUTANEOUS at 14:56

## 2025-04-21 RX ADMIN — ACETAMINOPHEN 650 MG: 325 TABLET, FILM COATED ORAL at 21:12

## 2025-04-21 RX ADMIN — VALGANCICLOVIR HYDROCHLORIDE 450 MG: 450 TABLET ORAL at 16:06

## 2025-04-21 RX ADMIN — SENNOSIDES 8.6 MG: 8.6 TABLET, FILM COATED ORAL at 21:12

## 2025-04-21 RX ADMIN — MYCOPHENOLATE MOFETIL 1000 MG: 250 CAPSULE ORAL at 06:17

## 2025-04-21 RX ADMIN — SODIUM ZIRCONIUM CYCLOSILICATE 10 G: 10 POWDER, FOR SUSPENSION ORAL at 03:14

## 2025-04-21 RX ADMIN — OXYCODONE 10 MG: 5 TABLET ORAL at 23:26

## 2025-04-21 RX ADMIN — ACETAMINOPHEN 650 MG: 325 TABLET ORAL at 14:56

## 2025-04-21 RX ADMIN — FUROSEMIDE 80 MG: 10 INJECTION, SOLUTION INTRAVENOUS at 15:30

## 2025-04-21 RX ADMIN — ACETAMINOPHEN 650 MG: 325 TABLET, FILM COATED ORAL at 06:17

## 2025-04-21 RX ADMIN — PANTOPRAZOLE SODIUM 40 MG: 40 TABLET, DELAYED RELEASE ORAL at 06:21

## 2025-04-21 RX ADMIN — CALCIUM GLUCONATE 3 G: 98 INJECTION, SOLUTION INTRAVENOUS at 23:26

## 2025-04-21 RX ADMIN — FUROSEMIDE 80 MG: 10 INJECTION, SOLUTION INTRAVENOUS at 23:26

## 2025-04-21 RX ADMIN — SODIUM BICARBONATE 650 MG: 650 TABLET ORAL at 21:12

## 2025-04-21 RX ADMIN — INSULIN LISPRO 4 UNITS: 100 INJECTION, SOLUTION INTRAVENOUS; SUBCUTANEOUS at 21:12

## 2025-04-21 RX ADMIN — DIPHENHYDRAMINE HYDROCHLORIDE 50 MG: 25 CAPSULE ORAL at 14:55

## 2025-04-21 RX ADMIN — TACROLIMUS 1 MG: 1 CAPSULE ORAL at 06:17

## 2025-04-21 RX ADMIN — MYCOPHENOLATE MOFETIL 1000 MG: 250 CAPSULE ORAL at 18:33

## 2025-04-21 RX ADMIN — CLOTRIMAZOLE 10 MG: 10 LOZENGE ORAL at 18:33

## 2025-04-21 RX ADMIN — HEPARIN SODIUM 5000 UNITS: 5000 INJECTION, SOLUTION INTRAVENOUS; SUBCUTANEOUS at 21:12

## 2025-04-21 ASSESSMENT — COGNITIVE AND FUNCTIONAL STATUS - GENERAL
DRESSING REGULAR LOWER BODY CLOTHING: A LITTLE
DAILY ACTIVITIY SCORE: 23
MOBILITY SCORE: 24

## 2025-04-21 ASSESSMENT — PAIN SCALES - GENERAL
PAINLEVEL_OUTOF10: 0 - NO PAIN
PAINLEVEL_OUTOF10: 10 - WORST POSSIBLE PAIN
PAINLEVEL_OUTOF10: 9
PAINLEVEL_OUTOF10: 10 - WORST POSSIBLE PAIN
PAINLEVEL_OUTOF10: 9
PAINLEVEL_OUTOF10: 5 - MODERATE PAIN

## 2025-04-21 ASSESSMENT — PAIN - FUNCTIONAL ASSESSMENT
PAIN_FUNCTIONAL_ASSESSMENT: 0-10
PAIN_FUNCTIONAL_ASSESSMENT: 0-10
PAIN_FUNCTIONAL_ASSESSMENT: NO/DENIES PAIN

## 2025-04-21 ASSESSMENT — ENCOUNTER SYMPTOMS
RHINORRHEA: 0
DIZZINESS: 0
FATIGUE: 1
AGITATION: 0
COUGH: 0
FACIAL ASYMMETRY: 0
NECK STIFFNESS: 0
ABDOMINAL DISTENTION: 0
PALPITATIONS: 0
POLYDIPSIA: 0
POLYPHAGIA: 0
HEMATURIA: 0
SHORTNESS OF BREATH: 0
NECK PAIN: 0
BLOOD IN STOOL: 0
HALLUCINATIONS: 0
PHOTOPHOBIA: 0
DYSURIA: 0
SORE THROAT: 0
CHILLS: 0

## 2025-04-21 ASSESSMENT — PAIN DESCRIPTION - LOCATION: LOCATION: ABDOMEN

## 2025-04-21 ASSESSMENT — PAIN DESCRIPTION - ORIENTATION: ORIENTATION: RIGHT;LOWER

## 2025-04-21 NOTE — Clinical Note
Per EV Medicare A a/e 04/01/22 & Medicare B a/e 02/01/25.  Per EV BCBS is active and secondary.  TFC appointment is not needed.

## 2025-04-21 NOTE — ASSESSMENT & PLAN NOTE
History Of Present Illness  Tanmay Mcneill is a 39 y.o. male presenting with PMHx significant for ESRD 2/2 T2DM, as well as HTN, asthma, arthritis, and obesity who presents for  donor kidney transplant.      Diabetes History  Type of diabetes: 2  Year diagnosed or age: several years ago, first emr mention in 2018  Hospitalizations for DKA or HHS: none  Complications: ESRD now s/p DDKT, obesity  Seen by PCP or Endocrinology: PCP  Frequency of glucose checks: 0  Glucose review: no home record  Frequency of Hypoglycemia: none  Hypoglycemia unawareness: no  Severe hypoglycemia requiring assistance from others:  no     Home Medications  GLP-1: mounjaro 2.5mg x5 weeks so far  CGM: none  Previous meds: ozempic (GI upset), glargine 8u/day                Hemoglobin A1C   Date/Time Value Ref Range Status   2025 10:54 AM 7.8 (H) 4.3 - 5.6 % Final       Comment:       American Diabetes Association guidelines indicate that patients with HgbA1c in the range 5.7-6.4% are at increased risk for development of diabetes, and intervention by lifestyle modification may be beneficial. HgbA1c greater or equal to 6.5% is considered diagnostic of diabetes.   2024 02:36 PM 7.2 (H) see below % Final

## 2025-04-21 NOTE — NURSING NOTE
.Report to Receiving RN:    Report To: RAELY Payne  Time Report Called: 1140  Hand-Off Communication: Pt tolerated HD well with no concern. Fluid remove 2.5 Liter Post /64 HR 85  Complications During Treatment: No  Ultrafiltration Treatment: No  Medications Administered During Dialysis: No  Blood Products Administered During Dialysis: No  Labs Sent During Dialysis: N/A  Heparin Drip Rate Changes: No  Dialysis Catheter Dressing: AVF  Last Dressing Change: N/A

## 2025-04-21 NOTE — PROGRESS NOTES
04/21/25 1150   Discharge Planning   Living Arrangements Alone   Support Systems Family members   Assistance Needed None   Type of Residence Private residence   Home or Post Acute Services In home services   Type of Home Care Services Home nursing visits;Home OT;Home PT   Expected Discharge Disposition Home H   Does the patient need discharge transport arranged? Yes   RoundTrip coordination needed? Yes   Has discharge transport been arranged? No   Financial Resource Strain   How hard is it for you to pay for the very basics like food, housing, medical care, and heating? Not hard   Housing Stability   In the last 12 months, was there a time when you were not able to pay the mortgage or rent on time? N   Transportation Needs   In the past 12 months, has lack of transportation kept you from medical appointments or from getting medications? no       DC Planning:  Went in and met with the pt, confirmed demographics.     Transitional Care Coordination Progress Note:  Patient discussed during interdisciplinary rounds.     Plan per Medical/Surgical team:    Home Care choice for home going needs, [facility].  Pt Needs: PT/OT/RN for olena.   Requested [facility] referral.       Discharge disposition: [facility], [facility]    Potential Barriers: None    ADOD:  2/23    This TCC will continue to follow for home going needs and safe DC plan.      Karin Bautista. ARELY. TCC.

## 2025-04-21 NOTE — CONSULTS
Nutrition Note:     Pt is s/p DDKT 4/19/25. Currently on a carb controlled, renal diet. K 5.0, Phos 7.3, POCT Gluc > 200.     Introduced self/role to pt. RN providing care at bedside. Provided educational handout on Food Safety. Will follow up with pt on later date for further nutrition education.

## 2025-04-21 NOTE — PROGRESS NOTES
Tanmay Mcneill is a 39 y.o. male on day 3 of admission presenting with ESRD (end stage renal disease) (Multi).    Subjective   Pt seen during HD.   Discussed insulin plan changes.   Agreeable to CGMMallory 3.     I have reviewed histories, allergies and medications have been reviewed and there are no changes       Objective   Review of Systems   Constitutional:  Positive for fatigue. Negative for chills.   HENT:  Negative for rhinorrhea and sore throat.    Eyes:  Negative for photophobia and visual disturbance.   Respiratory:  Negative for cough and shortness of breath.    Cardiovascular:  Negative for chest pain and palpitations.   Gastrointestinal:  Negative for abdominal distention and blood in stool.   Endocrine: Negative for polydipsia, polyphagia and polyuria.   Genitourinary:  Negative for dysuria and hematuria.   Musculoskeletal:  Negative for neck pain and neck stiffness.   Neurological:  Negative for dizziness and facial asymmetry.   Psychiatric/Behavioral:  Negative for agitation and hallucinations.      Physical Exam  Constitutional:       General: He is not in acute distress.     Appearance: He is not ill-appearing.   HENT:      Head: Normocephalic.   Eyes:      Extraocular Movements: Extraocular movements intact.   Cardiovascular:      Rate and Rhythm: Normal rate and regular rhythm.      Pulses: Normal pulses.      Heart sounds: Normal heart sounds. No murmur heard.  Pulmonary:      Effort: Pulmonary effort is normal. No respiratory distress.      Breath sounds: Normal breath sounds.   Abdominal:      General: Abdomen is flat.      Palpations: Abdomen is soft.   Musculoskeletal:         General: Normal range of motion.   Skin:     General: Skin is warm and dry.   Neurological:      General: No focal deficit present.      Mental Status: He is alert and oriented to person, place, and time.   Psychiatric:         Mood and Affect: Mood normal.         Behavior: Behavior normal.         Thought Content:  "Thought content normal.         Judgment: Judgment normal.         Last Recorded Vitals  Blood pressure 141/51, pulse 78, temperature 36 °C (96.8 °F), temperature source Temporal, resp. rate 17, height 1.854 m (6' 1\"), weight (!) 160 kg (352 lb 15.3 oz), SpO2 95%.  Intake/Output last 3 Shifts:  I/O last 3 completed shifts:  In: 462.5 (2.9 mL/kg) [I.V.:462.5 (2.9 mL/kg)]  Out: 354.5 (2.2 mL/kg) [Urine:152 (0 mL/kg/hr); Drains:202.5]  Weight: 160.1 kg     Relevant Results  Results from last 7 days   Lab Units 25  0528 25  2135 25  1629 25  1322 25  1232 25  0640 25  0524 25  0230 25  0050 25  2246 25  1923   POCT GLUCOSE mg/dL  --  229* 252*  --  197* 186*  --  224*  --    < >  --    GLUCOSE mg/dL 233*  --   --  201*  --   --  172*  --  197*  --  212*    < > = values in this interval not displayed.     Scheduled medications  Scheduled Medications[1]  Continuous medications  Continuous Medications[2]  PRN medications  PRN Medications[3]                 Assessment & Plan  ESRD (end stage renal disease) (Multi)    Type 2 diabetes mellitus with hyperglycemia, with long-term current use of insulin  History Of Present Illness  Tanmay Mcneill is a 39 y.o. male presenting with PMHx significant for ESRD 2/2 T2DM, as well as HTN, asthma, arthritis, and obesity who presents for  donor kidney transplant.      Diabetes History  Type of diabetes: 2  Year diagnosed or age: several years ago, first emr mention in 2018  Hospitalizations for DKA or HHS: none  Complications: ESRD now s/p DDKT, obesity  Seen by PCP or Endocrinology: PCP  Frequency of glucose checks: 0  Glucose review: no home record  Frequency of Hypoglycemia: none  Hypoglycemia unawareness: no  Severe hypoglycemia requiring assistance from others:  no     Home Medications  GLP-1: mounjaro 2.5mg x5 weeks so far  CGM: none  Previous meds: ozempic (GI upset), glargine 8u/day                Hemoglobin " A1C   Date/Time Value Ref Range Status   02/27/2025 10:54 AM 7.8 (H) 4.3 - 5.6 % Final       Comment:       American Diabetes Association guidelines indicate that patients with HgbA1c in the range 5.7-6.4% are at increased risk for development of diabetes, and intervention by lifestyle modification may be beneficial. HgbA1c greater or equal to 6.5% is considered diagnostic of diabetes.   04/02/2024 02:36 PM 7.2 (H) see below % Final      Steroid-induced hyperglycemia    PLAN  Steroids:  methylpred 500mg intra-op  methylpred 250 mg x1d  methylpred 125mg x1d  methylpred 60mg x1d  pred 20mg ongoing     Nutrition: Carb restricted     - continue NPH 20u with each solumedrol dose     If NPO: continue     If steroid is held: hold     - increase lispro 6u with meals plus scale      If NPO or glucose <90mg/dL within 1hr before meal: hold     - continue lispro corrective scale #2 ACHS, adjust to scale #2 q4hr if persistently hyperglycemic >250mg/dL   = 0u  151-200 = 2u  201-250 = 4u  251-300 = 6u  301-350 = 8u  351-400 = 10u     -Accuchecks q4hr   - Goal BG <200  -Hypoglycemia protocol  -Will continue to follow and titrate insulin accordingly    Discharge planning:   [] patient may expect to discharge home on NPH and lispro, final doses TBD by titration  [x]will provide CGM sample prior to discharge - will need leandro hema downloaded  []will enroll pt in  pharmacy platinum plan program  [x]follow up with Disha Maynard PA-C in PTDM clinic, appointment request sent on 4/20/25        I spent 35 minutes in the professional and overall care of this patient.    LATIA Hernandes-S2  Disha Maynard PA-C         [1] acetaminophen, 650 mg, oral, q6h ANURADHA  acetaminophen, 650 mg, oral, Once  antithymocyte globulin (rabbit), 1.5 mg/kg, intravenous, Once  atorvastatin, 80 mg, oral, Daily  calcium gluconate, 3 g, intravenous, Once  ceFAZolin, 2 g, intravenous, Once  clotrimazole, 10 mg, Mouth/Throat, TID after  meals  diphenhydrAMINE, 50 mg, oral, Once  furosemide, 80 mg, intravenous, q8h  heparin (porcine), 5,000 Units, subcutaneous, q8h ANURADHA  insulin lispro, 0-10 Units, subcutaneous, Before meals & nightly  insulin lispro, 6 Units, subcutaneous, TID AC  insulin NPH (Isophane), 20 Units, subcutaneous, q AM  methylPREDNISolone sodium succinate (PF), 125 mg, intravenous, Once  [START ON 4/22/2025] methylPREDNISolone sodium succinate (PF), 60 mg, intravenous, Once  mycophenolate, 1,000 mg, oral, q12h ANURADHA  pantoprazole, 40 mg, oral, BID AC  polyethylene glycol, 17 g, oral, Daily  [START ON 4/23/2025] predniSONE, 20 mg, oral, Daily  sennosides, 1 tablet, oral, BID  sodium bicarbonate, 650 mg, oral, BID  tacrolimus, 1 mg, oral, q12h ANURADHA  valGANciclovir, 450 mg, oral, q48h     [2]    [3] PRN medications: dextrose, dextrose, glucagon, glucagon, glucagon, HYDROmorphone, naloxone, ondansetron **OR** ondansetron, oxyCODONE, oxyCODONE, oxygen

## 2025-04-21 NOTE — SIGNIFICANT EVENT
04/21/25 1540   Patient Interaction   Organ Kidney   Type of Interaction Morning rounds   Interdisciplinary Rounds   Attendance Surgeon;Physician;NOEL;Pharmacist     Transplant Surgery Multidisciplinary Team Note    Tanmay Mcneill is a 39 y.o. male   POD#2 from a Kidney from a DCD donor. His post operative complications: Need for dialysis    24 Hour Events  1. No acute events    Last Recorded Vitals  Visit Vitals  /69   Pulse 80   Temp 36 °C (96.8 °F) (Temporal)   Resp 17      Intake/Output last 3 Shifts:    Intake/Output Summary (Last 24 hours) at 4/21/2025 1540  Last data filed at 4/21/2025 1130  Gross per 24 hour   Intake 400 ml   Output 135 ml   Net 265 ml      Vitals:    04/21/25 0500   Weight: (!) 160 kg (352 lb 15.3 oz)        Assessment/Plan  Kidney allograft function  -DGF   -HD today  -Doppler US today    Immunosuppression/PPX  -thymo 4.5mg/kg  -tac, mmf 1gm BID, steroid taper  -valcyt- hold bactrim for hyperkalemia    Hyperkalemia--> improved to 5.1  -Lokelma stopped    PT/OT  Subcutaneous heparing 5k TID         Principal Problem:    Management after organ transplant  Active Problems:  Problem List[1]     Immunosuppression reviewed and adjusted       Induction: Steroid and Thymoglobulin Full Dose       Tacrolimus goal 8-10 ng/mL. Current dose 1 mg BID         MMF 1000 mg po BID       Solumedrol taper  DVT prophylaxis SCDS and subcutaneous heparin 5000 TID  PT/OT  Diet: general--> low K+, low phos, DM  Anticipated discharge 3-5 days    Emily Lora, APRN-CNP             [1]   Patient Active Problem List  Diagnosis    Acid reflux    Arteriovenous graft for hemodialysis in place, primary    Asthma    CKD (chronic kidney disease), stage V (Multi)    Chronic pain of right knee    Dyslipidemia    Hypertension    Morbid obesity (Multi)    Osteoarthritis    Snoring    Type 2 diabetes mellitus    Vitamin D deficiency    Transplant    Preop cardiovascular exam    ESRD (end stage renal disease) (Multi)     Type 2 diabetes mellitus with hyperglycemia, with long-term current use of insulin    Steroid-induced hyperglycemia

## 2025-04-21 NOTE — PROGRESS NOTES
Physical Therapy    Therapy Communication Note    Patient Name: Tanmay Mcneill  MRN: [id number]  Today's Date: 4/21/2025       Discipline: Physical Therapy      PT Missed Visit: Yes  Missed Visit Reason: Other (Comment) (pt off floor)        Carol Blancas, PT

## 2025-04-21 NOTE — PROGRESS NOTES
Pharmacist Post-Transplant Note    The Clinical Transplant Pharmacist is aware that Tanmay Mcneill has been admitted and has undergone kidney transplantation. A transplant pharmacist will be rounding with the multidisciplinary transplant team and making recommendations on his medication regimen.     The patient's medications have been reviewed in conjunction with the multidisciplinary transplant team. The pharmacist is in agreement with the plan of care for medications at this time.    Patient and donor factors were screened to determine the appropriate post-transplant protocol and current immunosuppression plan includes:    Induction Regimen:  Antithymocyte globulin    Maintenance Regimen:  Tacrolimus, Mycophenolate mofetil, and Methylprednisolone/Prednisone Taper    Maintenance immunosuppression and anti-infective prophylaxis will begin according to protocol. Home medications will begin when the patient is clinically stable. Of note, CMV D-/R+ so patient will require 3 months of CMV prophylaxis with valganciclovir per protocol.    Armani Maldonado   Pharmacy Student   20-Jul-2023

## 2025-04-21 NOTE — NURSING NOTE
Report from Sending RN:    Report From: Lucie Frausto RN)  Recent Surgery of Procedure: No  Baseline Level of Consciousness (LOC): a/o x 4  Oxygen Use: No  Type: none  Diabetic: Yes, 229  Last BP Med Given Day of Dialysis: none  Last Pain Med Given: tylenol 650 mg  Lab Tests to be Obtained with Dialysis: No  Blood Transfusion to be Given During Dialysis: No  Available IV Access: No  Medications to be Administered During Dialysis: No  Continuous IV Infusion Running: No  Restraints on Currently or in the Last 24 Hours: No  Hand-Off Communication: no acute overnight or morning events; pt vs are wnl 1 hour prior to arrival to the unit. Pt is stable; pt is able to stand with assistance and travels by dialysis stretcher. Pt may come down to the unit without telemetry; pt is a full code. Shantel Interiano RN.  Dialysis Catheter Dressing: right  upper arm AVF  Last Dressing Change: will assess when pt arrives to the unit

## 2025-04-21 NOTE — PROGRESS NOTES
Per EV Medicare A a/e 04/01/22 & Medicare B a/e 02/01/25.  Per EV BCBS is active and secondary.  Faxed OP note & Reg sheet to  as an FYI.

## 2025-04-21 NOTE — PROGRESS NOTES
"Tanmay Mcneill is a 39 y.o. male on day 3 of admission presenting with ESRD (end stage renal disease) (Multi).    HD today.   Recorded UOP 100cc.   No acute issues overnight.    Scheduled medications  Scheduled Medications[1]  Continuous medications  Continuous Medications[2]  PRN medications  PRN Medications[3]      Last Recorded Vitals  Blood pressure 144/69, pulse 80, temperature 36 °C (96.8 °F), temperature source Temporal, resp. rate 17, height 1.854 m (6' 1\"), weight (!) 160 kg (352 lb 15.3 oz), SpO2 95%.  Intake/Output last 3 Shifts:  I/O last 3 completed shifts:  In: 462.5 (2.9 mL/kg) [I.V.:462.5 (2.9 mL/kg)]  Out: 354.5 (2.2 mL/kg) [Urine:152 (0 mL/kg/hr); Drains:202.5]  Weight: 160.1 kg     A&ox3, no distress, pleasant  MMM, no lesions  Lungs with equal air entry, no added sounds  Rrr, no m/r/g  Abd soft, nt, nd  No allograft tenderness  No edema b/l    Results for orders placed or performed during the hospital encounter of 04/18/25 (from the past 24 hours)   POCT GLUCOSE   Result Value Ref Range    POCT Glucose 252 (H) 74 - 99 mg/dL   POCT GLUCOSE   Result Value Ref Range    POCT Glucose 229 (H) 74 - 99 mg/dL   Calcium, Ionized   Result Value Ref Range    POCT Calcium, Ionized 0.94 (L) 1.1 - 1.33 mmol/L   Magnesium   Result Value Ref Range    Magnesium 2.12 1.60 - 2.40 mg/dL   Renal Function Panel   Result Value Ref Range    Glucose 233 (H) 74 - 99 mg/dL    Sodium 128 (L) 136 - 145 mmol/L    Potassium 5.1 3.5 - 5.3 mmol/L    Chloride 92 (L) 98 - 107 mmol/L    Bicarbonate 19 (L) 21 - 32 mmol/L    Anion Gap 22 (H) 10 - 20 mmol/L    Urea Nitrogen 78 (H) 6 - 23 mg/dL    Creatinine 13.39 (H) 0.50 - 1.30 mg/dL    eGFR 4 (L) >60 mL/min/1.73m*2    Calcium 8.6 8.6 - 10.6 mg/dL    Phosphorus 7.3 (H) 2.5 - 4.9 mg/dL    Albumin 3.7 3.4 - 5.0 g/dL   CBC and Auto Differential   Result Value Ref Range    WBC 9.7 4.4 - 11.3 x10*3/uL    nRBC 0.0 0.0 - 0.0 /100 WBCs    RBC 3.80 (L) 4.50 - 5.90 x10*6/uL    Hemoglobin 9.1 " (L) 13.5 - 17.5 g/dL    Hematocrit 29.0 (L) 41.0 - 52.0 %    MCV 76 (L) 80 - 100 fL    MCH 23.9 (L) 26.0 - 34.0 pg    MCHC 31.4 (L) 32.0 - 36.0 g/dL    RDW 17.0 (H) 11.5 - 14.5 %    Platelets 147 (L) 150 - 450 x10*3/uL    Neutrophils % 90.8 40.0 - 80.0 %    Immature Granulocytes %, Automated 0.5 0.0 - 0.9 %    Lymphocytes % 1.9 13.0 - 44.0 %    Monocytes % 6.8 2.0 - 10.0 %    Eosinophils % 0.0 0.0 - 6.0 %    Basophils % 0.0 0.0 - 2.0 %    Neutrophils Absolute 8.81 (H) 1.20 - 7.70 x10*3/uL    Immature Granulocytes Absolute, Automated 0.05 0.00 - 0.70 x10*3/uL    Lymphocytes Absolute 0.18 (L) 1.20 - 4.80 x10*3/uL    Monocytes Absolute 0.66 0.10 - 1.00 x10*3/uL    Eosinophils Absolute 0.00 0.00 - 0.70 x10*3/uL    Basophils Absolute 0.00 0.00 - 0.10 x10*3/uL   Tacrolimus   Result Value Ref Range    Tacrolimus  <2.0 <=15.0 ng/mL       Assessment and Plan:    39 y.o.  male with ESRD sec to DM2, on HD since 1/19/2022 ([facility], MWF, [facility], Dr. Rhodes), HTN, obesity, asthma, arthritis currently s/p DDKT on 4/19/2025.  Anuric versus oliguric at baseline.  Been on dialysis for 3 years through right upper extremity AV fistula     Donor kidney info:  DCD, was lung tx recipient, Terminal Cr  1.08, KDPI: 48 %, PRA: 19 %, CMV: D-/R+, EBV: D+/R+, Toxo: Donor Negative, HCV Ab/MELISSA: Negative, HBcAB: Negative,  HBsAg/HBV MELISSA: Negative. Full cava used as venous extension 2/2 depth    Allograft function:  - Expecting DGF  - HD today for clearance and volume management. Will eval for RRT needs daily.   - on Lasix 80 mg IV q8  - Ultrasound transplant unremarkable.  - 7-day Pimentel due to difficult Pimentel placement  - acceptable serum K, CO2. hypoNa noted- likely volume related  - Hb ~9, acceptable. Monitor and transfuse as needed.   - Hemodynamics: Stable Bps. Monitor and titrate meds as indicate.    - CKD-MBD: Ca, Phos acceptable. Replete Mg as indicated  - avoid potential nephrotoxins. Dose meds for CrCl  - WBC, plt stable      Immunosuppression:  - Total time of dose planned is 4.5 mg/kg. Dose #3 today  - On Tac, MMF 1g q12, pred taper per protocol. Goal FK 8-10.  - Ppx: Bactrim (not started yet), Valcyte (CMV -/+), Mycelex troches.     Rest of the management per primary team. Will follow    Juan Miguel Feliz MD         [1] acetaminophen, 650 mg, oral, q6h ANUARDHA  acetaminophen, 650 mg, oral, Once  antithymocyte globulin (rabbit), 1.5 mg/kg, intravenous, Once  atorvastatin, 80 mg, oral, Daily  calcium gluconate, 3 g, intravenous, Once  ceFAZolin, 2 g, intravenous, Once  clotrimazole, 10 mg, Mouth/Throat, TID after meals  diphenhydrAMINE, 50 mg, oral, Once  furosemide, 80 mg, intravenous, q8h  heparin (porcine), 5,000 Units, subcutaneous, q8h ANURADHA  insulin lispro, 0-10 Units, subcutaneous, Before meals & nightly  insulin lispro, 6 Units, subcutaneous, TID AC  insulin NPH (Isophane), 20 Units, subcutaneous, q AM  methylPREDNISolone sodium succinate (PF), 125 mg, intravenous, Once  [START ON 4/22/2025] methylPREDNISolone sodium succinate (PF), 60 mg, intravenous, Once  mycophenolate, 1,000 mg, oral, q12h ANURADHA  pantoprazole, 40 mg, oral, BID AC  polyethylene glycol, 17 g, oral, Daily  [START ON 4/23/2025] predniSONE, 20 mg, oral, Daily  sennosides, 1 tablet, oral, BID  sodium bicarbonate, 650 mg, oral, BID  tacrolimus, 1 mg, oral, q12h ANURADHA  valGANciclovir, 450 mg, oral, q48h  [2]    [3] PRN medications: dextrose, dextrose, glucagon, glucagon, glucagon, HYDROmorphone, naloxone, ondansetron **OR** ondansetron, oxyCODONE, oxyCODONE, oxygen

## 2025-04-22 LAB
ALBUMIN SERPL BCP-MCNC: 3.9 G/DL (ref 3.4–5)
ANION GAP SERPL CALC-SCNC: 20 MMOL/L (ref 10–20)
BASOPHILS # BLD AUTO: 0.01 X10*3/UL (ref 0–0.1)
BASOPHILS NFR BLD AUTO: 0.2 %
BUN SERPL-MCNC: 59 MG/DL (ref 6–23)
CA-I BLD-SCNC: 1.11 MMOL/L (ref 1.1–1.33)
CALCIUM SERPL-MCNC: 9.4 MG/DL (ref 8.6–10.6)
CHLORIDE SERPL-SCNC: 91 MMOL/L (ref 98–107)
CO2 SERPL-SCNC: 24 MMOL/L (ref 21–32)
CREAT SERPL-MCNC: 9.65 MG/DL (ref 0.5–1.3)
EGFRCR SERPLBLD CKD-EPI 2021: 6 ML/MIN/1.73M*2
EOSINOPHIL # BLD AUTO: 0 X10*3/UL (ref 0–0.7)
EOSINOPHIL NFR BLD AUTO: 0 %
ERYTHROCYTE [DISTWIDTH] IN BLOOD BY AUTOMATED COUNT: 17.2 % (ref 11.5–14.5)
GLUCOSE BLD MANUAL STRIP-MCNC: 145 MG/DL (ref 74–99)
GLUCOSE BLD MANUAL STRIP-MCNC: 214 MG/DL (ref 74–99)
GLUCOSE BLD MANUAL STRIP-MCNC: 224 MG/DL (ref 74–99)
GLUCOSE BLD MANUAL STRIP-MCNC: 292 MG/DL (ref 74–99)
GLUCOSE SERPL-MCNC: 298 MG/DL (ref 74–99)
HCT VFR BLD AUTO: 30.9 % (ref 41–52)
HGB BLD-MCNC: 9.2 G/DL (ref 13.5–17.5)
IMM GRANULOCYTES # BLD AUTO: 0.04 X10*3/UL (ref 0–0.7)
IMM GRANULOCYTES NFR BLD AUTO: 0.7 % (ref 0–0.9)
LYMPHOCYTES # BLD AUTO: 0.21 X10*3/UL (ref 1.2–4.8)
LYMPHOCYTES NFR BLD AUTO: 3.9 %
MAGNESIUM SERPL-MCNC: 2.25 MG/DL (ref 1.6–2.4)
MCH RBC QN AUTO: 24 PG (ref 26–34)
MCHC RBC AUTO-ENTMCNC: 29.8 G/DL (ref 32–36)
MCV RBC AUTO: 81 FL (ref 80–100)
MONOCYTES # BLD AUTO: 0.64 X10*3/UL (ref 0.1–1)
MONOCYTES NFR BLD AUTO: 11.9 %
NEUTROPHILS # BLD AUTO: 4.47 X10*3/UL (ref 1.2–7.7)
NEUTROPHILS NFR BLD AUTO: 83.3 %
NRBC BLD-RTO: 0 /100 WBCS (ref 0–0)
PHOSPHATE SERPL-MCNC: 5.3 MG/DL (ref 2.5–4.9)
PLATELET # BLD AUTO: 106 X10*3/UL (ref 150–450)
POTASSIUM SERPL-SCNC: 4.2 MMOL/L (ref 3.5–5.3)
RBC # BLD AUTO: 3.83 X10*6/UL (ref 4.5–5.9)
SODIUM SERPL-SCNC: 131 MMOL/L (ref 136–145)
TACROLIMUS BLD-MCNC: 2.7 NG/ML
WBC # BLD AUTO: 5.4 X10*3/UL (ref 4.4–11.3)

## 2025-04-22 PROCEDURE — 83735 ASSAY OF MAGNESIUM: CPT | Performed by: PHYSICIAN ASSISTANT

## 2025-04-22 PROCEDURE — 99233 SBSQ HOSP IP/OBS HIGH 50: CPT | Performed by: INTERNAL MEDICINE

## 2025-04-22 PROCEDURE — 80197 ASSAY OF TACROLIMUS: CPT | Performed by: PHYSICIAN ASSISTANT

## 2025-04-22 PROCEDURE — RXMED WILLOW AMBULATORY MEDICATION CHARGE

## 2025-04-22 PROCEDURE — 99233 SBSQ HOSP IP/OBS HIGH 50: CPT | Performed by: NURSE PRACTITIONER

## 2025-04-22 PROCEDURE — 2500000002 HC RX 250 W HCPCS SELF ADMINISTERED DRUGS (ALT 637 FOR MEDICARE OP, ALT 636 FOR OP/ED): Performed by: PHYSICIAN ASSISTANT

## 2025-04-22 PROCEDURE — 82330 ASSAY OF CALCIUM: CPT | Performed by: PHYSICIAN ASSISTANT

## 2025-04-22 PROCEDURE — 97165 OT EVAL LOW COMPLEX 30 MIN: CPT | Mod: GO | Performed by: OCCUPATIONAL THERAPIST

## 2025-04-22 PROCEDURE — 1200000002 HC GENERAL ROOM WITH TELEMETRY DAILY

## 2025-04-22 PROCEDURE — 85025 COMPLETE CBC W/AUTO DIFF WBC: CPT | Performed by: PHYSICIAN ASSISTANT

## 2025-04-22 PROCEDURE — 2500000001 HC RX 250 WO HCPCS SELF ADMINISTERED DRUGS (ALT 637 FOR MEDICARE OP): Performed by: PHYSICIAN ASSISTANT

## 2025-04-22 PROCEDURE — 97161 PT EVAL LOW COMPLEX 20 MIN: CPT | Mod: GP

## 2025-04-22 PROCEDURE — 82947 ASSAY GLUCOSE BLOOD QUANT: CPT

## 2025-04-22 PROCEDURE — 2500000004 HC RX 250 GENERAL PHARMACY W/ HCPCS (ALT 636 FOR OP/ED)

## 2025-04-22 PROCEDURE — 80069 RENAL FUNCTION PANEL: CPT | Performed by: PHYSICIAN ASSISTANT

## 2025-04-22 PROCEDURE — 2500000001 HC RX 250 WO HCPCS SELF ADMINISTERED DRUGS (ALT 637 FOR MEDICARE OP)

## 2025-04-22 PROCEDURE — 2500000004 HC RX 250 GENERAL PHARMACY W/ HCPCS (ALT 636 FOR OP/ED): Performed by: PHYSICIAN ASSISTANT

## 2025-04-22 PROCEDURE — 2500000001 HC RX 250 WO HCPCS SELF ADMINISTERED DRUGS (ALT 637 FOR MEDICARE OP): Performed by: STUDENT IN AN ORGANIZED HEALTH CARE EDUCATION/TRAINING PROGRAM

## 2025-04-22 PROCEDURE — 36415 COLL VENOUS BLD VENIPUNCTURE: CPT | Performed by: PHYSICIAN ASSISTANT

## 2025-04-22 PROCEDURE — 2500000002 HC RX 250 W HCPCS SELF ADMINISTERED DRUGS (ALT 637 FOR MEDICARE OP, ALT 636 FOR OP/ED): Performed by: NURSE PRACTITIONER

## 2025-04-22 PROCEDURE — 2500000004 HC RX 250 GENERAL PHARMACY W/ HCPCS (ALT 636 FOR OP/ED): Mod: TB | Performed by: STUDENT IN AN ORGANIZED HEALTH CARE EDUCATION/TRAINING PROGRAM

## 2025-04-22 RX ORDER — DEXTROSE 50 % IN WATER (D50W) INTRAVENOUS SYRINGE
12.5
Status: DISCONTINUED | OUTPATIENT
Start: 2025-04-22 | End: 2025-04-26 | Stop reason: HOSPADM

## 2025-04-22 RX ORDER — INSULIN GLARGINE 100 [IU]/ML
20 INJECTION, SOLUTION SUBCUTANEOUS DAILY
Status: DISCONTINUED | OUTPATIENT
Start: 2025-04-23 | End: 2025-04-23

## 2025-04-22 RX ORDER — FUROSEMIDE 40 MG/1
80 TABLET ORAL
Status: DISCONTINUED | OUTPATIENT
Start: 2025-04-22 | End: 2025-04-26 | Stop reason: HOSPADM

## 2025-04-22 RX ORDER — ACETAMINOPHEN 325 MG/1
650 TABLET ORAL ONCE
Status: COMPLETED | OUTPATIENT
Start: 2025-04-22 | End: 2025-04-22

## 2025-04-22 RX ORDER — ACYCLOVIR 200 MG/1
400 CAPSULE ORAL 2 TIMES DAILY
Qty: 120 CAPSULE | Refills: 0 | Status: SHIPPED | OUTPATIENT
Start: 2025-04-22 | End: 2025-04-26 | Stop reason: SDUPTHER

## 2025-04-22 RX ORDER — SODIUM BICARBONATE 650 MG/1
650 TABLET ORAL 2 TIMES DAILY
Qty: 60 TABLET | Refills: 0 | Status: SHIPPED | OUTPATIENT
Start: 2025-04-22 | End: 2025-05-02 | Stop reason: ALTCHOICE

## 2025-04-22 RX ORDER — SULFAMETHOXAZOLE AND TRIMETHOPRIM 400; 80 MG/1; MG/1
1 TABLET ORAL DAILY
Status: DISCONTINUED | OUTPATIENT
Start: 2025-04-22 | End: 2025-04-26 | Stop reason: HOSPADM

## 2025-04-22 RX ORDER — CHOLECALCIFEROL (VITAMIN D3) 25 MCG
25 TABLET ORAL DAILY
Status: DISCONTINUED | OUTPATIENT
Start: 2025-04-22 | End: 2025-04-26 | Stop reason: HOSPADM

## 2025-04-22 RX ORDER — SULFAMETHOXAZOLE AND TRIMETHOPRIM 400; 80 MG/1; MG/1
1 TABLET ORAL DAILY
Qty: 30 TABLET | Refills: 0 | Status: SHIPPED | OUTPATIENT
Start: 2025-04-23 | End: 2025-04-26 | Stop reason: SDUPTHER

## 2025-04-22 RX ORDER — DIPHENHYDRAMINE HCL 25 MG
25 CAPSULE ORAL ONCE
Status: COMPLETED | OUTPATIENT
Start: 2025-04-22 | End: 2025-04-22

## 2025-04-22 RX ORDER — FUROSEMIDE 40 MG/1
80 TABLET ORAL
Qty: 120 TABLET | Refills: 0 | Status: SHIPPED | OUTPATIENT
Start: 2025-04-22 | End: 2025-05-23

## 2025-04-22 RX ORDER — TRAMADOL HYDROCHLORIDE 50 MG/1
25 TABLET ORAL EVERY 12 HOURS PRN
Status: DISCONTINUED | OUTPATIENT
Start: 2025-04-22 | End: 2025-04-24

## 2025-04-22 RX ORDER — TRAMADOL HYDROCHLORIDE 50 MG/1
50 TABLET ORAL EVERY 12 HOURS PRN
Status: DISCONTINUED | OUTPATIENT
Start: 2025-04-22 | End: 2025-04-24

## 2025-04-22 RX ORDER — INSULIN LISPRO 100 [IU]/ML
10 INJECTION, SOLUTION INTRAVENOUS; SUBCUTANEOUS
Status: DISCONTINUED | OUTPATIENT
Start: 2025-04-22 | End: 2025-04-23

## 2025-04-22 RX ORDER — DEXTROSE 50 % IN WATER (D50W) INTRAVENOUS SYRINGE
12.5
Status: DISCONTINUED | OUTPATIENT
Start: 2025-04-22 | End: 2025-04-22

## 2025-04-22 RX ORDER — DEXTROSE 50 % IN WATER (D50W) INTRAVENOUS SYRINGE
25
Status: DISCONTINUED | OUTPATIENT
Start: 2025-04-22 | End: 2025-04-26 | Stop reason: HOSPADM

## 2025-04-22 RX ORDER — CHOLECALCIFEROL (VITAMIN D3) 25 MCG
25 TABLET ORAL DAILY
Qty: 30 TABLET | Refills: 11 | Status: SHIPPED | OUTPATIENT
Start: 2025-04-23 | End: 2025-04-26 | Stop reason: SDUPTHER

## 2025-04-22 RX ADMIN — METHYLPREDNISOLONE SODIUM SUCCINATE 60 MG: 125 INJECTION, POWDER, FOR SOLUTION INTRAMUSCULAR; INTRAVENOUS at 08:05

## 2025-04-22 RX ADMIN — PANTOPRAZOLE SODIUM 40 MG: 40 TABLET, DELAYED RELEASE ORAL at 06:51

## 2025-04-22 RX ADMIN — CLOTRIMAZOLE 10 MG: 10 LOZENGE ORAL at 13:11

## 2025-04-22 RX ADMIN — INSULIN LISPRO 4 UNITS: 100 INJECTION, SOLUTION INTRAVENOUS; SUBCUTANEOUS at 11:16

## 2025-04-22 RX ADMIN — MYCOPHENOLATE MOFETIL 1000 MG: 250 CAPSULE ORAL at 06:50

## 2025-04-22 RX ADMIN — HEPARIN SODIUM 5000 UNITS: 5000 INJECTION, SOLUTION INTRAVENOUS; SUBCUTANEOUS at 21:38

## 2025-04-22 RX ADMIN — CLOTRIMAZOLE 10 MG: 10 LOZENGE ORAL at 18:08

## 2025-04-22 RX ADMIN — INSULIN LISPRO 6 UNITS: 100 INJECTION, SOLUTION INTRAVENOUS; SUBCUTANEOUS at 08:10

## 2025-04-22 RX ADMIN — MYCOPHENOLATE MOFETIL 1000 MG: 250 CAPSULE ORAL at 18:08

## 2025-04-22 RX ADMIN — INSULIN HUMAN 10 UNITS: 100 INJECTION, SUSPENSION SUBCUTANEOUS at 09:36

## 2025-04-22 RX ADMIN — FUROSEMIDE 80 MG: 40 TABLET ORAL at 16:12

## 2025-04-22 RX ADMIN — INSULIN HUMAN 10 UNITS: 100 INJECTION, SUSPENSION SUBCUTANEOUS at 21:38

## 2025-04-22 RX ADMIN — INSULIN LISPRO 6 UNITS: 100 INJECTION, SOLUTION INTRAVENOUS; SUBCUTANEOUS at 08:09

## 2025-04-22 RX ADMIN — ACETAMINOPHEN 650 MG: 325 TABLET ORAL at 11:15

## 2025-04-22 RX ADMIN — TACROLIMUS 3 MG: 1 CAPSULE ORAL at 18:08

## 2025-04-22 RX ADMIN — Medication 25 MCG: at 08:20

## 2025-04-22 RX ADMIN — DIPHENHYDRAMINE HYDROCHLORIDE 25 MG: 25 CAPSULE ORAL at 11:15

## 2025-04-22 RX ADMIN — SODIUM BICARBONATE 650 MG: 650 TABLET ORAL at 21:38

## 2025-04-22 RX ADMIN — INSULIN LISPRO 10 UNITS: 100 INJECTION, SOLUTION INTRAVENOUS; SUBCUTANEOUS at 11:16

## 2025-04-22 RX ADMIN — CLOTRIMAZOLE 10 MG: 10 LOZENGE ORAL at 08:06

## 2025-04-22 RX ADMIN — INSULIN HUMAN 20 UNITS: 100 INJECTION, SUSPENSION SUBCUTANEOUS at 08:10

## 2025-04-22 RX ADMIN — INSULIN LISPRO 10 UNITS: 100 INJECTION, SOLUTION INTRAVENOUS; SUBCUTANEOUS at 16:12

## 2025-04-22 RX ADMIN — HEPARIN SODIUM 5000 UNITS: 5000 INJECTION, SOLUTION INTRAVENOUS; SUBCUTANEOUS at 06:55

## 2025-04-22 RX ADMIN — TACROLIMUS 3 MG: 1 CAPSULE ORAL at 06:51

## 2025-04-22 RX ADMIN — SODIUM BICARBONATE 650 MG: 650 TABLET ORAL at 08:11

## 2025-04-22 RX ADMIN — SULFAMETHOXAZOLE AND TRIMETHOPRIM 1 TABLET: 400; 80 TABLET ORAL at 09:37

## 2025-04-22 RX ADMIN — HEPARIN SODIUM 50 MG: 1000 INJECTION INTRAVENOUS; SUBCUTANEOUS at 11:57

## 2025-04-22 RX ADMIN — ATORVASTATIN CALCIUM 80 MG: 80 TABLET, FILM COATED ORAL at 21:38

## 2025-04-22 RX ADMIN — PANTOPRAZOLE SODIUM 40 MG: 40 TABLET, DELAYED RELEASE ORAL at 16:12

## 2025-04-22 RX ADMIN — OXYCODONE 5 MG: 5 TABLET ORAL at 08:10

## 2025-04-22 RX ADMIN — HEPARIN SODIUM 5000 UNITS: 5000 INJECTION, SOLUTION INTRAVENOUS; SUBCUTANEOUS at 13:11

## 2025-04-22 RX ADMIN — TRAMADOL HYDROCHLORIDE 50 MG: 50 TABLET, COATED ORAL at 22:55

## 2025-04-22 RX ADMIN — FUROSEMIDE 80 MG: 10 INJECTION, SOLUTION INTRAVENOUS at 06:55

## 2025-04-22 RX ADMIN — INSULIN LISPRO 4 UNITS: 100 INJECTION, SOLUTION INTRAVENOUS; SUBCUTANEOUS at 21:38

## 2025-04-22 ASSESSMENT — ENCOUNTER SYMPTOMS
SHORTNESS OF BREATH: 0
RHINORRHEA: 0
FACIAL ASYMMETRY: 0
HEMATURIA: 0
COUGH: 0
DIZZINESS: 0
POLYDIPSIA: 0
AGITATION: 0
PALPITATIONS: 0
DYSURIA: 0
NECK PAIN: 0
ABDOMINAL DISTENTION: 0
BLOOD IN STOOL: 0
CHILLS: 0
SORE THROAT: 0
FATIGUE: 1
HALLUCINATIONS: 0
POLYPHAGIA: 0
PHOTOPHOBIA: 0
NECK STIFFNESS: 0

## 2025-04-22 ASSESSMENT — PAIN - FUNCTIONAL ASSESSMENT
PAIN_FUNCTIONAL_ASSESSMENT: 0-10

## 2025-04-22 ASSESSMENT — PAIN SCALES - GENERAL
PAINLEVEL_OUTOF10: 4
PAINLEVEL_OUTOF10: 0 - NO PAIN
PAINLEVEL_OUTOF10: 7
PAINLEVEL_OUTOF10: 0 - NO PAIN
PAINLEVEL_OUTOF10: 0 - NO PAIN
PAINLEVEL_OUTOF10: 6

## 2025-04-22 ASSESSMENT — COGNITIVE AND FUNCTIONAL STATUS - GENERAL
DAILY ACTIVITIY SCORE: 19
PERSONAL GROOMING: A LITTLE
MOVING TO AND FROM BED TO CHAIR: A LITTLE
MOVING FROM LYING ON BACK TO SITTING ON SIDE OF FLAT BED WITH BEDRAILS: A LITTLE
WALKING IN HOSPITAL ROOM: A LITTLE
CLIMB 3 TO 5 STEPS WITH RAILING: A LOT
HELP NEEDED FOR BATHING: A LITTLE
DRESSING REGULAR LOWER BODY CLOTHING: A LITTLE
TOILETING: A LITTLE
DRESSING REGULAR UPPER BODY CLOTHING: A LITTLE
STANDING UP FROM CHAIR USING ARMS: A LITTLE
MOBILITY SCORE: 17
TURNING FROM BACK TO SIDE WHILE IN FLAT BAD: A LITTLE

## 2025-04-22 ASSESSMENT — PAIN SCALES - PAIN ASSESSMENT IN ADVANCED DEMENTIA (PAINAD): TOTALSCORE: MEDICATION (SEE MAR)

## 2025-04-22 ASSESSMENT — PAIN DESCRIPTION - ORIENTATION: ORIENTATION: RIGHT

## 2025-04-22 ASSESSMENT — ACTIVITIES OF DAILY LIVING (ADL)
ADL_ASSISTANCE: INDEPENDENT
ADL_ASSISTANCE: INDEPENDENT
BATHING_ASSISTANCE: MINIMAL

## 2025-04-22 ASSESSMENT — PAIN DESCRIPTION - LOCATION: LOCATION: ABDOMEN

## 2025-04-22 NOTE — PROGRESS NOTES
"Tanmay Mcneill is a 39 y.o. male now POD # 3 s/p DDKT transplant.    Subjective   Doing well. HD yesterday. Minimal UOP       Objective   Physical Exam  Gen: A+OX3; NAD  Abd: S/NT/ND. Incision C/D/I.  Drains: Serosanguinous  x2  Ext: trace LE edema    Last Recorded Vitals  Blood pressure 163/75, pulse 76, temperature 36.3 °C (97.3 °F), temperature source Temporal, resp. rate 17, height 1.854 m (6' 1\"), weight (!) 159 kg (350 lb 6.7 oz), SpO2 93%.  Intake/Output last 3 Shifts:  I/O last 3 completed shifts:  In: 400 (2.5 mL/kg) [I.V.:400 (2.5 mL/kg)]  Out: 300 (1.9 mL/kg) [Urine:100 (0 mL/kg/hr); Drains:200]  Weight: 158.9 kg      Lab Results   Component Value Date    CREATININE 9.65 (H) 04/22/2025    K 4.2 04/22/2025    GLUCOSE 298 (H) 04/22/2025    HGB 9.2 (L) 04/22/2025    WBC 5.4 04/22/2025     (L) 04/22/2025    CALCIUM 9.4 04/22/2025       Current Medications[1]     Assessment/Plan    ESRD 2/2 DM and HTN    S/p DDKT 4/19/25   DCD donor, KDPI 48%  Full cava used as venous extension 2/2 depth  DGF, last HD yesterday  Continue BRETT x 2 (lateral deep, medial subcutaneous)  Cont DVT PPX   Encourage OOB  Pimentel 7 days - difficult placement/false passage. Urology scope to place  Initial US good, plan to repeat tomorrow    Immunosuppression   Goal thymo 4.5mg/kg - give last dose today  Tacrolimus - cont 2/2  MMF 1 g BID  Steroid taper    3. Diabetes  Appreciate endocrine     Donor Kidney Info:  Etiology:  DCD  Risk: no but was lung tx recipient  Terminal Cr  1.08   KDPI: 48 %  PRA: 19 %  CMV: D-/R+  EBV: D+/R+  Toxo: Donor Negative  HCV Ab/MELISSA: Negative  HBcAB: Negative  HBsAg/HBV MELISSA: Negative     I spent 35 minutes in the professional and overall care of this patient.      Doris Weston, MD         [1]   Current Facility-Administered Medications:     anti-thymocyte globulin rabbit (Thymoglobulin) 50 mg, hydrocortisone sodium succinate (PF) (Solu-CORTEF) 20 mg, heparin 1,000 Units in sodium chloride 0.9% 500 mL IV, " 50 mg, intravenous, Once, Joanna Rdz PA-C, 50 mg at 04/22/25 1157    atorvastatin (Lipitor) tablet 80 mg, 80 mg, oral, Daily, YONIS Becerra, 80 mg at 04/21/25 2112    ceFAZolin (Ancef) 2 g in dextrose (iso)  mL, 2 g, intravenous, Once, Doris Ontiveros MD    cholecalciferol (Vitamin D-3) tablet 25 mcg, 25 mcg, oral, Daily, Joanna Rdz PA-C, 25 mcg at 04/22/25 0820    clotrimazole (Mycelex) villa 10 mg, 10 mg, Mouth/Throat, TID after meals, Doris Ontiveros MD, 10 mg at 04/22/25 1311    dextrose 50 % injection 12.5 g, 12.5 g, intravenous, q15 min PRN, YONIS Oliver    dextrose 50 % injection 25 g, 25 g, intravenous, q15 min PRN, YONIS Oliver    furosemide (Lasix) tablet 80 mg, 80 mg, oral, BID, Joanna Rdz PA-C, 80 mg at 04/22/25 1612    glucagon (Glucagen) injection 1 mg, 1 mg, intramuscular, q15 min PRN, YONIS Oliver    heparin (porcine) injection 5,000 Units, 5,000 Units, subcutaneous, q8h ANURADHA, YONIS Becerra, 5,000 Units at 04/22/25 1311    [START ON 4/23/2025] insulin glargine (Lantus) injection 20 Units, 20 Units, subcutaneous, Daily, YONIS Oliver    insulin lispro injection 0-10 Units, 0-10 Units, subcutaneous, Before meals & nightly, Disha Maynard PA-C, 4 Units at 04/22/25 1116    insulin lispro injection 10 Units, 10 Units, subcutaneous, TID AC, YONIS Oliver, 10 Units at 04/22/25 1612    insulin NPH (Isophane) (HumuLIN N,NovoLIN N) injection 10 Units, 10 Units, subcutaneous, Nightly, Sita Dumont, APRN-CNP    mycophenolate (Cellcept) capsule 1,000 mg, 1,000 mg, oral, q12h ANURADHA, Neha Edouard MD, 1,000 mg at 04/22/25 0650    naloxone (Narcan) injection 0.2 mg, 0.2 mg, intravenous, q5 min PRN, Doris Ontiveors MD    ondansetron (Zofran) tablet 4 mg, 4 mg, oral, q8h PRN **OR** ondansetron (Zofran) injection 4 mg, 4 mg, intravenous, q8h PRN, Doris Ontiveros MD     oxyCODONE (Roxicodone) immediate release tablet 5 mg, 5 mg, oral, q4h PRN, Doris Ontiveros MD, 5 mg at 04/22/25 0810    oxygen (O2) therapy, , inhalation, Continuous PRN - O2/gases, Doris Ontiveros MD    pantoprazole (ProtoNix) EC tablet 40 mg, 40 mg, oral, BID AC, Doris Ontiveros MD, 40 mg at 04/22/25 1612    polyethylene glycol (Glycolax, Miralax) packet 17 g, 17 g, oral, Daily, Doris Ontiveros MD, 17 g at 04/20/25 1451    [START ON 4/23/2025] predniSONE (Deltasone) tablet 20 mg, 20 mg, oral, Daily, Doirs Ontiveros MD    sennosides (Senokot) tablet 8.6 mg, 1 tablet, oral, BID, Doris Ontiveros MD, 8.6 mg at 04/21/25 2112    sodium bicarbonate tablet 650 mg, 650 mg, oral, BID, YONIS Becerra, 650 mg at 04/22/25 0811    sulfamethoxazole-trimethoprim (Bactrim) 400-80 mg per tablet 1 tablet, 1 tablet, oral, Daily, Joanna Rdz PA-C, 1 tablet at 04/22/25 0937    tacrolimus (Prograf) capsule 3 mg, 3 mg, oral, q12h ANURADHA, SO Torres-CNP, 3 mg at 04/22/25 0651    traMADol (Ultram) tablet 25 mg, 25 mg, oral, q12h PRN, Joanna Rdz PA-C    traMADol (Ultram) tablet 50 mg, 50 mg, oral, q12h PRN, Joanna Rdz, TALHA    valGANciclovir (Valcyte) tablet 450 mg, 450 mg, oral, q48h, YONIS Becerra, 450 mg at 04/21/25 1606

## 2025-04-22 NOTE — PROGRESS NOTES
Tanmay Mcneill is a 39 y.o. male on day 4 of admission presenting with ESRD (end stage renal disease) (Multi).    Subjective   Pt seen and evaluated at the bedside.   Appetite is improving, eating small amounts.   Mallory 3 placed today.      I have reviewed histories, allergies and medications have been reviewed and there are no changes       Objective   Review of Systems   Constitutional:  Positive for fatigue. Negative for chills.   HENT:  Negative for rhinorrhea and sore throat.    Eyes:  Negative for photophobia and visual disturbance.   Respiratory:  Negative for cough and shortness of breath.    Cardiovascular:  Negative for chest pain and palpitations.   Gastrointestinal:  Negative for abdominal distention and blood in stool.   Endocrine: Negative for polydipsia, polyphagia and polyuria.   Genitourinary:  Negative for dysuria and hematuria.   Musculoskeletal:  Negative for neck pain and neck stiffness.   Neurological:  Negative for dizziness and facial asymmetry.   Psychiatric/Behavioral:  Negative for agitation and hallucinations.      Physical Exam  Constitutional:       General: He is not in acute distress.     Appearance: He is not ill-appearing.   HENT:      Head: Normocephalic.   Eyes:      Extraocular Movements: Extraocular movements intact.   Cardiovascular:      Rate and Rhythm: Normal rate and regular rhythm.      Pulses: Normal pulses.      Heart sounds: Normal heart sounds. No murmur heard.  Pulmonary:      Effort: Pulmonary effort is normal. No respiratory distress.      Breath sounds: Normal breath sounds.   Abdominal:      General: Abdomen is flat.      Palpations: Abdomen is soft.   Musculoskeletal:         General: Normal range of motion.   Skin:     General: Skin is warm and dry.   Neurological:      General: No focal deficit present.      Mental Status: He is alert and oriented to person, place, and time.   Psychiatric:         Mood and Affect: Mood normal.         Behavior: Behavior normal.  "        Thought Content: Thought content normal.         Judgment: Judgment normal.     Last Recorded Vitals  Blood pressure 131/79, pulse 79, temperature 36.4 °C (97.5 °F), temperature source Temporal, resp. rate 17, height 1.854 m (6' 1\"), weight (!) 159 kg (350 lb 6.7 oz), SpO2 95%.  Intake/Output last 3 Shifts:  I/O last 3 completed shifts:  In: 400 (2.5 mL/kg) [I.V.:400 (2.5 mL/kg)]  Out: 300 (1.9 mL/kg) [Urine:100 (0 mL/kg/hr); Drains:200]  Weight: 158.9 kg     Relevant Results  Results from last 7 days   Lab Units 25  0723 25  0535 25  1930 25  1626 25  0528 25  2135 25  1629 25  1322 25  0640 25  0524 25  0230 25  0050   POCT GLUCOSE mg/dL 292*  --  241* 265*  --  229* 252*  --    < >  --    < >  --    GLUCOSE mg/dL  --  298*  --   --  233*  --   --  201*  --  172*  --  197*    < > = values in this interval not displayed.     Scheduled medications  Scheduled Medications[1]  Continuous medications  Continuous Medications[2]  PRN medications  PRN Medications[3]    Assessment & Plan  ESRD (end stage renal disease) (Multi)    Type 2 diabetes mellitus with hyperglycemia, with long-term current use of insulin  History Of Present Illness  Tanmay Mcneill is a 39 y.o. male presenting with PMHx significant for ESRD 2/2 T2DM, as well as HTN, asthma, arthritis, and obesity who presents for  donor kidney transplant.      Diabetes History  Type of diabetes: 2  Year diagnosed or age: several years ago, first emr mention in 2018  Hospitalizations for DKA or HHS: none  Complications: ESRD now s/p DDKT, obesity  Seen by PCP or Endocrinology: PCP  Frequency of glucose checks: 0  Glucose review: no home record  Frequency of Hypoglycemia: none  Hypoglycemia unawareness: no  Severe hypoglycemia requiring assistance from others:  no     Home Medications  GLP-1: mounjaro 2.5mg x5 weeks so far  CGM: none  Previous meds: ozempic (GI upset), glargine " 8u/day                Hemoglobin A1C   Date/Time Value Ref Range Status   02/27/2025 10:54 AM 7.8 (H) 4.3 - 5.6 % Final       Comment:       American Diabetes Association guidelines indicate that patients with HgbA1c in the range 5.7-6.4% are at increased risk for development of diabetes, and intervention by lifestyle modification may be beneficial. HgbA1c greater or equal to 6.5% is considered diagnostic of diabetes.   04/02/2024 02:36 PM 7.2 (H) see below % Final      Steroid-induced hyperglycemia    PLAN  Steroids:  methylpred 500mg intra-op  methylpred 250 mg x1d  methylpred 125mg x1d  methylpred 60mg x1d  pred 20mg ongoing     Nutrition: Carb restricted     - Increase to NPH 30u with solumedrol dose 4/22 - last dose today     If NPO: continue     If steroid is held: hold    -  Recommend 10 units of NPH at bedtime x1 dose    - Recommend starting 20 units of glargine Q AM starting 4/23/25    - increase lispro to 10u with meals plus scale      If NPO or glucose <90mg/dL within 1hr before meal: hold     - continue lispro corrective scale #2 ACHS, adjust to scale #2 q4hr if persistently hyperglycemic >250mg/dL   = 0u  151-200 = 2u  201-250 = 4u  251-300 = 6u  301-350 = 8u  351-400 = 10u     -Accuchecks q4hr   - Goal BG <200  -Hypoglycemia protocol  -Will continue to follow and titrate insulin accordingly    Discharge planning:   [] patient may expect to discharge home on NPH and lispro, final doses TBD by titration  [x]will provide CGM sample prior to discharge - will need leandro hema downloaded  []will enroll pt in  pharmacy platinum plan program  [x]follow up with Disha Maynard PA-C in PTDM clinic, appointment request sent on 4/20/25        I spent 50 minutes in the professional and overall care of this patient.    Sita Dumont, SO-CNP         [1] atorvastatin, 80 mg, oral, Daily  ceFAZolin, 2 g, intravenous, Once  cholecalciferol, 25 mcg, oral, Daily  clotrimazole, 10 mg, Mouth/Throat, TID  after meals  furosemide, 80 mg, intravenous, q8h  heparin (porcine), 5,000 Units, subcutaneous, q8h ANURADHA  insulin lispro, 0-10 Units, subcutaneous, Before meals & nightly  insulin lispro, 6 Units, subcutaneous, TID AC  insulin NPH (Isophane), 20 Units, subcutaneous, q AM  mycophenolate, 1,000 mg, oral, q12h ANURADHA  pantoprazole, 40 mg, oral, BID AC  polyethylene glycol, 17 g, oral, Daily  [START ON 4/23/2025] predniSONE, 20 mg, oral, Daily  sennosides, 1 tablet, oral, BID  sodium bicarbonate, 650 mg, oral, BID  tacrolimus, 3 mg, oral, q12h ANURADHA  valGANciclovir, 450 mg, oral, q48h     [2]    [3] PRN medications: dextrose, dextrose, glucagon, glucagon, glucagon, HYDROmorphone, naloxone, ondansetron **OR** ondansetron, oxyCODONE, oxyCODONE, oxygen

## 2025-04-22 NOTE — CARE PLAN
The patient's goals for the shift include      The clinical goals for the shift include Patient will remain HDS throughout shift.    Over the shift, the patient did make progress toward the following goals.   Problem: Pain - Adult  Goal: Verbalizes/displays adequate comfort level or baseline comfort level  Outcome: Progressing  Flowsheets (Taken 4/22/2025 1810)  Verbalizes/displays adequate comfort level or baseline comfort level:   Encourage patient to monitor pain and request assistance   Assess pain using appropriate pain scale     Problem: Safety - Adult  Goal: Free from fall injury  Outcome: Progressing     Problem: Discharge Planning  Goal: Discharge to home or other facility with appropriate resources  Outcome: Progressing  Flowsheets (Taken 4/22/2025 1810)  Discharge to home or other facility with appropriate resources: Identify barriers to discharge with patient and caregiver     Problem: Chronic Conditions and Co-morbidities  Goal: Patient's chronic conditions and co-morbidity symptoms are monitored and maintained or improved  Outcome: Progressing  Flowsheets (Taken 4/22/2025 1810)  Care Plan - Patient's Chronic Conditions and Co-Morbidity Symptoms are Monitored and Maintained or Improved:   Monitor and assess patient's chronic conditions and comorbid symptoms for stability, deterioration, or improvement   Collaborate with multidisciplinary team to address chronic and comorbid conditions and prevent exacerbation or deterioration     Problem: Nutrition  Goal: Nutrient intake appropriate for maintaining nutritional needs  Outcome: Progressing     Problem: Diabetes  Goal: Increase stability of blood glucose readings by end of shift  Outcome: Progressing

## 2025-04-22 NOTE — CARE PLAN
The patient's goals for the shift include  pain control and rest    The clinical goals for the shift include pt will state adequate pain contol this shift

## 2025-04-22 NOTE — PROGRESS NOTES
"Tanmay Mcneill is a 39 y.o. male on day 4 of admission presenting with ESRD (end stage renal disease) (Multi).    Tolerated HD yest.   No acute issues overnight.   Minimal UOP.    Scheduled medications  Scheduled Medications[1]  Continuous medications  Continuous Medications[2]  PRN medications  PRN Medications[3]      Last Recorded Vitals  Blood pressure 170/79, pulse 86, temperature 36.3 °C (97.3 °F), temperature source Temporal, resp. rate 17, height 1.854 m (6' 1\"), weight (!) 159 kg (350 lb 6.7 oz), SpO2 93%.  Intake/Output last 3 Shifts:  I/O last 3 completed shifts:  In: 400 (2.5 mL/kg) [I.V.:400 (2.5 mL/kg)]  Out: 300 (1.9 mL/kg) [Urine:100 (0 mL/kg/hr); Drains:200]  Weight: 158.9 kg     A&ox3, no distress, pleasant  MMM, no lesions  Lungs with equal air entry, no added sounds  Rrr, no m/r/g  Abd soft, nt, nd  No allograft tenderness  No edema b/l    Results for orders placed or performed during the hospital encounter of 04/18/25 (from the past 24 hours)   POCT GLUCOSE   Result Value Ref Range    POCT Glucose 265 (H) 74 - 99 mg/dL   POCT GLUCOSE   Result Value Ref Range    POCT Glucose 241 (H) 74 - 99 mg/dL   Calcium, Ionized   Result Value Ref Range    POCT Calcium, Ionized 1.11 1.1 - 1.33 mmol/L   Magnesium   Result Value Ref Range    Magnesium 2.25 1.60 - 2.40 mg/dL   Renal Function Panel   Result Value Ref Range    Glucose 298 (H) 74 - 99 mg/dL    Sodium 131 (L) 136 - 145 mmol/L    Potassium 4.2 3.5 - 5.3 mmol/L    Chloride 91 (L) 98 - 107 mmol/L    Bicarbonate 24 21 - 32 mmol/L    Anion Gap 20 10 - 20 mmol/L    Urea Nitrogen 59 (H) 6 - 23 mg/dL    Creatinine 9.65 (H) 0.50 - 1.30 mg/dL    eGFR 6 (L) >60 mL/min/1.73m*2    Calcium 9.4 8.6 - 10.6 mg/dL    Phosphorus 5.3 (H) 2.5 - 4.9 mg/dL    Albumin 3.9 3.4 - 5.0 g/dL   CBC and Auto Differential   Result Value Ref Range    WBC 5.4 4.4 - 11.3 x10*3/uL    nRBC 0.0 0.0 - 0.0 /100 WBCs    RBC 3.83 (L) 4.50 - 5.90 x10*6/uL    Hemoglobin 9.2 (L) 13.5 - 17.5 g/dL "    Hematocrit 30.9 (L) 41.0 - 52.0 %    MCV 81 80 - 100 fL    MCH 24.0 (L) 26.0 - 34.0 pg    MCHC 29.8 (L) 32.0 - 36.0 g/dL    RDW 17.2 (H) 11.5 - 14.5 %    Platelets 106 (L) 150 - 450 x10*3/uL    Neutrophils % 83.3 40.0 - 80.0 %    Immature Granulocytes %, Automated 0.7 0.0 - 0.9 %    Lymphocytes % 3.9 13.0 - 44.0 %    Monocytes % 11.9 2.0 - 10.0 %    Eosinophils % 0.0 0.0 - 6.0 %    Basophils % 0.2 0.0 - 2.0 %    Neutrophils Absolute 4.47 1.20 - 7.70 x10*3/uL    Immature Granulocytes Absolute, Automated 0.04 0.00 - 0.70 x10*3/uL    Lymphocytes Absolute 0.21 (L) 1.20 - 4.80 x10*3/uL    Monocytes Absolute 0.64 0.10 - 1.00 x10*3/uL    Eosinophils Absolute 0.00 0.00 - 0.70 x10*3/uL    Basophils Absolute 0.01 0.00 - 0.10 x10*3/uL   Tacrolimus   Result Value Ref Range    Tacrolimus  2.7 <=15.0 ng/mL   POCT GLUCOSE   Result Value Ref Range    POCT Glucose 292 (H) 74 - 99 mg/dL   POCT GLUCOSE   Result Value Ref Range    POCT Glucose 224 (H) 74 - 99 mg/dL       Assessment and Plan:    39 y.o.  male with ESRD sec to DM2, on HD since 1/19/2022 ([facility], MWF, [facility], Dr. Rhodes), HTN, obesity, asthma, arthritis currently s/p DDKT on 4/19/2025.  Anuric versus oliguric at baseline.  Been on dialysis for 3 years through right upper extremity AV fistula     Donor kidney info:  DCD, was lung tx recipient, Terminal Cr  1.08, KDPI: 48 %, PRA: 19 %, CMV: D-/R+, EBV: D+/R+, Toxo: Donor Negative, HCV Ab/MELISSA: Negative, HBcAB: Negative,  HBsAg/HBV MELISSA: Negative. Full cava used as venous extension 2/2 depth    Allograft function:  - Expecting DGF  - HD 4/21. No indication for RRT today, plan for HD 4/23. And then MWF.  - can stop IV Lasix.  - Ultrasound transplant unremarkable.  - 7-day Pimentel due to difficult Pimentel placement  - acceptable serum K, CO2. hypoNa noted- likely volume related  - Hb ~9, acceptable. Monitor and transfuse as needed.   - Hemodynamics: Stable Bps. Monitor and titrate meds as indicated.    - CKD-MBD: Ca, Phos  acceptable. Replete Mg as indicated  - avoid potential nephrotoxins. Dose meds for CrCl  - WBC, plt stable     Immunosuppression:  - Thymo 4.5 mg/kg total planned. To be completed today.   - On Tac, MMF 1g q12, pred taper per protocol. Goal FK 8-10.  - Ppx: Bactrim, Valcyte (CMV -/+), Mycelex troches.     Rest of the management per primary team. Will follow    Juan Miguel Feliz MD         [1] antithymocyte globulin (rabbit), 50 mg, intravenous, Once  atorvastatin, 80 mg, oral, Daily  ceFAZolin, 2 g, intravenous, Once  cholecalciferol, 25 mcg, oral, Daily  clotrimazole, 10 mg, Mouth/Throat, TID after meals  furosemide, 80 mg, oral, BID  heparin (porcine), 5,000 Units, subcutaneous, q8h ANURADHA  [START ON 4/23/2025] insulin glargine, 20 Units, subcutaneous, Daily  insulin lispro, 0-10 Units, subcutaneous, Before meals & nightly  insulin lispro, 10 Units, subcutaneous, TID AC  insulin NPH (Isophane), 10 Units, subcutaneous, Nightly  mycophenolate, 1,000 mg, oral, q12h ANURADHA  pantoprazole, 40 mg, oral, BID AC  polyethylene glycol, 17 g, oral, Daily  [START ON 4/23/2025] predniSONE, 20 mg, oral, Daily  sennosides, 1 tablet, oral, BID  sodium bicarbonate, 650 mg, oral, BID  sulfamethoxazole-trimethoprim, 1 tablet, oral, Daily  tacrolimus, 3 mg, oral, q12h ANURADHA  valGANciclovir, 450 mg, oral, q48h     [2]    [3] PRN medications: dextrose, dextrose, glucagon, naloxone, ondansetron **OR** ondansetron, oxyCODONE, oxygen, traMADol, traMADol

## 2025-04-22 NOTE — HOSPITAL COURSE
Pt is a 38 y/o male with a hx of ESRD secondary to T2DM and HTN whom received a  donor kidney transplant on 2025 by Dr. Ontiveros with a KDPI of 48% and PRA of 19%. Donor was Hepc -/- and has/has not met risk factors. EBV +/+. Right donor kidney transplanted to patient right side. Admit weight is 153 kg (discharge weight is 160 kg ).  Pt received a total of 4.5 mg/kg total of thymoglobulin induction therapy in conjunction with 500mg IV solumedrol.  Pt was initiated on Tacrolimus & Cellcept immunosuppression in conjunction with IV solumedrol taper.  Pt was started on valcyte (CMV D - / R + ) and bactrim for CMV & PCP prophylaxis per protocol. He was started on clotrimazole as antifungal coverage per protocol. Operative course was uncomplicated.  Hospital course was uncomplicated. Pimentel catheter was removed on POD #7 and the patient is voiding spontaneously.  Pt required dialysis and electrolytes remain stable. Dialysis access via AVF and was patent on last use. BP & glucose under good control.     Pt is tolerating a carb controlled, renal diet, maintaining adequate hydration with oral intake, ambulating independently and having BMs. Immunosuppression was managed by the transplant team. Patient is in stable condition for discharge home with renal telehealth today and homecare for drain. RX delivered to bedside prior to discharge. The patient will f/u in the transplant institute and have labs drawn in the walk-in clinic.

## 2025-04-22 NOTE — CONSULTS
Inpatient Diabetes Education Consult    Reason for Visit:  Tanmay Mcneill is a 39 y.o. male who presents for steroid induced hyperglycemia s/p DDKT; hx T2DM    Consulting Service/Provider: Transplant team    Visit Type: Initial visit    Visit Modality: In-person    Discharge Equipment/Supply Needs: will need Mallory 3 CGMs for home as well as insulin pens/pen supplies       Patient has supplies at home: Blood glucose meter:  , Testing strips:  , and Lancets    Patient History and Assessment:  New diagnosis: Steroid-induced hyperglycemia  Previous diagnosis: Type 2  Patient known to Diabetes Education department: No  Treatment prior to hospital admission: Insulin: Long acting, via pen  Blood glucose testing: Does not routinely test  Also was taking Mounjaro weekly prior to this admission  Complications: cardiovascular disease, obesity, and ESRD s/p DDKT  PTA Medications:    Current Outpatient Medications   Medication Instructions    acetaminophen (TYLENOL) 650 mg, oral, Every 6 hours PRN, Do not exceed 3000 mg in 24 hours.    acyclovir (ZOVIRAX) 400 mg, oral, 2 times daily    albuterol (Ventolin HFA) 90 mcg/actuation inhaler 2 puffs, inhalation, Every 4 hours PRN    amLODIPine (Norvasc) 10 mg tablet 1 tablet, oral, Daily    atorvastatin (Lipitor) 80 mg tablet 1 tablet, oral, Daily    blood sugar diagnostic (Accu-Chek Celi Plus test strp) strip 1 strip, 4 times daily    carvedilol (COREG) 25 mg, 2 times daily (morning and late afternoon)    [START ON 4/23/2025] cholecalciferol (VITAMIN D-3) 25 mcg, oral, Daily    clotrimazole (MYCELEX) 10 mg, Mouth/Throat, 3 times daily after meals    docusate sodium (COLACE) 100 mg, oral, 2 times daily PRN    furosemide (LASIX) 80 mg, oral, 2 times daily (morning and late afternoon)    hydrALAZINE (Apresoline) 100 mg tablet 1 tablet, oral, Every 12 hours    lancets (OneTouch UltraSoft Lancets) misc 1 Lancet, miscellaneous, 2 times daily    letermovir (PREVYMIS) 480 mg, oral, Daily     "losartan (COZAAR) 50 mg, oral, Daily    Mounjaro 2.5 mg, subcutaneous, Every 7 days    mycophenolate (CELLCEPT) 1,000 mg, oral, Every 12 hours scheduled (0630,1830)    omeprazole (PRILOSEC) 40 mg, oral, Daily, Do not crush or chew.    pantoprazole (PROTONIX) 40 mg, oral, Daily before breakfast, Do not crush, chew, or split.    pen needle, diabetic 31 gauge x 1/4\" needle miscellaneous    polyethylene glycol (GLYCOLAX, MIRALAX) 17 g, oral, Daily PRN    [START ON 4/23/2025] predniSONE (DELTASONE) 20 mg, oral, Daily    sodium bicarbonate 650 mg tablet 2 tablets, 2 times daily    sodium bicarbonate 650 mg, oral, 2 times daily    [START ON 4/23/2025] sulfamethoxazole-trimethoprim (Bactrim) 400-80 mg tablet 1 tablet, oral, Daily    tacrolimus (PROGRAF) 3 mg, oral, 2 times daily, Meds to beds, ADOD 4/23    tacrolimus (PROGRAF) 0.5 mg, oral, 2 times daily    zolpidem (Ambien) 5 mg tablet 0.5 tablets, oral, Nightly PRN       Glucose   Date/Time Value Ref Range Status   04/22/2025 05:35  (H) 74 - 99 mg/dL Final   04/21/2025 05:28  (H) 74 - 99 mg/dL Final   04/20/2025 01:22  (H) 74 - 99 mg/dL Final   04/20/2025 05:24  (H) 74 - 99 mg/dL Final   04/20/2025 12:50  (H) 74 - 99 mg/dL Final   04/19/2025 07:23  (H) 74 - 99 mg/dL Final   04/19/2025 03:25  (H) 74 - 99 mg/dL Final   04/19/2025 01:33  (H) 74 - 99 mg/dL Final   06/23/2020 10:51  (H) 74 - 99 mg/dL Final   03/26/2020 11:36 AM 56 (L) 74 - 99 mg/dL Final   01/31/2020 03:11  (H) 74 - 99 mg/dL Final   05/17/2019 02:37  (H) 74 - 99 mg/dL Final   02/12/2019 03:35  (H) 74 - 99 mg/dL Final   07/23/2018 06:46  (H) 74 - 99 mg/dL Final   07/22/2018 08:43  (H) 74 - 99 mg/dL Final   07/21/2018 10:50  (HH) 74 - 99 mg/dL Final     Comment:      23:59 07/21/2018.  GLUC CALLED-RB TO MARYLIN PEREZ, 07/21/2018 23:59     07/21/2018 10:41  (H) 74 - 99 mg/dL Final   07/21/2018 01:34  (HH) 74 " - 99 mg/dL Final     Comment:      02:24 07/21/2018.  GLU Called- RB to DR VAZQUEZ , 07/21/2018 02:24       C-Peptide   Date Value Ref Range Status   04/02/2024 12.5 (H) 0.7 - 3.9 ng/mL Final     Hemoglobin A1C   Date Value Ref Range Status   02/27/2025 7.8 (H) 4.3 - 5.6 % Final     Comment:     American Diabetes Association guidelines indicate that patients with HgbA1c in the range 5.7-6.4% are at increased risk for development of diabetes, and intervention by lifestyle modification may be beneficial. HgbA1c greater or equal to 6.5% is considered diagnostic of diabetes.   04/02/2024 7.2 (H) see below % Final   06/27/2023 7.6 (H) 4.3 - 5.6 % Final     Comment:     American Diabetes Association guidelines indicate that patients with HgbA1c in the range 5.7-6.4% are at increased risk for development of diabetes, and intervention by lifestyle modification may be beneficial. HgbA1c greater or equal to 6.5% is considered diagnostic of diabetes.   10/29/2021 6.2 (A) % Final     Comment:          Diagnosis of Diabetes-Adults   Non-Diabetic: < or = 5.6%   Increased risk for developing diabetes: 5.7-6.4%   Diagnostic of diabetes: > or = 6.5%  .       Monitoring of Diabetes                Age (y)     Therapeutic Goal (%)   Adults:          >18           <7.0   Pediatrics:    13-18           <7.5                   7-12           <8.0                   0- 6            7.5-8.5   American Diabetes Association. Diabetes Care 33(S1), Jan 2010.     07/13/2021 6.4 (H) 4.3 - 5.6 % Final     Comment:     American Diabetes Association guidelines indicate that patients with HgbA1c in   the range 5.7-6.4% are at increased risk for development of diabetes, and   intervention by lifestyle modification may be beneficial. HgbA1c greater or   equal to 6.5% is considered diagnostic of diabetes.       Patient Learning/Readiness Assessment:  Mr. Mcneill is agreeable to diabetes ed visit today.    Interventions/Topics Covered:  See After Visit  Summary for handouts/information sheets provided to patient.  Education Documentation  Insulin Dose Calculation, taught by Nayana Cyr RN at 4/22/2025 12:26 PM.  Learner: Family, Patient  Readiness: Acceptance  Method: Explanation, Demonstration, Handout, Teach-back  Response: Verbalizes Understanding  Comment: has used Lantus insulin in the past; education re bolus + SS which he recalls in the distant past.    Insulin Administration, taught by Nayana Cyr RN at 4/22/2025 12:26 PM.  Learner: Family, Patient  Readiness: Acceptance  Method: Explanation, Demonstration, Handout, Teach-back  Response: Verbalizes Understanding  Comment: has used Lantus insulin in the past; education re bolus + SS which he recalls in the distant past.    Insulin Types, taught by Nayana Cyr RN at 4/22/2025 12:26 PM.  Learner: Family, Patient  Readiness: Acceptance  Method: Explanation, Demonstration, Handout, Teach-back  Response: Verbalizes Understanding  Comment: has used Lantus insulin in the past; education re bolus + SS which he recalls in the distant past.    Hypoglycemia, taught by Nayana Cyr RN at 4/22/2025 12:25 PM.  Learner: Patient  Readiness: Acceptance  Method: Explanation, Teach-back  Response: Verbalizes Understanding  Comment: he is aware of how to manage hypoglycemia appropriately    Hyperglycemia, taught by Nayana Cyr RN at 4/22/2025 12:25 PM.  Learner: Patient  Readiness: Acceptance  Method: Explanation, Teach-back  Response: Verbalizes Understanding  Comment: he is aware of how to manage hypoglycemia appropriately    CGM Apps Available, taught by Nayana Cyr RN at 4/22/2025 12:24 PM.  Learner: Patient  Readiness: Acceptance  Method: Explanation, Teach-back, Handout  Response: Verbalizes Understanding, Needs Reinforcement  Comment: he is downloading the leandro hema to his phone    What is Continuous Glucose Monitoring (CGM), taught by Nayana Cyr RN at 4/22/2025 12:24 PM.  Learner: Patient  Readiness: Acceptance  Method:  Explanation, Teach-back, Handout  Response: Verbalizes Understanding, Needs Reinforcement  Comment: he is downloading the mallory hema to his phone    Monitoring Blood Glucose, taught by Nayana Cyr RN at 4/22/2025 12:24 PM.  Learner: Patient  Readiness: Acceptance  Method: Explanation, Teach-back, Handout  Response: Verbalizes Understanding, Needs Reinforcement  Comment: he is downloading the mallory hema to his phone          Additional topics covered: Review of his DM history and prior insulin use.  He is familiar with basal/bolus and SS insulin.  Reviewed how to use the pen -- he is comfortable with this.  Insulin chart written out and he is able to follow to determine meal time dose correctly with bolus + SS.  Mallory CGM placed to left arm with education each step of the way.  He is comfortable with the device.    Discussion re Lantus and Humalog for home starting tomorrow once steroid dose is 20 mg oral per day.  He verbalizes understanding re steroid induced hyperglycemia.    Additional materials provided: Insulin chart    Trial CGM provided to patient: FreeStyle Mallory    Education Outcome/Recommendations:        Recommendations for bedside nursing: Allow patient to self-inject insulin (supervised)    Recommendations for Providers: Follow-up w/ PCP and/or Endocrinology    Additional Comments: Mr. Mcneill is agreeable to follow up visit in am to review CGM function and insulin regimen.  Time spent:  50 mins.

## 2025-04-22 NOTE — PROGRESS NOTES
"Tanmay Mcneill is a 39 y.o. male now POD # 3 s/p DDKT transplant.    Subjective   Doing well. HD today. Minimal UOP       Objective   Physical Exam  Gen: A+OX3; NAD  Abd: S/NT/ND. Incision C/D/I.  Drains: Serosanguinous  x2  Ext: trace LE edema    Last Recorded Vitals  Blood pressure 163/75, pulse 76, temperature 36.3 °C (97.3 °F), temperature source Temporal, resp. rate 17, height 1.854 m (6' 1\"), weight (!) 159 kg (350 lb 6.7 oz), SpO2 93%.  Intake/Output last 3 Shifts:  I/O last 3 completed shifts:  In: 400 (2.5 mL/kg) [I.V.:400 (2.5 mL/kg)]  Out: 300 (1.9 mL/kg) [Urine:100 (0 mL/kg/hr); Drains:200]  Weight: 158.9 kg      Lab Results   Component Value Date    CREATININE 9.65 (H) 04/22/2025    K 4.2 04/22/2025    GLUCOSE 298 (H) 04/22/2025    HGB 9.2 (L) 04/22/2025    WBC 5.4 04/22/2025     (L) 04/22/2025    CALCIUM 9.4 04/22/2025       Current Medications[1]     Assessment/Plan    ESRD 2/2 DM and HTN    S/p DDKT 4/19/25   DCD donor, KDPI 48%  Full cava used as venous extension 2/2 depth  Slow graft function, expect DGF  Continue BRETT x 2 (lateral deep, medial subcutaneous)  Start DVT PPX  HD today  Encourage OOB  Pimentel 7 days - difficult placement/false passage. Urology scope to place  Initial US good, plan to repeat today    Immunosuppression   Goal thymo 4.5mg/kg - give third dose today  Tacrolimus - cont 2/2  MMF 1 g BID  Steroid taper    3. Diabetes  Appreciate endocrine     Donor Kidney Info:  Etiology:  DCD  Risk: no but was lung tx recipient  Terminal Cr  1.08   KDPI: 48 %  PRA: 19 %  CMV: D-/R+  EBV: D+/R+  Toxo: Donor Negative  HCV Ab/MELISSA: Negative  HBcAB: Negative  HBsAg/HBV MELISSA: Negative     I spent 35 minutes in the professional and overall care of this patient.      Doris Ontiveros MD         [1]   Current Facility-Administered Medications:     anti-thymocyte globulin rabbit (Thymoglobulin) 50 mg, hydrocortisone sodium succinate (PF) (Solu-CORTEF) 20 mg, heparin 1,000 Units in sodium chloride 0.9% " 500 mL IV, 50 mg, intravenous, Once, Joanna Rdz PA-C, 50 mg at 04/22/25 1157    atorvastatin (Lipitor) tablet 80 mg, 80 mg, oral, Daily, YONIS Becerra, 80 mg at 04/21/25 2112    ceFAZolin (Ancef) 2 g in dextrose (iso)  mL, 2 g, intravenous, Once, Doris Ontiveros MD    cholecalciferol (Vitamin D-3) tablet 25 mcg, 25 mcg, oral, Daily, Joanna Rdz PA-C, 25 mcg at 04/22/25 0820    clotrimazole (Mycelex) villa 10 mg, 10 mg, Mouth/Throat, TID after meals, Doris Ontiveros MD, 10 mg at 04/22/25 1311    dextrose 50 % injection 12.5 g, 12.5 g, intravenous, q15 min PRN, YONIS Oliver    dextrose 50 % injection 25 g, 25 g, intravenous, q15 min PRN, YONIS Oliver    furosemide (Lasix) tablet 80 mg, 80 mg, oral, BID, Joanna Rdz PA-C, 80 mg at 04/22/25 1612    glucagon (Glucagen) injection 1 mg, 1 mg, intramuscular, q15 min PRN, YONIS Oliver    heparin (porcine) injection 5,000 Units, 5,000 Units, subcutaneous, q8h [facility], YONIS Becerra, 5,000 Units at 04/22/25 1311    [START ON 4/23/2025] insulin glargine (Lantus) injection 20 Units, 20 Units, subcutaneous, Daily, YONIS Oliver    insulin lispro injection 0-10 Units, 0-10 Units, subcutaneous, Before meals & nightly, Disha Maynard PA-C, 4 Units at 04/22/25 1116    insulin lispro injection 10 Units, 10 Units, subcutaneous, TID AC, YONIS Oliver, 10 Units at 04/22/25 1612    insulin NPH (Isophane) (HumuLIN N,NovoLIN N) injection 10 Units, 10 Units, subcutaneous, Nightly, Sita Dumont, APRN-CNP    mycophenolate (Cellcept) capsule 1,000 mg, 1,000 mg, oral, q12h ANURADHA, Neha Edouard MD, 1,000 mg at 04/22/25 0650    naloxone (Narcan) injection 0.2 mg, 0.2 mg, intravenous, q5 min PRN, Doris Ontiveros MD    ondansetron (Zofran) tablet 4 mg, 4 mg, oral, q8h PRN **OR** ondansetron (Zofran) injection 4 mg, 4 mg, intravenous, q8h PRN, Doris  MD Weston    oxyCODONE (Roxicodone) immediate release tablet 5 mg, 5 mg, oral, q4h PRN, Doris Ontiveros MD, 5 mg at 04/22/25 0810    oxygen (O2) therapy, , inhalation, Continuous PRN - O2/gases, Doris Ontiveros MD    pantoprazole (ProtoNix) EC tablet 40 mg, 40 mg, oral, BID AC, Doris Ontiveros MD, 40 mg at 04/22/25 1612    polyethylene glycol (Glycolax, Miralax) packet 17 g, 17 g, oral, Daily, Doris Ontiveros MD, 17 g at 04/20/25 1451    [START ON 4/23/2025] predniSONE (Deltasone) tablet 20 mg, 20 mg, oral, Daily, Doris Ontiveros MD    sennosides (Senokot) tablet 8.6 mg, 1 tablet, oral, BID, Doris Ontiveros MD, 8.6 mg at 04/21/25 2112    sodium bicarbonate tablet 650 mg, 650 mg, oral, BID, SO Becerra-CNP, 650 mg at 04/22/25 0811    sulfamethoxazole-trimethoprim (Bactrim) 400-80 mg per tablet 1 tablet, 1 tablet, oral, Daily, Joanna Rdz PA-C, 1 tablet at 04/22/25 0937    tacrolimus (Prograf) capsule 3 mg, 3 mg, oral, q12h ANURADHA, SO Torres-CNP, 3 mg at 04/22/25 0651    traMADol (Ultram) tablet 25 mg, 25 mg, oral, q12h PRN, Joanna Rdz PA-C    traMADol (Ultram) tablet 50 mg, 50 mg, oral, q12h PRN, Joanna Rdz PA-C    valGANciclovir (Valcyte) tablet 450 mg, 450 mg, oral, q48h, YONIS Becerra, 450 mg at 04/21/25 1606

## 2025-04-22 NOTE — PROGRESS NOTES
Pharmacist Transplant Education Note    The medications for Tanmay Mcneill were reviewed in conjunction with the multidisciplinary transplant team. The pharmacist is in agreement with the plan of care for medications at this time.     Approximately 30 minutes were spent with this patient providing medication education and reviewing new post-transplant medications. There were no additional caregivers present during this education session.    An outpatient dosing schedule was created for all oral medications. This schedule was discussed in detail with the patient, including drug name, use, dose, and the appropriate timing of self-administration (i.e. with or without meals, with or without other medications). Also discussed side effects, drug interactions, and the importance of adherence. Both verbal and written instructions were given to the patient outlining the appropriate use of all current oral medications.     The patient demonstrated an acceptable understanding of his current medication list. All questions were answered to the patient's satisfaction, and the patient confirmed understanding.     Follow-up education will take place prior to discharge where a finalized list of discharge medications will be reviewed with the patient. Further educational reinforcement should be provided in the outpatient clinic.    Armani Maldonado   Pharmacy Student

## 2025-04-22 NOTE — PROGRESS NOTES
Physical Therapy    Physical Therapy Evaluation    Patient Name: Tanmay Mcneill  MRN: [id number]  Today's Date: 4/22/2025   Time Calculation  Start Time: 1045  Stop Time: 1056  Time Calculation (min): 11 min    Assessment/Plan   PT Assessment  PT Assessment Results: Decreased strength, Impaired balance, Decreased mobility  Rehab Prognosis: Good  Barriers to Discharge Home: No anticipated barriers  End of Session Communication: Bedside nurse  Assessment Comment: Pt presents with decreased strength, balance, and mobility. Pt would benefit from PT to address the above deficits.  End of Session Patient Position: Up in chair, Alarm off, not on at start of session  IP OR SWING BED PT PLAN  Inpatient or Swing Bed: Inpatient  PT Plan  Treatment/Interventions: Bed mobility, Transfer training, Gait training, Stair training, Balance training, Strengthening  PT Plan: Ongoing PT  PT Frequency: 3 times per week  PT Discharge Recommendations: No PT needed after discharge  Equipment Recommended upon Discharge:  (bariatric wheeled walker)  PT Recommended Transfer Status: Assistive device, Contact guard  PT - OK to Discharge: Yes      Subjective   General Visit Information:  Reason for Referral: s/p DDKT  Past Medical History Relevant to Rehab: ESRD 2/2 T2DM, as well as HTN, asthma, arthritis, and obesity  Co-Treatment: OT  Co-Treatment Reason: to maximize mobility and safety   Home Living:  Home Living  Lives With: Alone (plans to stay with his godmother for a couple weeks)  Home Adaptive Equipment: None  Home Living Comments: Godmother lives in an apartment with 4 steps to enter and 6 steps within the building  Prior Level of Function:  Prior Function Per Pt/Caregiver Report  ADL Assistance: Independent  Homemaking Assistance: Independent  Ambulatory Assistance: Independent  Vocational: Full time employment  Prior Function Comments: +drives  Precautions:  Precautions  Medical Precautions: Fall precautions  Post-Surgical Precautions:  Abdominal surgery precautions       Objective     Pain:  Pain Assessment  Pain Assessment: 0-10  0-10 (Numeric) Pain Score: 6  Pain Location: Abdomen  Pain Interventions: Repositioned  Cognition:  Cognition  Overall Cognitive Status: Within Functional Limits      Extremity/Trunk Assessments:  Strength:           RLE   RLE : Within Functional Limits  LLE   LLE : Within Functional Limits    General Assessments:            Sensation  Light Touch: No apparent deficits     Static Sitting Balance  Static Sitting-Balance Support: Feet supported  Static Sitting-Level of Assistance: Distant supervision  Dynamic Sitting Balance  Dynamic Sitting-Balance Support: Feet supported  Dynamic Sitting-Level of Assistance: Close supervision  Static Standing Balance  Static Standing-Balance Support: Bilateral upper extremity supported  Static Standing-Level of Assistance: Close supervision  Dynamic Standing Balance  Dynamic Standing-Balance Support: Bilateral upper extremity supported  Dynamic Standing-Level of Assistance: Contact guard    Functional Assessments:  Bed Mobility  Bed Mobility: Yes  Bed Mobility 1  Bed Mobility 1: Supine to sitting  Level of Assistance 1: Contact guard  Bed Mobility Comments 1: verbal cues for logroll, use of bed rail  Transfers  Transfer: Yes  Transfer 1  Technique 1: Sit to stand, Stand to sit  Transfer Device 1: Walker  Transfer Level of Assistance 1: Contact guard  Trials/Comments 1: verbal cues for hand placement  Ambulation/Gait Training  Ambulation/Gait Training Performed: Yes  Ambulation/Gait Training 1  Device 1: Rolling walker  Assistance 1: Contact guard  Quality of Gait 1: Decreased step length (decreased ana)  Comments/Distance (ft) 1: 25 feet          Outcome Measures:  [facility] Basic Mobility  Turning from your back to your side while in a flat bed without using bedrails: A little  Moving from lying on your back to sitting on the side of a flat bed without using bedrails: A little  Moving  to and from bed to chair (including a wheelchair): A little  Standing up from a chair using your arms (e.g. wheelchair or bedside chair): A little  To walk in hospital room: A little  Climbing 3-5 steps with railing: A lot  Basic Mobility - Total Score: 17                               Encounter Problems       Encounter Problems (Active)       Balance       No LOB with transfers/ambulation        Start:  04/22/25    Expected End:  05/13/25               Mobility       STG - Patient will ambulate 150 feet with LRD modified independent        Start:  04/22/25    Expected End:  05/13/25            STG - Patient will ascend and descend four to six stairs with LRD modified independent        Start:  04/22/25    Expected End:  05/13/25               PT Transfers       STG - Patient will perform bed mobility independent        Start:  04/22/25    Expected End:  05/13/25            STG - Patient will transfer sit to and from stand with LRD modified independent        Start:  04/22/25    Expected End:  05/13/25               Pain - Adult              Education Documentation  Precautions, taught by Carol Blancas PT at 4/22/2025 11:49 AM.  Learner: Patient  Readiness: Acceptance  Method: Explanation  Response: Verbalizes Understanding  Comment: safe mobility, fall precautions, abdominal precautions    Mobility Training, taught by Carol Blancas PT at 4/22/2025 11:49 AM.  Learner: Patient  Readiness: Acceptance  Method: Explanation  Response: Verbalizes Understanding  Comment: safe mobility, fall precautions, abdominal precautions    Education Comments  No comments found.              Carol Blancas, PT

## 2025-04-22 NOTE — SIGNIFICANT EVENT
04/22/25 1027   Patient Interaction   Organ Kidney   Type of Interaction Morning rounds   Interdisciplinary Rounds   Attendance Physician;NOEL;Pharmacist;Coordinator   Topics Discussed Diet;Home care needs;Medications;Blood test results;Activity;Discharge preparation     Transplant Surgery Multidisciplinary Team Note    Tanmay Mcneill is a 39 y.o. male   POD#3 from a Kidney from a DCD donor. His post operative complications: Need for dialysis     24 Hour Events  1. No acute events     Last Recorded Vitals  Visit Vitals  /79 (BP Location: Left arm, Patient Position: Sitting)   Pulse 79   Temp 36.4 °C (97.5 °F) (Temporal)   Resp 17      Intake/Output last 3 Shifts:    Intake/Output Summary (Last 24 hours) at 4/22/2025 1027  Last data filed at 4/22/2025 1019  Gross per 24 hour   Intake 680 ml   Output 165 ml   Net 515 ml      Vitals:    04/22/25 0600   Weight: (!) 159 kg (350 lb 6.7 oz)        Assessment/Plan   Kidney allograft function  -DGF   -HD yesterday, 2.5 liters UF   -DUS kidney yesterday w/ interval mild increase in RIs      Immunosuppression/PPX  -50 mg Thymo today to complete 4.5 mg/kg total  -Re-start Bactrim      Hyperkalemia--> improved to 4 .2  -Lokelma stopped     PT/OT  Subcutaneous heparin 5k TID        Principal Problem:    Management after organ transplant  Active Problems:  Problem List[1]     Immunosuppression reviewed and adjusted       Induction: Thymoglobulin Full Dose       Tacrolimus goal 8-10 ng/mL. Current dose 3 mg BID         MMF 1000 mg po BID       Solumedrol taper  DVT prophylaxis SCDS and subcutaneous heparin 5000 TID  PT/OT  Diet:  carb controlled, renal, low phos   Anticipated discharge Wednesday/Thursday     Joanna Rdz PA-C             [1]   Patient Active Problem List  Diagnosis    Acid reflux    Arteriovenous graft for hemodialysis in place, primary    Asthma    CKD (chronic kidney disease), stage V (Multi)    Chronic pain of right knee    Dyslipidemia     Hypertension    Morbid obesity (Multi)    Osteoarthritis    Snoring    Type 2 diabetes mellitus    Vitamin D deficiency    Transplant    Preop cardiovascular exam    ESRD (end stage renal disease) (Multi)    Type 2 diabetes mellitus with hyperglycemia, with long-term current use of insulin    Steroid-induced hyperglycemia

## 2025-04-22 NOTE — PROGRESS NOTES
Occupational Therapy    Evaluation    Patient Name: Tanmay Mcneill  MRN: [id number]  Department: [facility] LT 9  Room: [facility]90-  Today's Date: 4/22/2025  Time Calculation  Start Time: 1045  Stop Time: 1056  Time Calculation (min): 11 min        Assessment:  OT Assessment: Pt presents to OT with increased falls risk, decreased safety and independence with functional transfers and mobility and impaired ADL performance.  Pt will continue to benefit from skilled OT services to address deficits and facilitate safe return to PLOF and home environment.   Prognosis: Good  Barriers to Discharge Home: No anticipated barriers  Evaluation/Treatment Tolerance: Patient tolerated treatment well  Medical Staff Made Aware: Yes  End of Session Communication: Bedside nurse  End of Session Patient Position: Up in chair, Alarm off, not on at start of session  OT Assessment Results: Decreased ADL status, Decreased IADLs, Decreased endurance, Decreased functional mobility  Prognosis: Good  Evaluation/Treatment Tolerance: Patient tolerated treatment well  Medical Staff Made Aware: Yes  Plan:  Treatment Interventions: ADL retraining, Functional transfer training, UE strengthening/ROM, Endurance training, Continued evaluation, Compensatory technique education, Patient/family training, Equipment evaluation/education  OT Frequency: 2 times per week  OT Discharge Recommendations: No OT needed after discharge (A from family prn)  OT Recommended Transfer Status: Assist of 1, Stand by assist  OT - OK to Discharge: Yes  Treatment Interventions: ADL retraining, Functional transfer training, UE strengthening/ROM, Endurance training, Continued evaluation, Compensatory technique education, Patient/family training, Equipment evaluation/education    Subjective   Current Problem:  1. ESRD (end stage renal disease) (Multi)  Case Request Operating Room: TRANSPLANT, KIDNEY    Case Request Operating Room: TRANSPLANT, KIDNEY      2. Kidney replaced by transplant  ([facility])  pantoprazole (ProtoNix) 40 mg EC tablet    acetaminophen (Tylenol) 325 mg tablet    clotrimazole (Mycelex) 10 mg villa    mycophenolate (Cellcept) 250 mg capsule    polyethylene glycol (Glycolax, Miralax) 17 gram packet    predniSONE (Deltasone) 5 mg tablet    tacrolimus (Prograf) 1 mg capsule    tacrolimus (Prograf) 0.5 mg capsule    docusate sodium (Colace) 100 mg capsule    letermovir (Prevymis) 480 mg tablet    furosemide (Lasix) 40 mg tablet    sulfamethoxazole-trimethoprim (Bactrim) 400-80 mg tablet    acyclovir (Zovirax) 200 mg capsule    DISCONTINUED: valGANciclovir (Valcyte) 450 mg tablet      3. Vitamin D deficiency  cholecalciferol (Vitamin D-3) 25 mcg (1,000 units) tablet      4. Renal tubular acidosis  sodium bicarbonate 650 mg tablet        General:  General  Reason for Referral: s/p DDKT  Past Medical History Relevant to Rehab: ESRD 2/2 T2DM, as well as HTN, asthma, arthritis, and obesity  Co-Treatment: PT  Co-Treatment Reason: 2/2 high BMI and pt with h/o refusing nursing care; in order to maximize pt safety and therapeutic potential  Prior to Session Communication: Bedside nurse  Patient Position Received: Bed, 3 rail up, Alarm off, not on at start of session  General Comment: cooperative following education  Precautions:  Medical Precautions: Fall precautions  Post-Surgical Precautions: Abdominal surgery precautions  Precautions Comment: Skilled pt education provided on abdominal precautions, with review of adaptive ADL/IADLs with respect to precautions and logroll technique.  Pt receptive and engaged in education, supine in bed.  Pt trialed figure 4 sitting strategy for increased safety, ease and independence with LB ADLS, completing with CGA following education.  Pt participated in bed mobility, trialing logroll, from HOB 45 with CGA; reviewed and reinforced with pt.  Pt demonstrates and verbalizes understanding to all education.             Pain:  Pain Assessment  Pain Assessment:  0-10  0-10 (Numeric) Pain Score: 6  Pain Type: Surgical pain  Pain Location: Abdomen  Pain Interventions: Repositioned, Ambulation/increased activity  Response to Interventions: Resting quietly    Objective   Cognition:  Overall Cognitive Status: Within Functional Limits           Home Living:  Type of Home: Apartment  Lives With:  (plans to d/c to godmother's apt)  Home Adaptive Equipment: None  Home Layout: One level  Home Access: Stairs to enter with rails  Entrance Stairs-Number of Steps: 4 up and 6 down  Bathroom Shower/Tub: Tub/shower unit  Prior Function:  Level of [place]: Independent with ADLs and functional transfers, Independent with homemaking with ambulation  ADL Assistance: Independent  Homemaking Assistance: Independent  Ambulatory Assistance: Independent  Vocational: Full time employment  Prior Function Comments: -falls  IADL History:  Current License: Yes  Mode of Transportation: Car  Occupation: Full time employment  Type of Occupation:   ADL:  Eating Assistance: Independent  Grooming Assistance: Stand by  Grooming Deficit: Setup  Bathing Assistance: Minimal (anticipate)  UE Dressing Assistance: Stand by (anticipate)  UE Dressing Deficit: Setup  LE Dressing Assistance:  (CGA, inc effort following edu on figure4)  LE Dressing Deficit: Don/doff R sock, Don/doff L sock  Toileting Assistance with Device: Minimal (anticipate)  Activity Tolerance:  Endurance: Tolerates 10 - 20 min exercise with multiple rests  Bed Mobility/Transfers: Bed Mobility  Bed Mobility: Yes  Bed Mobility 1  Bed Mobility 1: Supine to sitting  Level of Assistance 1: Contact guard  Bed Mobility Comments 1: cues for logroll, bedrail, HOB raised    Transfers  Transfer: Yes  Transfer 1  Technique 1: Sit to stand, Stand to sit  Transfer Device 1: Walker  Transfer Level of Assistance 1: Contact guard  Trials/Comments 1: cues for safe technique      Functional Mobility:  Functional Mobility  Functional Mobility Performed:  Yes  Functional Mobility 1  Comments 1: facilitated short household distances with RW and CGA      Vision:Vision - Basic Assessment  Current Vision: No visual deficits  Sensation:  Light Touch: No apparent deficits  Strength:  Strength Comments: BUE appear >/=3+/5 as demo in function, not formally assessed 2/2 abd precautions  Perception:  Inattention/Neglect: Appears intact  Initiation: Appears intact  Motor Planning: Appears intact  Perseveration: Not present  Coordination:  Movements are Fluid and Coordinated: Yes   Hand Function:  Gross Grasp: Functional  Coordination: Functional  Extremities: RUE   RUE : Within Functional Limits and LUE   LUE: Within Functional Limits      Outcome Measures:[facility] Daily Activity  Putting on and taking off regular lower body clothing: A little  Bathing (including washing, rinsing, drying): A little  Putting on and taking off regular upper body clothing: A little  Toileting, which includes using toilet, bedpan or urinal: A little  Taking care of personal grooming such as brushing teeth: A little  Eating Meals: None  Daily Activity - Total Score: 19         and Brief Confusion Assessment Method (bCAM)  CAM Result: CAM -    Education Documentation  Body Mechanics, taught by Delores Alvarez OT at 4/22/2025  2:28 PM.  Learner: Patient  Readiness: Acceptance  Method: Explanation  Response: Verbalizes Understanding    Precautions, taught by Delores Alvarez OT at 4/22/2025  2:28 PM.  Learner: Patient  Readiness: Acceptance  Method: Explanation  Response: Verbalizes Understanding    ADL Training, taught by Delores Alvarez OT at 4/22/2025  2:28 PM.  Learner: Patient  Readiness: Acceptance  Method: Explanation  Response: Verbalizes Understanding    Education Comments  No comments found.      Goals:  Encounter Problems       Encounter Problems (Active)       ADLs       Patient will complete daily grooming tasks with independent level of assistance and PRN adaptive equipment while  standing.       Start:  04/22/25    Expected End:  05/13/25            Patient will demonstrate lower body dressing with modified independent level of assistancedonning and doffing all LE clothes  with PRN adaptive equipment       Start:  04/22/25    Expected End:  05/13/25            Patient will perform toileting tasks, including hygiene and clothing management with independent level of assistance       Start:  04/22/25    Expected End:  05/13/25               BALANCE       pt will demonstrate dynamic standing greater than 8 mins with 1-2 UE release at mod I in prep for cooking and bathing tasks       Start:  04/22/25    Expected End:  05/13/25               COGNITION/SAFETY       Patient will recall and adhere to abdominal precautions within all ADLs and functional mobility in order to promote healing and safety with functional tasks        Start:  04/22/25    Expected End:  05/13/25               EXERCISE/STRENGTHENING       pt will be IND with BUE HEP for increasing functional strength and activity tolerance required for ADL completion       Start:  04/22/25    Expected End:  05/13/25               MOBILITY       pt will safely demonstrate functional mobility, to/from bathroom and at other household distances, navigating around environmental barriers with LRD and mod I        Start:  04/22/25    Expected End:  05/13/25 04/22/25 at 2:37 PM   Delores Alvarez OT   Rehab Office: [phone]

## 2025-04-23 ENCOUNTER — APPOINTMENT (OUTPATIENT)
Dept: DIALYSIS | Facility: HOSPITAL | Age: 40
End: 2025-04-23
Payer: COMMERCIAL

## 2025-04-23 ENCOUNTER — PHARMACY VISIT (OUTPATIENT)
Dept: PHARMACY | Facility: CLINIC | Age: 40
End: 2025-04-23
Payer: COMMERCIAL

## 2025-04-23 ENCOUNTER — APPOINTMENT (OUTPATIENT)
Dept: RADIOLOGY | Facility: HOSPITAL | Age: 40
End: 2025-04-23
Payer: COMMERCIAL

## 2025-04-23 DIAGNOSIS — Z94.0 KIDNEY REPLACED BY TRANSPLANT (HHS-HCC): ICD-10-CM

## 2025-04-23 LAB
ALBUMIN SERPL BCP-MCNC: 3.6 G/DL (ref 3.4–5)
ANION GAP SERPL CALC-SCNC: 20 MMOL/L (ref 10–20)
BASOPHILS # BLD AUTO: 0 X10*3/UL (ref 0–0.1)
BASOPHILS NFR BLD AUTO: 0 %
BUN SERPL-MCNC: 84 MG/DL (ref 6–23)
CA-I BLD-SCNC: 0.96 MMOL/L (ref 1.1–1.33)
CALCIUM SERPL-MCNC: 8.4 MG/DL (ref 8.6–10.6)
CHLORIDE SERPL-SCNC: 92 MMOL/L (ref 98–107)
CO2 SERPL-SCNC: 25 MMOL/L (ref 21–32)
CREAT SERPL-MCNC: 13.47 MG/DL (ref 0.5–1.3)
EGFRCR SERPLBLD CKD-EPI 2021: 4 ML/MIN/1.73M*2
EOSINOPHIL # BLD AUTO: 0 X10*3/UL (ref 0–0.7)
EOSINOPHIL NFR BLD AUTO: 0 %
ERYTHROCYTE [DISTWIDTH] IN BLOOD BY AUTOMATED COUNT: 16.7 % (ref 11.5–14.5)
GLUCOSE BLD MANUAL STRIP-MCNC: 235 MG/DL (ref 74–99)
GLUCOSE BLD MANUAL STRIP-MCNC: 242 MG/DL (ref 74–99)
GLUCOSE BLD MANUAL STRIP-MCNC: 270 MG/DL (ref 74–99)
GLUCOSE SERPL-MCNC: 242 MG/DL (ref 74–99)
HCT VFR BLD AUTO: 25.4 % (ref 41–52)
HGB BLD-MCNC: 8.4 G/DL (ref 13.5–17.5)
IMM GRANULOCYTES # BLD AUTO: 0.12 X10*3/UL (ref 0–0.7)
IMM GRANULOCYTES NFR BLD AUTO: 2.4 % (ref 0–0.9)
LYMPHOCYTES # BLD AUTO: 0.27 X10*3/UL (ref 1.2–4.8)
LYMPHOCYTES NFR BLD AUTO: 5.4 %
MAGNESIUM SERPL-MCNC: 2.35 MG/DL (ref 1.6–2.4)
MCH RBC QN AUTO: 24.1 PG (ref 26–34)
MCHC RBC AUTO-ENTMCNC: 33.1 G/DL (ref 32–36)
MCV RBC AUTO: 73 FL (ref 80–100)
MONOCYTES # BLD AUTO: 0.63 X10*3/UL (ref 0.1–1)
MONOCYTES NFR BLD AUTO: 12.6 %
NEUTROPHILS # BLD AUTO: 3.97 X10*3/UL (ref 1.2–7.7)
NEUTROPHILS NFR BLD AUTO: 79.6 %
NRBC BLD-RTO: 0.6 /100 WBCS (ref 0–0)
PHOSPHATE SERPL-MCNC: 6.3 MG/DL (ref 2.5–4.9)
PLATELET # BLD AUTO: 83 X10*3/UL (ref 150–450)
POTASSIUM SERPL-SCNC: 3.8 MMOL/L (ref 3.5–5.3)
RBC # BLD AUTO: 3.49 X10*6/UL (ref 4.5–5.9)
SODIUM SERPL-SCNC: 133 MMOL/L (ref 136–145)
TACROLIMUS BLD-MCNC: 6.6 NG/ML
WBC # BLD AUTO: 5 X10*3/UL (ref 4.4–11.3)

## 2025-04-23 PROCEDURE — 2500000002 HC RX 250 W HCPCS SELF ADMINISTERED DRUGS (ALT 637 FOR MEDICARE OP, ALT 636 FOR OP/ED)

## 2025-04-23 PROCEDURE — 99233 SBSQ HOSP IP/OBS HIGH 50: CPT | Performed by: PHYSICIAN ASSISTANT

## 2025-04-23 PROCEDURE — 80069 RENAL FUNCTION PANEL: CPT | Performed by: PHYSICIAN ASSISTANT

## 2025-04-23 PROCEDURE — 2500000004 HC RX 250 GENERAL PHARMACY W/ HCPCS (ALT 636 FOR OP/ED): Performed by: PHYSICIAN ASSISTANT

## 2025-04-23 PROCEDURE — 83735 ASSAY OF MAGNESIUM: CPT | Performed by: PHYSICIAN ASSISTANT

## 2025-04-23 PROCEDURE — 99233 SBSQ HOSP IP/OBS HIGH 50: CPT | Performed by: INTERNAL MEDICINE

## 2025-04-23 PROCEDURE — 2500000004 HC RX 250 GENERAL PHARMACY W/ HCPCS (ALT 636 FOR OP/ED): Performed by: STUDENT IN AN ORGANIZED HEALTH CARE EDUCATION/TRAINING PROGRAM

## 2025-04-23 PROCEDURE — 1100000001 HC PRIVATE ROOM DAILY

## 2025-04-23 PROCEDURE — 2500000004 HC RX 250 GENERAL PHARMACY W/ HCPCS (ALT 636 FOR OP/ED)

## 2025-04-23 PROCEDURE — 99232 SBSQ HOSP IP/OBS MODERATE 35: CPT | Performed by: STUDENT IN AN ORGANIZED HEALTH CARE EDUCATION/TRAINING PROGRAM

## 2025-04-23 PROCEDURE — 2500000001 HC RX 250 WO HCPCS SELF ADMINISTERED DRUGS (ALT 637 FOR MEDICARE OP)

## 2025-04-23 PROCEDURE — 8010000001 HC DIALYSIS - HEMODIALYSIS PER DAY

## 2025-04-23 PROCEDURE — RXMED WILLOW AMBULATORY MEDICATION CHARGE

## 2025-04-23 PROCEDURE — 76776 US EXAM K TRANSPL W/DOPPLER: CPT | Performed by: RADIOLOGY

## 2025-04-23 PROCEDURE — 85025 COMPLETE CBC W/AUTO DIFF WBC: CPT | Performed by: PHYSICIAN ASSISTANT

## 2025-04-23 PROCEDURE — 2500000001 HC RX 250 WO HCPCS SELF ADMINISTERED DRUGS (ALT 637 FOR MEDICARE OP): Performed by: PHYSICIAN ASSISTANT

## 2025-04-23 PROCEDURE — 80197 ASSAY OF TACROLIMUS: CPT | Performed by: PHYSICIAN ASSISTANT

## 2025-04-23 PROCEDURE — 76776 US EXAM K TRANSPL W/DOPPLER: CPT

## 2025-04-23 PROCEDURE — 82330 ASSAY OF CALCIUM: CPT | Performed by: PHYSICIAN ASSISTANT

## 2025-04-23 PROCEDURE — 2500000002 HC RX 250 W HCPCS SELF ADMINISTERED DRUGS (ALT 637 FOR MEDICARE OP, ALT 636 FOR OP/ED): Performed by: PHYSICIAN ASSISTANT

## 2025-04-23 PROCEDURE — 82947 ASSAY GLUCOSE BLOOD QUANT: CPT

## 2025-04-23 PROCEDURE — 2500000002 HC RX 250 W HCPCS SELF ADMINISTERED DRUGS (ALT 637 FOR MEDICARE OP, ALT 636 FOR OP/ED): Performed by: NURSE PRACTITIONER

## 2025-04-23 PROCEDURE — 2500000001 HC RX 250 WO HCPCS SELF ADMINISTERED DRUGS (ALT 637 FOR MEDICARE OP): Performed by: STUDENT IN AN ORGANIZED HEALTH CARE EDUCATION/TRAINING PROGRAM

## 2025-04-23 PROCEDURE — 36415 COLL VENOUS BLD VENIPUNCTURE: CPT | Performed by: PHYSICIAN ASSISTANT

## 2025-04-23 RX ORDER — CARVEDILOL 12.5 MG/1
12.5 TABLET ORAL 2 TIMES DAILY
Status: DISCONTINUED | OUTPATIENT
Start: 2025-04-23 | End: 2025-04-26 | Stop reason: HOSPADM

## 2025-04-23 RX ORDER — POLYETHYLENE GLYCOL 3350 17 G/17G
17 POWDER, FOR SOLUTION ORAL DAILY PRN
Status: DISCONTINUED | OUTPATIENT
Start: 2025-04-23 | End: 2025-04-26 | Stop reason: HOSPADM

## 2025-04-23 RX ORDER — ACETAMINOPHEN 160 MG/5ML
650 SOLUTION ORAL EVERY 4 HOURS PRN
Status: DISCONTINUED | OUTPATIENT
Start: 2025-04-23 | End: 2025-04-24

## 2025-04-23 RX ORDER — SENNOSIDES 8.6 MG/1
1 TABLET ORAL DAILY PRN
Status: DISCONTINUED | OUTPATIENT
Start: 2025-04-23 | End: 2025-04-26 | Stop reason: HOSPADM

## 2025-04-23 RX ORDER — ACETAMINOPHEN 650 MG/1
650 SUPPOSITORY RECTAL EVERY 4 HOURS PRN
Status: DISCONTINUED | OUTPATIENT
Start: 2025-04-23 | End: 2025-04-24

## 2025-04-23 RX ORDER — INSULIN LISPRO 100 [IU]/ML
15 INJECTION, SOLUTION INTRAVENOUS; SUBCUTANEOUS
Status: DISCONTINUED | OUTPATIENT
Start: 2025-04-23 | End: 2025-04-25

## 2025-04-23 RX ORDER — ACETAMINOPHEN 325 MG/1
650 TABLET ORAL EVERY 4 HOURS PRN
Status: DISCONTINUED | OUTPATIENT
Start: 2025-04-23 | End: 2025-04-24

## 2025-04-23 RX ORDER — CALCIUM GLUCONATE 20 MG/ML
1 INJECTION, SOLUTION INTRAVENOUS EVERY 4 HOURS
Status: COMPLETED | OUTPATIENT
Start: 2025-04-23 | End: 2025-04-23

## 2025-04-23 RX ORDER — INSULIN LISPRO 100 [IU]/ML
10 INJECTION, SOLUTION INTRAVENOUS; SUBCUTANEOUS
Status: DISCONTINUED | OUTPATIENT
Start: 2025-04-23 | End: 2025-04-25

## 2025-04-23 RX ORDER — INSULIN GLARGINE 100 [IU]/ML
30 INJECTION, SOLUTION SUBCUTANEOUS DAILY
Status: DISCONTINUED | OUTPATIENT
Start: 2025-04-24 | End: 2025-04-26 | Stop reason: HOSPADM

## 2025-04-23 RX ORDER — HEPARIN SODIUM 5000 [USP'U]/ML
7500 INJECTION, SOLUTION INTRAVENOUS; SUBCUTANEOUS EVERY 8 HOURS SCHEDULED
Status: DISCONTINUED | OUTPATIENT
Start: 2025-04-23 | End: 2025-04-26 | Stop reason: HOSPADM

## 2025-04-23 RX ORDER — NAPROXEN SODIUM 220 MG/1
81 TABLET, FILM COATED ORAL DAILY
Status: DISCONTINUED | OUTPATIENT
Start: 2025-04-23 | End: 2025-04-26 | Stop reason: HOSPADM

## 2025-04-23 RX ADMIN — PANTOPRAZOLE SODIUM 40 MG: 40 TABLET, DELAYED RELEASE ORAL at 06:48

## 2025-04-23 RX ADMIN — FUROSEMIDE 80 MG: 40 TABLET ORAL at 08:42

## 2025-04-23 RX ADMIN — ASPIRIN 81 MG: 81 TABLET, CHEWABLE ORAL at 14:03

## 2025-04-23 RX ADMIN — HEPARIN SODIUM 5000 UNITS: 5000 INJECTION, SOLUTION INTRAVENOUS; SUBCUTANEOUS at 06:48

## 2025-04-23 RX ADMIN — VALGANCICLOVIR HYDROCHLORIDE 450 MG: 450 TABLET ORAL at 08:42

## 2025-04-23 RX ADMIN — ACETAMINOPHEN 650 MG: 325 TABLET, FILM COATED ORAL at 21:31

## 2025-04-23 RX ADMIN — Medication 25 MCG: at 08:42

## 2025-04-23 RX ADMIN — CLOTRIMAZOLE 10 MG: 10 LOZENGE ORAL at 08:42

## 2025-04-23 RX ADMIN — HEPARIN SODIUM 7500 UNITS: 5000 INJECTION, SOLUTION INTRAVENOUS; SUBCUTANEOUS at 21:31

## 2025-04-23 RX ADMIN — TACROLIMUS 3 MG: 1 CAPSULE ORAL at 06:48

## 2025-04-23 RX ADMIN — PANTOPRAZOLE SODIUM 40 MG: 40 TABLET, DELAYED RELEASE ORAL at 17:09

## 2025-04-23 RX ADMIN — SODIUM BICARBONATE 650 MG: 650 TABLET ORAL at 21:31

## 2025-04-23 RX ADMIN — SODIUM BICARBONATE 650 MG: 650 TABLET ORAL at 08:42

## 2025-04-23 RX ADMIN — ATORVASTATIN CALCIUM 80 MG: 80 TABLET, FILM COATED ORAL at 21:31

## 2025-04-23 RX ADMIN — MYCOPHENOLATE MOFETIL 1000 MG: 250 CAPSULE ORAL at 18:44

## 2025-04-23 RX ADMIN — SULFAMETHOXAZOLE AND TRIMETHOPRIM 1 TABLET: 400; 80 TABLET ORAL at 08:42

## 2025-04-23 RX ADMIN — ACETAMINOPHEN 650 MG: 325 TABLET, FILM COATED ORAL at 06:48

## 2025-04-23 RX ADMIN — INSULIN GLARGINE 20 UNITS: 100 INJECTION, SOLUTION SUBCUTANEOUS at 08:41

## 2025-04-23 RX ADMIN — PREDNISONE 20 MG: 10 TABLET ORAL at 08:42

## 2025-04-23 RX ADMIN — CARVEDILOL 12.5 MG: 12.5 TABLET, FILM COATED ORAL at 14:03

## 2025-04-23 RX ADMIN — MYCOPHENOLATE MOFETIL 1000 MG: 250 CAPSULE ORAL at 06:48

## 2025-04-23 RX ADMIN — CARVEDILOL 12.5 MG: 12.5 TABLET, FILM COATED ORAL at 21:31

## 2025-04-23 RX ADMIN — INSULIN LISPRO 4 UNITS: 100 INJECTION, SOLUTION INTRAVENOUS; SUBCUTANEOUS at 21:31

## 2025-04-23 RX ADMIN — CLOTRIMAZOLE 10 MG: 10 LOZENGE ORAL at 18:44

## 2025-04-23 RX ADMIN — TRAMADOL HYDROCHLORIDE 50 MG: 50 TABLET, COATED ORAL at 14:03

## 2025-04-23 RX ADMIN — INSULIN LISPRO 10 UNITS: 100 INJECTION, SOLUTION INTRAVENOUS; SUBCUTANEOUS at 08:43

## 2025-04-23 RX ADMIN — TACROLIMUS 3 MG: 1 CAPSULE ORAL at 18:44

## 2025-04-23 RX ADMIN — FUROSEMIDE 80 MG: 40 TABLET ORAL at 17:09

## 2025-04-23 RX ADMIN — INSULIN LISPRO 10 UNITS: 100 INJECTION, SOLUTION INTRAVENOUS; SUBCUTANEOUS at 17:09

## 2025-04-23 RX ADMIN — CALCIUM GLUCONATE 1 G: 20 INJECTION, SOLUTION INTRAVENOUS at 17:08

## 2025-04-23 RX ADMIN — INSULIN LISPRO 6 UNITS: 100 INJECTION, SOLUTION INTRAVENOUS; SUBCUTANEOUS at 08:41

## 2025-04-23 RX ADMIN — HEPARIN SODIUM 7500 UNITS: 5000 INJECTION, SOLUTION INTRAVENOUS; SUBCUTANEOUS at 14:03

## 2025-04-23 RX ADMIN — CALCIUM GLUCONATE 1 G: 20 INJECTION, SOLUTION INTRAVENOUS at 17:51

## 2025-04-23 RX ADMIN — INSULIN LISPRO 4 UNITS: 100 INJECTION, SOLUTION INTRAVENOUS; SUBCUTANEOUS at 17:09

## 2025-04-23 ASSESSMENT — ENCOUNTER SYMPTOMS
BLOOD IN STOOL: 0
POLYDIPSIA: 0
RHINORRHEA: 0
HEMATURIA: 0
SORE THROAT: 0
ABDOMINAL DISTENTION: 0
HALLUCINATIONS: 0
AGITATION: 0
NECK PAIN: 0
DIZZINESS: 0
SHORTNESS OF BREATH: 0
NECK STIFFNESS: 0
POLYPHAGIA: 0
FATIGUE: 1
CHILLS: 0
FACIAL ASYMMETRY: 0
PHOTOPHOBIA: 0
DYSURIA: 0
PALPITATIONS: 0
COUGH: 0

## 2025-04-23 ASSESSMENT — PAIN - FUNCTIONAL ASSESSMENT
PAIN_FUNCTIONAL_ASSESSMENT: NO/DENIES PAIN
PAIN_FUNCTIONAL_ASSESSMENT: 0-10
PAIN_FUNCTIONAL_ASSESSMENT: 0-10

## 2025-04-23 ASSESSMENT — PAIN SCALES - GENERAL
PAINLEVEL_OUTOF10: 3
PAINLEVEL_OUTOF10: 0 - NO PAIN
PAINLEVEL_OUTOF10: 0 - NO PAIN
PAINLEVEL_OUTOF10: 8

## 2025-04-23 NOTE — PROGRESS NOTES
"Tanmay Mcneill is a 39 y.o. male on day 5 of admission presenting with ESRD (end stage renal disease) (Multi).    HD today.   UOP slightly up.   No acute issues overnight.     Scheduled medications  aspirin, 81 mg, oral, Daily  atorvastatin, 80 mg, oral, Daily  carvedilol, 12.5 mg, oral, BID  cholecalciferol, 25 mcg, oral, Daily  clotrimazole, 10 mg, Mouth/Throat, TID after meals  furosemide, 80 mg, oral, BID  heparin (porcine), 7,500 Units, subcutaneous, q8h ANURADHA  [START ON 4/24/2025] insulin glargine, 30 Units, subcutaneous, Daily  insulin lispro, 0-10 Units, subcutaneous, Before meals & nightly  insulin lispro, 10 Units, subcutaneous, Daily with evening meal  insulin lispro, 15 Units, subcutaneous, BID AC  mycophenolate, 1,000 mg, oral, q12h ANURADHA  pantoprazole, 40 mg, oral, BID AC  predniSONE, 20 mg, oral, Daily  sodium bicarbonate, 650 mg, oral, BID  sulfamethoxazole-trimethoprim, 1 tablet, oral, Daily  tacrolimus, 3 mg, oral, q12h ANURADHA  valGANciclovir, 450 mg, oral, q48h     Continuous medications     PRN medications  PRN medications: acetaminophen **OR** acetaminophen **OR** acetaminophen, dextrose, dextrose, glucagon, glucagon, naloxone, ondansetron **OR** ondansetron, oxygen, polyethylene glycol, sennosides, traMADol, traMADol      Last Recorded Vitals  Blood pressure (!) 195/76, pulse 75, temperature 35.5 °C (95.9 °F), temperature source Temporal, resp. rate 16, height 1.854 m (6' 1\"), weight (!) 158 kg (348 lb 5.2 oz), SpO2 96%.  Intake/Output last 3 Shifts:  I/O last 3 completed shifts:  In: 1351.4 (8.6 mL/kg) [P.O.:840; IV Piggyback:511.4]  Out: 390 (2.5 mL/kg) [Urine:260 (0 mL/kg/hr); Drains:130]  Weight: 158 kg     A&ox3, no distress, pleasant  MMM, no lesions  Lungs with equal air entry, no added sounds  Rrr, no m/r/g  Abd soft, nt, nd  No allograft tenderness  No edema b/l    Results for orders placed or performed during the hospital encounter of 04/18/25 (from the past 24 hours)   POCT GLUCOSE   Result " Value Ref Range    POCT Glucose 214 (H) 74 - 99 mg/dL   Calcium, Ionized   Result Value Ref Range    POCT Calcium, Ionized 0.96 (L) 1.1 - 1.33 mmol/L   Magnesium   Result Value Ref Range    Magnesium 2.35 1.60 - 2.40 mg/dL   Renal Function Panel   Result Value Ref Range    Glucose 242 (H) 74 - 99 mg/dL    Sodium 133 (L) 136 - 145 mmol/L    Potassium 3.8 3.5 - 5.3 mmol/L    Chloride 92 (L) 98 - 107 mmol/L    Bicarbonate 25 21 - 32 mmol/L    Anion Gap 20 10 - 20 mmol/L    Urea Nitrogen 84 (H) 6 - 23 mg/dL    Creatinine 13.47 (H) 0.50 - 1.30 mg/dL    eGFR 4 (L) >60 mL/min/1.73m*2    Calcium 8.4 (L) 8.6 - 10.6 mg/dL    Phosphorus 6.3 (H) 2.5 - 4.9 mg/dL    Albumin 3.6 3.4 - 5.0 g/dL   CBC and Auto Differential   Result Value Ref Range    WBC 5.0 4.4 - 11.3 x10*3/uL    nRBC 0.6 (H) 0.0 - 0.0 /100 WBCs    RBC 3.49 (L) 4.50 - 5.90 x10*6/uL    Hemoglobin 8.4 (L) 13.5 - 17.5 g/dL    Hematocrit 25.4 (L) 41.0 - 52.0 %    MCV 73 (L) 80 - 100 fL    MCH 24.1 (L) 26.0 - 34.0 pg    MCHC 33.1 32.0 - 36.0 g/dL    RDW 16.7 (H) 11.5 - 14.5 %    Platelets 83 (L) 150 - 450 x10*3/uL    Neutrophils % 79.6 40.0 - 80.0 %    Immature Granulocytes %, Automated 2.4 (H) 0.0 - 0.9 %    Lymphocytes % 5.4 13.0 - 44.0 %    Monocytes % 12.6 2.0 - 10.0 %    Eosinophils % 0.0 0.0 - 6.0 %    Basophils % 0.0 0.0 - 2.0 %    Neutrophils Absolute 3.97 1.20 - 7.70 x10*3/uL    Immature Granulocytes Absolute, Automated 0.12 0.00 - 0.70 x10*3/uL    Lymphocytes Absolute 0.27 (L) 1.20 - 4.80 x10*3/uL    Monocytes Absolute 0.63 0.10 - 1.00 x10*3/uL    Eosinophils Absolute 0.00 0.00 - 0.70 x10*3/uL    Basophils Absolute 0.00 0.00 - 0.10 x10*3/uL   Tacrolimus   Result Value Ref Range    Tacrolimus  6.6 <=15.0 ng/mL   POCT GLUCOSE   Result Value Ref Range    POCT Glucose 270 (H) 74 - 99 mg/dL   POCT GLUCOSE   Result Value Ref Range    POCT Glucose 235 (H) 74 - 99 mg/dL       Assessment and Plan:    39 y.o.  male with ESRD sec to DM2, on HD since 1/19/2022 ([facility]  [place], MWF, [facility], Dr. Rhodes), HTN, obesity, asthma, arthritis currently s/p DDKT on 4/19/2025.  Anuric versus oliguric at baseline.  Been on dialysis for 3 years through right upper extremity AV fistula     Donor kidney info:  DCD, was lung tx recipient, Terminal Cr  1.08, KDPI: 48 %, PRA: 19 %, CMV: D-/R+, EBV: D+/R+, Toxo: Donor Negative, HCV Ab/MELISSA: Negative, HBcAB: Negative,  HBsAg/HBV MELISSA: Negative. Full cava used as venous extension 2/2 depth    Allograft function:  - Expecting DGF  - HD 4/21. No indication for RRT today, plan for HD 4/23. And then MWF.  - On PO Lasix 80mg bid.  - Ultrasound transplant unremarkable.  - 7-day Pimentel due to difficult Pimentel placement  - acceptable serum K, CO2. hypoNa noted- likely volume related  - Hb 8.4,. Monitor and transfuse as needed. ISAAC if remains <9.  - Hemodynamics: Stable Bps. Resume Coreg 12.5 mg bid. Monitor and titrate meds as indicated.    - CKD-MBD: Ca, Phos acceptable. Replete Mg as indicated  - avoid potential nephrotoxins. Dose meds for CrCl  - WBC, plt stable     Immunosuppression:  - Thymo 4.5 mg/kg total completed 4/22/25.   - On Tac, MMF 1g q12, pred taper per protocol. Goal FK 8-10.  - Ppx: Bactrim, Valcyte (CMV -/+), Mycelex troches.     Rest of the management per primary team. Will follow    Juan Miguel Feliz MD

## 2025-04-23 NOTE — NURSING NOTE
Report to Receiving RN:    Report To: [place] via secure chat  Time Report Called: 1330  Hand-Off Communication: Pt removed 2 L of fluid and post /41, P 75  Complications During Treatment: No  Ultrafiltration Treatment: Yes  Medications Administered During Dialysis: No  Blood Products Administered During Dialysis: No  Labs Sent During Dialysis: No  Heparin Drip Rate Changes: N/A  Dialysis Catheter Dressing: NA  Last Dressing Change: NA    E  Last Updated: 1:29 PM by SOFIA DELGADO

## 2025-04-23 NOTE — PROGRESS NOTES
"Tanmay Mcneill is a 39 y.o. male now POD # 4 s/p DDKT transplant.    Subjective   Doing well.  BRETT output minimal   + OOB       Objective   Physical Exam  Gen: A+OX3; NAD  Abd: S/NT/ND. Incision C/D/I.  Drains: Serosanguinous  x2  Ext: trace LE edema    Last Recorded Vitals  Blood pressure 168/70, pulse 73, temperature 35.2 °C (95.4 °F), temperature source Temporal, resp. rate 16, height 1.854 m (6' 1\"), weight (!) 158 kg (348 lb 5.2 oz), SpO2 96%.  Intake/Output last 3 Shifts:  I/O last 3 completed shifts:  In: 1351.4 (8.6 mL/kg) [P.O.:840; IV Piggyback:511.4]  Out: 390 (2.5 mL/kg) [Urine:260 (0 mL/kg/hr); Drains:130]  Weight: 158 kg      Lab Results   Component Value Date    CREATININE 13.47 (H) 04/23/2025    K 3.8 04/23/2025    GLUCOSE 242 (H) 04/23/2025    HGB 8.4 (L) 04/23/2025    WBC 5.0 04/23/2025    PLT 83 (L) 04/23/2025    CALCIUM 8.4 (L) 04/23/2025       Current Medications[1]     Assessment/Plan    ESRD 2/2 DM and HTN    S/p DDKT 4/19/25   DCD donor, KDPI 48%  Full cava used as venous extension 2/2 depth  DGF, HD  M,W,F  DC BRETT x 1  Cont DVT PPX   Encourage OOB  Pimentel 7 days - difficult placement/false passage. Urology scope to place  Initial US and repeat good    Immunosuppression   PRA 19%  Goal thymo 4.5mg/kg - completed  Tacrolimus - cont 2/2  MMF 1 g BID  Steroid taper    3. Diabetes  Appreciate endocrine    4. Ppx  CMV: D-/R+ - valcyte  EBV: D+/R+  Bactrim    Donor Kidney Info:  Etiology:  DCD  Risk: no but was lung tx recipient  Terminal Cr  1.08   KDPI: 48 %  PRA: 19 %  CMV: D-/R+  EBV: D+/R+  Toxo: Donor Negative  HCV Ab/MELISSA: Negative  HBcAB: Negative  HBsAg/HBV MELISSA: Negative     I spent 35 minutes in the professional and overall care of this patient.      Doris Ontiveros MD         [1]   Current Facility-Administered Medications:     acetaminophen (Tylenol) tablet 650 mg, 650 mg, oral, q4h PRN, 650 mg at 04/23/25 0648 **OR** acetaminophen (Tylenol) oral liquid 650 mg, 650 mg, nasogastric tube, q4h " PRN **OR** acetaminophen (Tylenol) suppository 650 mg, 650 mg, rectal, q4h PRN, Svitlana Soto MD    aspirin chewable tablet 81 mg, 81 mg, oral, Daily, Joanna Rdz PA-C    atorvastatin (Lipitor) tablet 80 mg, 80 mg, oral, Daily, YONIS Becerra, 80 mg at 04/22/25 2138    calcium gluconate 1 g in sodium chloride (iso) IV 50 mL, 1 g, intravenous, q4h, Joanna Rdz PA-C    carvedilol (Coreg) tablet 12.5 mg, 12.5 mg, oral, BID, Joanna Rdz PA-C    cholecalciferol (Vitamin D-3) tablet 25 mcg, 25 mcg, oral, Daily, Joanna Rdz PA-C, 25 mcg at 04/23/25 0842    clotrimazole (Mycelex) villa 10 mg, 10 mg, Mouth/Throat, TID after meals, Doris Ontiveros MD, 10 mg at 04/23/25 0842    dextrose 50 % injection 12.5 g, 12.5 g, intravenous, q15 min PRN, YONIS Oliver    dextrose 50 % injection 25 g, 25 g, intravenous, q15 min PRN, YONIS Oliver    furosemide (Lasix) tablet 80 mg, 80 mg, oral, BID, Joanna Rdz PA-C, 80 mg at 04/23/25 0842    glucagon (Glucagen) injection 1 mg, 1 mg, intramuscular, q15 min PRN, YONIS Oliver    glucagon (Glucagen) injection 1 mg, 1 mg, intramuscular, q15 min PRN, Disha Maynard PA-C    heparin (porcine) injection 7,500 Units, 7,500 Units, subcutaneous, q8h ANURADHA, Joanna Rdz PA-C    [START ON 4/24/2025] insulin glargine (Lantus) injection 30 Units, 30 Units, subcutaneous, Daily, Disha Maynard PA-C    insulin lispro injection 0-10 Units, 0-10 Units, subcutaneous, Before meals & nightly, Disha Maynard PA-C, 6 Units at 04/23/25 0841    insulin lispro injection 10 Units, 10 Units, subcutaneous, Daily with evening meal, Disha Maynard PA-C    insulin lispro injection 15 Units, 15 Units, subcutaneous, BID AC, Disha Maynard PA-C    mycophenolate (Cellcept) capsule 1,000 mg, 1,000 mg, oral, q12h [facility], Neha Edouard MD, 1,000 mg at 04/23/25 0648    naloxone (Narcan) injection  0.2 mg, 0.2 mg, intravenous, q5 min PRN, Doris Ontiveros MD    ondansetron (Zofran) tablet 4 mg, 4 mg, oral, q8h PRN **OR** ondansetron (Zofran) injection 4 mg, 4 mg, intravenous, q8h PRN, Doris Ontiveros MD    oxygen (O2) therapy, , inhalation, Continuous PRN - O2/gases, Doris Ontiveros MD    pantoprazole (ProtoNix) EC tablet 40 mg, 40 mg, oral, BID AC, Doris Ontiveros MD, 40 mg at 04/23/25 0648    polyethylene glycol (Glycolax, Miralax) packet 17 g, 17 g, oral, Daily, Doris Ontiveros MD, 17 g at 04/20/25 1451    predniSONE (Deltasone) tablet 20 mg, 20 mg, oral, Daily, Doris Ontiveros MD, 20 mg at 04/23/25 0842    sennosides (Senokot) tablet 8.6 mg, 1 tablet, oral, BID, Doris Ontiveros MD, 8.6 mg at 04/21/25 2112    sodium bicarbonate tablet 650 mg, 650 mg, oral, BID, YONIS Becerra, 650 mg at 04/23/25 0842    sulfamethoxazole-trimethoprim (Bactrim) 400-80 mg per tablet 1 tablet, 1 tablet, oral, Daily, Joanna Allenstneliab, PA-C, 1 tablet at 04/23/25 0842    tacrolimus (Prograf) capsule 3 mg, 3 mg, oral, q12h [facility], SO Torres-CNP, 3 mg at 04/23/25 0648    traMADol (Ultram) tablet 25 mg, 25 mg, oral, q12h PRN, Joanna C [place], PA-C    traMADol (Ultram) tablet 50 mg, 50 mg, oral, q12h PRN, Ojanna C [place]stubb, PA-C, 50 mg at 04/22/25 2255    valGANciclovir (Valcyte) tablet 450 mg, 450 mg, oral, q48h, SO Becerra-CNP, 450 mg at 04/23/25 0842

## 2025-04-23 NOTE — CARE PLAN
The patient's goals for the shift include rest and comfort     The clinical goals for the shift include pt will remain HDS this shift

## 2025-04-23 NOTE — PROGRESS NOTES
Tanmay Mcneill is a 39 y.o. male on day 5 of admission presenting with ESRD (end stage renal disease) (Multi).      Transitional Care Coordination Progress Note:  Patient discussed during interdisciplinary rounds.      Plan per Medical/Surgical team:    Home Care choice for home going needs, [facility].  Pt Needs: PT/OT/RN for hyatt.   Requested [facility] referral.   4/23: Home. No home going needs anticipated for this pt at this time.          Discharge disposition: Home. No home going needs anticipated for this pt at this time.       Potential Barriers: None     ADOD:  2/27     This TCC will continue to follow for home going needs and safe DC plan.      NARAYAN SANDOVAL

## 2025-04-23 NOTE — CARE PLAN
Problem: Pain - Adult  Goal: Verbalizes/displays adequate comfort level or baseline comfort level  Outcome: Progressing  Flowsheets (Taken 4/23/2025 0830)  Verbalizes/displays adequate comfort level or baseline comfort level:   Encourage patient to monitor pain and request assistance   Assess pain using appropriate pain scale   Administer analgesics based on type and severity of pain and evaluate response   Implement non-pharmacological measures as appropriate and evaluate response   Consider cultural and social influences on pain and pain management   Notify Licensed Independent Practitioner if interventions unsuccessful or patient reports new pain     Problem: Safety - Adult  Goal: Free from fall injury  Outcome: Progressing  Flowsheets (Taken 4/23/2025 0830)  Free from fall injury:   Instruct family/caregiver on patient safety   Based on caregiver fall risk screen, instruct family/caregiver to ask for assistance with transferring infant if caregiver noted to have fall risk factors     Problem: Discharge Planning  Goal: Discharge to home or other facility with appropriate resources  Outcome: Progressing  Flowsheets (Taken 4/23/2025 0830)  Discharge to home or other facility with appropriate resources: Identify barriers to discharge with patient and caregiver   The patient's goals for the shift include      The clinical goals for the shift include pt will remain HDS this shift    Over the shift, the patient did remain HDS throughout the shift

## 2025-04-23 NOTE — SIGNIFICANT EVENT
04/23/25 1246   Patient Interaction   Organ Kidney   Type of Interaction Morning rounds   Interdisciplinary Rounds   Attendance Surgeon;Physician;NOEL;Coordinator;Pharmacist   Topics Discussed Diet;Home care needs;Medications;Blood test results;Discharge preparation;Activity;Imaging/test results     Transplant Surgery Multidisciplinary Team Note    Tanmay Mcneill is a 39 y.o. male  POD#4 from a Kidney from a DCD donor. His post operative complications: Need for dialysis        24 Hour Events  1. No acute events     Last Recorded Vitals  Visit Vitals  BP (!) 195/76   Pulse 77   Temp 35.2 °C (95.4 °F) (Temporal)   Resp 16      Intake/Output last 3 Shifts:    Intake/Output Summary (Last 24 hours) at 4/23/2025 1247  Last data filed at 4/23/2025 0923  Gross per 24 hour   Intake 951.4 ml   Output 190 ml   Net 761.4 ml      Vitals:    04/23/25 0605   Weight: (!) 158 kg (348 lb 5.2 oz)        Assessment/Plan   Kidney allograft function  -DGF   -HD today  -UOP increasing -- 220 ml (from 40 ml yesterday)  -Repeat DUS kidney today     Cardiac  -PMH of Hypertension  -Re-start Coreg 12.5 mg BID   -Start ASA 81 mg   -Increase SQH 7500 units TID      Immunosuppression/PPX  -Completed Thymo 4.5 mg/kg total     Hyperkalemia--> improved to 3.8  -Lokelma stopped       Principal Problem:    Management after organ transplant  Active Problems:  Problem List[1]     Immunosuppression reviewed and adjusted              Induction: Thymoglobulin Full Dose              Tacrolimus goal 8-10 ng/mL. Current dose 3 mg BID                MMF 1000 mg po BID              Solumedrol taper  DVT prophylaxis SCDS and subcutaneous heparin 5000 TID  PT/OT  Diet:  carb controlled, renal, low phos   Anticipated discharge Saturday     Joanna Rdz PA-C             [1]   Patient Active Problem List  Diagnosis    Acid reflux    Arteriovenous graft for hemodialysis in place, primary    Asthma    CKD (chronic kidney disease), stage V (Multi)    Chronic  pain of right knee    Dyslipidemia    Hypertension    Morbid obesity (Multi)    Osteoarthritis    Snoring    Type 2 diabetes mellitus    Vitamin D deficiency    Transplant    Preop cardiovascular exam    ESRD (end stage renal disease) (Multi)    Type 2 diabetes mellitus with hyperglycemia, with long-term current use of insulin    Steroid-induced hyperglycemia

## 2025-04-23 NOTE — NURSING NOTE
Report from Sending RN:    Report From: ARELY Levin   Recent Surgery of Procedure: Yes, 04/19/25 - DDKT  Baseline Level of Consciousness (LOC): a/o x 4   Oxygen Use: No  Type: prn  Diabetic: Yes, am 270  Last BP Med Given Day of Dialysis: na  Last Pain Med Given: see MAR - sched tylenol ; Tramadol at HS  Lab Tests to be Obtained with Dialysis: No  Blood Transfusion to be Given During Dialysis: No  Available IV Access: Yes, #20 LFA  Medications to be Administered During Dialysis: No  Continuous IV Infusion Running: No  Restraints on Currently or in the Last 24 Hours: No  Hand-Off Communication: No issues. Able to stand.  No tele required.   Dialysis Catheter Dressing: na - AVF R  Last Dressing Change: na

## 2025-04-23 NOTE — PROGRESS NOTES
Tanmay Mcneill is a 39 y.o. male on day 5 of admission presenting with ESRD (end stage renal disease) (Multi).    Subjective   Pt seen and evaluated in HD. Pt was informed of plan to increase both lispro and glargine.   Appetite is improving, eating small amounts.   Mallory 3 placed 4/22/25.     I have reviewed histories, allergies and medications have been reviewed and there are no changes       Objective   Review of Systems   Constitutional:  Positive for fatigue. Negative for chills.   HENT:  Negative for rhinorrhea and sore throat.    Eyes:  Negative for photophobia and visual disturbance.   Respiratory:  Negative for cough and shortness of breath.    Cardiovascular:  Negative for chest pain and palpitations.   Gastrointestinal:  Negative for abdominal distention and blood in stool.   Endocrine: Negative for polydipsia, polyphagia and polyuria.   Genitourinary:  Negative for dysuria and hematuria.   Musculoskeletal:  Negative for neck pain and neck stiffness.   Neurological:  Negative for dizziness and facial asymmetry.   Psychiatric/Behavioral:  Negative for agitation and hallucinations.      Physical Exam  Constitutional:       General: He is not in acute distress.     Appearance: He is not ill-appearing.   HENT:      Head: Normocephalic.   Eyes:      Extraocular Movements: Extraocular movements intact.   Cardiovascular:      Rate and Rhythm: Normal rate and regular rhythm.      Pulses: Normal pulses.      Heart sounds: Normal heart sounds. No murmur heard.  Pulmonary:      Effort: Pulmonary effort is normal. No respiratory distress.      Breath sounds: Normal breath sounds.   Abdominal:      General: Abdomen is flat.      Palpations: Abdomen is soft.   Musculoskeletal:         General: Normal range of motion.   Skin:     General: Skin is warm and dry.   Neurological:      General: No focal deficit present.      Mental Status: He is alert and oriented to person, place, and time.   Psychiatric:         Mood and  "Affect: Mood normal.         Behavior: Behavior normal.         Thought Content: Thought content normal.         Judgment: Judgment normal.     Last Recorded Vitals  Blood pressure (!) 195/76, pulse 75, temperature 35.5 °C (95.9 °F), temperature source Temporal, resp. rate 16, height 1.854 m (6' 1\"), weight (!) 158 kg (348 lb 5.2 oz), SpO2 96%.  Intake/Output last 3 Shifts:  I/O last 3 completed shifts:  In: 1351.4 (8.6 mL/kg) [P.O.:840; IV Piggyback:511.4]  Out: 390 (2.5 mL/kg) [Urine:260 (0 mL/kg/hr); Drains:130]  Weight: 158 kg     Relevant Results  Results from last 7 days   Lab Units 25  0747 25  0635 25  2002 25  1605 25  1107 25  0723 25  0535 25  1626 25  0528 25  1629 25  1322 25  0640 25  0524   POCT GLUCOSE mg/dL 270*  --  214* 145* 224* 292*  --    < >  --    < >  --    < >  --    GLUCOSE mg/dL  --  242*  --   --   --   --  298*  --  233*  --  201*  --  172*    < > = values in this interval not displayed.     Scheduled medications  Scheduled Medications[1]  Continuous medications  Continuous Medications[2]  PRN medications  PRN Medications[3]    Assessment & Plan  ESRD (end stage renal disease) (Multi)    Type 2 diabetes mellitus with hyperglycemia, with long-term current use of insulin  History Of Present Illness  Tanmay Mcneill is a 39 y.o. male presenting with PMHx significant for ESRD 2/2 T2DM, as well as HTN, asthma, arthritis, and obesity who presents for  donor kidney transplant.      Diabetes History  Type of diabetes: 2  Year diagnosed or age: several years ago, first emr mention in 2018  Hospitalizations for DKA or HHS: none  Complications: ESRD now s/p DDKT, obesity  Seen by PCP or Endocrinology: PCP  Frequency of glucose checks: 0  Glucose review: no home record  Frequency of Hypoglycemia: none  Hypoglycemia unawareness: no  Severe hypoglycemia requiring assistance from others:  no     Home " Medications  GLP-1: mounjaro 2.5mg x5 weeks so far  CGM: none  Previous meds: ozempic (GI upset), glargine 8u/day                Hemoglobin A1C   Date/Time Value Ref Range Status   02/27/2025 10:54 AM 7.8 (H) 4.3 - 5.6 % Final       Comment:       American Diabetes Association guidelines indicate that patients with HgbA1c in the range 5.7-6.4% are at increased risk for development of diabetes, and intervention by lifestyle modification may be beneficial. HgbA1c greater or equal to 6.5% is considered diagnostic of diabetes.   04/02/2024 02:36 PM 7.2 (H) see below % Final      Steroid-induced hyperglycemia    PLAN  Steroids:  methylpred 500mg intra-op  methylpred 250 mg x1d  methylpred 125mg x1d  methylpred 60mg x1d  pred 20mg ongoing     Nutrition: Carb restricted     - increase to 30 units of glargine Q AM      If NPO: reduce to 20u    - increase lispro to 15-15-10u with meals plus scale      If NPO or glucose <90mg/dL within 1hr before meal: hold     - continue lispro corrective scale #2 ACHS, adjust to scale #2 q4hr if persistently hyperglycemic >250mg/dL   = 0u  151-200 = 2u  201-250 = 4u  251-300 = 6u  301-350 = 8u  351-400 = 10u     -Accuchecks q4hr   - Goal BG <200  -Hypoglycemia protocol  -Will continue to follow and titrate insulin accordingly    Discharge planning:   [] patient may expect to discharge home on glargine and lispro regimen above  [x]will provide CGM sample prior to discharge - leandro 3 CGM set up   [x]will enroll pt in  pharmacy platinum plan program  [x]follow up with Disha Maynard PA-C in PTDM clinic, scheduled for 5/15        I spent 50 minutes in the professional and overall care of this patient.    Disha Maynard PA-C         [1] aspirin, 81 mg, oral, Daily  atorvastatin, 80 mg, oral, Daily  calcium gluconate, 1 g, intravenous, q4h  carvedilol, 12.5 mg, oral, BID  cholecalciferol, 25 mcg, oral, Daily  clotrimazole, 10 mg, Mouth/Throat, TID after meals  furosemide, 80 mg,  oral, BID  heparin (porcine), 7,500 Units, subcutaneous, q8h ANURADHA  [START ON 4/24/2025] insulin glargine, 30 Units, subcutaneous, Daily  insulin lispro, 0-10 Units, subcutaneous, Before meals & nightly  insulin lispro, 10 Units, subcutaneous, Daily with evening meal  insulin lispro, 15 Units, subcutaneous, BID AC  mycophenolate, 1,000 mg, oral, q12h ANURADHA  pantoprazole, 40 mg, oral, BID AC  polyethylene glycol, 17 g, oral, Daily  predniSONE, 20 mg, oral, Daily  sennosides, 1 tablet, oral, BID  sodium bicarbonate, 650 mg, oral, BID  sulfamethoxazole-trimethoprim, 1 tablet, oral, Daily  tacrolimus, 3 mg, oral, q12h ANURADHA  valGANciclovir, 450 mg, oral, q48h     [2]    [3] PRN medications: acetaminophen **OR** acetaminophen **OR** acetaminophen, dextrose, dextrose, glucagon, glucagon, naloxone, ondansetron **OR** ondansetron, oxygen, traMADol, traMADol

## 2025-04-24 ENCOUNTER — APPOINTMENT (OUTPATIENT)
Facility: HOSPITAL | Age: 40
DRG: 650 | End: 2025-04-24
Payer: MEDICARE

## 2025-04-24 ENCOUNTER — TELEPHONE (OUTPATIENT)
Facility: HOSPITAL | Age: 40
End: 2025-04-24
Payer: COMMERCIAL

## 2025-04-24 LAB
ALBUMIN SERPL BCP-MCNC: 3.9 G/DL (ref 3.4–5)
ANION GAP SERPL CALC-SCNC: 19 MMOL/L (ref 10–20)
BASOPHILS # BLD AUTO: 0.01 X10*3/UL (ref 0–0.1)
BASOPHILS NFR BLD AUTO: 0.2 %
BUN SERPL-MCNC: 65 MG/DL (ref 6–23)
CA-I BLD-SCNC: 0.91 MMOL/L (ref 1.1–1.33)
CALCIUM SERPL-MCNC: 8.5 MG/DL (ref 8.6–10.6)
CHLORIDE SERPL-SCNC: 93 MMOL/L (ref 98–107)
CO2 SERPL-SCNC: 27 MMOL/L (ref 21–32)
CREAT SERPL-MCNC: 10.08 MG/DL (ref 0.5–1.3)
EGFRCR SERPLBLD CKD-EPI 2021: 6 ML/MIN/1.73M*2
EOSINOPHIL # BLD AUTO: 0.03 X10*3/UL (ref 0–0.7)
EOSINOPHIL NFR BLD AUTO: 0.6 %
ERYTHROCYTE [DISTWIDTH] IN BLOOD BY AUTOMATED COUNT: 17.3 % (ref 11.5–14.5)
GLUCOSE BLD MANUAL STRIP-MCNC: 231 MG/DL (ref 74–99)
GLUCOSE BLD MANUAL STRIP-MCNC: 232 MG/DL (ref 74–99)
GLUCOSE BLD MANUAL STRIP-MCNC: 269 MG/DL (ref 74–99)
GLUCOSE BLD MANUAL STRIP-MCNC: 270 MG/DL (ref 74–99)
GLUCOSE SERPL-MCNC: 257 MG/DL (ref 74–99)
HCT VFR BLD AUTO: 30 % (ref 41–52)
HGB BLD-MCNC: 9.2 G/DL (ref 13.5–17.5)
IMM GRANULOCYTES # BLD AUTO: 0.14 X10*3/UL (ref 0–0.7)
IMM GRANULOCYTES NFR BLD AUTO: 2.7 % (ref 0–0.9)
LYMPHOCYTES # BLD AUTO: 0.19 X10*3/UL (ref 1.2–4.8)
LYMPHOCYTES NFR BLD AUTO: 3.6 %
MAGNESIUM SERPL-MCNC: 2.33 MG/DL (ref 1.6–2.4)
MCH RBC QN AUTO: 24.2 PG (ref 26–34)
MCHC RBC AUTO-ENTMCNC: 30.7 G/DL (ref 32–36)
MCV RBC AUTO: 79 FL (ref 80–100)
MONOCYTES # BLD AUTO: 0.39 X10*3/UL (ref 0.1–1)
MONOCYTES NFR BLD AUTO: 7.5 %
NEUTROPHILS # BLD AUTO: 4.46 X10*3/UL (ref 1.2–7.7)
NEUTROPHILS NFR BLD AUTO: 85.4 %
NRBC BLD-RTO: 0.6 /100 WBCS (ref 0–0)
PHOSPHATE SERPL-MCNC: 5.2 MG/DL (ref 2.5–4.9)
PLATELET # BLD AUTO: 79 X10*3/UL (ref 150–450)
POTASSIUM SERPL-SCNC: 3.5 MMOL/L (ref 3.5–5.3)
RBC # BLD AUTO: 3.8 X10*6/UL (ref 4.5–5.9)
SODIUM SERPL-SCNC: 135 MMOL/L (ref 136–145)
TACROLIMUS BLD-MCNC: 10.2 NG/ML
WBC # BLD AUTO: 5.2 X10*3/UL (ref 4.4–11.3)

## 2025-04-24 PROCEDURE — 2500000004 HC RX 250 GENERAL PHARMACY W/ HCPCS (ALT 636 FOR OP/ED): Performed by: PHYSICIAN ASSISTANT

## 2025-04-24 PROCEDURE — 85025 COMPLETE CBC W/AUTO DIFF WBC: CPT | Performed by: PHYSICIAN ASSISTANT

## 2025-04-24 PROCEDURE — 2500000004 HC RX 250 GENERAL PHARMACY W/ HCPCS (ALT 636 FOR OP/ED): Performed by: STUDENT IN AN ORGANIZED HEALTH CARE EDUCATION/TRAINING PROGRAM

## 2025-04-24 PROCEDURE — 2500000002 HC RX 250 W HCPCS SELF ADMINISTERED DRUGS (ALT 637 FOR MEDICARE OP, ALT 636 FOR OP/ED): Performed by: PHYSICIAN ASSISTANT

## 2025-04-24 PROCEDURE — 82947 ASSAY GLUCOSE BLOOD QUANT: CPT

## 2025-04-24 PROCEDURE — 2500000004 HC RX 250 GENERAL PHARMACY W/ HCPCS (ALT 636 FOR OP/ED)

## 2025-04-24 PROCEDURE — 36415 COLL VENOUS BLD VENIPUNCTURE: CPT | Performed by: PHYSICIAN ASSISTANT

## 2025-04-24 PROCEDURE — 2500000001 HC RX 250 WO HCPCS SELF ADMINISTERED DRUGS (ALT 637 FOR MEDICARE OP)

## 2025-04-24 PROCEDURE — 99233 SBSQ HOSP IP/OBS HIGH 50: CPT | Performed by: INTERNAL MEDICINE

## 2025-04-24 PROCEDURE — 83735 ASSAY OF MAGNESIUM: CPT | Performed by: PHYSICIAN ASSISTANT

## 2025-04-24 PROCEDURE — 80069 RENAL FUNCTION PANEL: CPT | Performed by: PHYSICIAN ASSISTANT

## 2025-04-24 PROCEDURE — 1100000001 HC PRIVATE ROOM DAILY

## 2025-04-24 PROCEDURE — 2500000005 HC RX 250 GENERAL PHARMACY W/O HCPCS

## 2025-04-24 PROCEDURE — 82330 ASSAY OF CALCIUM: CPT | Performed by: PHYSICIAN ASSISTANT

## 2025-04-24 PROCEDURE — 99232 SBSQ HOSP IP/OBS MODERATE 35: CPT | Performed by: NURSE PRACTITIONER

## 2025-04-24 PROCEDURE — 2500000001 HC RX 250 WO HCPCS SELF ADMINISTERED DRUGS (ALT 637 FOR MEDICARE OP): Performed by: STUDENT IN AN ORGANIZED HEALTH CARE EDUCATION/TRAINING PROGRAM

## 2025-04-24 PROCEDURE — 80197 ASSAY OF TACROLIMUS: CPT | Performed by: PHYSICIAN ASSISTANT

## 2025-04-24 PROCEDURE — 2500000001 HC RX 250 WO HCPCS SELF ADMINISTERED DRUGS (ALT 637 FOR MEDICARE OP): Performed by: PHYSICIAN ASSISTANT

## 2025-04-24 RX ORDER — AMLODIPINE BESYLATE 10 MG/1
10 TABLET ORAL DAILY
Status: DISCONTINUED | OUTPATIENT
Start: 2025-04-24 | End: 2025-04-26 | Stop reason: HOSPADM

## 2025-04-24 RX ORDER — METHOCARBAMOL 500 MG/1
500 TABLET, FILM COATED ORAL EVERY 6 HOURS SCHEDULED
Status: DISCONTINUED | OUTPATIENT
Start: 2025-04-24 | End: 2025-04-25

## 2025-04-24 RX ORDER — CALCIUM GLUCONATE 20 MG/ML
2 INJECTION, SOLUTION INTRAVENOUS ONCE
Status: COMPLETED | OUTPATIENT
Start: 2025-04-24 | End: 2025-04-24

## 2025-04-24 RX ORDER — METHOCARBAMOL 500 MG/1
500 TABLET, FILM COATED ORAL EVERY 6 HOURS PRN
Status: DISCONTINUED | OUTPATIENT
Start: 2025-04-24 | End: 2025-04-24

## 2025-04-24 RX ORDER — LIDOCAINE 560 MG/1
1 PATCH PERCUTANEOUS; TOPICAL; TRANSDERMAL DAILY
Status: DISCONTINUED | OUTPATIENT
Start: 2025-04-24 | End: 2025-04-26 | Stop reason: HOSPADM

## 2025-04-24 RX ORDER — METHOCARBAMOL 500 MG/1
500 TABLET, FILM COATED ORAL EVERY 6 HOURS SCHEDULED
Status: DISCONTINUED | OUTPATIENT
Start: 2025-04-24 | End: 2025-04-24

## 2025-04-24 RX ORDER — ACETAMINOPHEN 325 MG/1
650 TABLET ORAL EVERY 6 HOURS
Status: DISCONTINUED | OUTPATIENT
Start: 2025-04-24 | End: 2025-04-26 | Stop reason: HOSPADM

## 2025-04-24 RX ORDER — OXYCODONE HYDROCHLORIDE 5 MG/1
5 TABLET ORAL EVERY 6 HOURS PRN
Status: DISCONTINUED | OUTPATIENT
Start: 2025-04-24 | End: 2025-04-26 | Stop reason: HOSPADM

## 2025-04-24 RX ADMIN — INSULIN LISPRO 4 UNITS: 100 INJECTION, SOLUTION INTRAVENOUS; SUBCUTANEOUS at 12:19

## 2025-04-24 RX ADMIN — INSULIN LISPRO 10 UNITS: 100 INJECTION, SOLUTION INTRAVENOUS; SUBCUTANEOUS at 16:05

## 2025-04-24 RX ADMIN — CLOTRIMAZOLE 10 MG: 10 LOZENGE ORAL at 08:06

## 2025-04-24 RX ADMIN — INSULIN LISPRO 6 UNITS: 100 INJECTION, SOLUTION INTRAVENOUS; SUBCUTANEOUS at 08:07

## 2025-04-24 RX ADMIN — PANTOPRAZOLE SODIUM 40 MG: 40 TABLET, DELAYED RELEASE ORAL at 06:38

## 2025-04-24 RX ADMIN — ACETAMINOPHEN 650 MG: 325 TABLET, FILM COATED ORAL at 08:07

## 2025-04-24 RX ADMIN — SULFAMETHOXAZOLE AND TRIMETHOPRIM 1 TABLET: 400; 80 TABLET ORAL at 08:06

## 2025-04-24 RX ADMIN — SODIUM BICARBONATE 650 MG: 650 TABLET ORAL at 08:06

## 2025-04-24 RX ADMIN — METHOCARBAMOL 500 MG: 500 TABLET ORAL at 18:06

## 2025-04-24 RX ADMIN — TRAMADOL HYDROCHLORIDE 50 MG: 50 TABLET, COATED ORAL at 02:09

## 2025-04-24 RX ADMIN — FUROSEMIDE 80 MG: 40 TABLET ORAL at 08:07

## 2025-04-24 RX ADMIN — HEPARIN SODIUM 7500 UNITS: 5000 INJECTION, SOLUTION INTRAVENOUS; SUBCUTANEOUS at 21:50

## 2025-04-24 RX ADMIN — TACROLIMUS 3 MG: 1 CAPSULE ORAL at 06:37

## 2025-04-24 RX ADMIN — HEPARIN SODIUM 7500 UNITS: 5000 INJECTION, SOLUTION INTRAVENOUS; SUBCUTANEOUS at 06:37

## 2025-04-24 RX ADMIN — OXYCODONE 5 MG: 5 TABLET ORAL at 12:40

## 2025-04-24 RX ADMIN — CALCIUM GLUCONATE 2 G: 20 INJECTION, SOLUTION INTRAVENOUS at 09:33

## 2025-04-24 RX ADMIN — MYCOPHENOLATE MOFETIL 1000 MG: 250 CAPSULE ORAL at 18:06

## 2025-04-24 RX ADMIN — INSULIN LISPRO 4 UNITS: 100 INJECTION, SOLUTION INTRAVENOUS; SUBCUTANEOUS at 20:03

## 2025-04-24 RX ADMIN — FUROSEMIDE 80 MG: 40 TABLET ORAL at 16:04

## 2025-04-24 RX ADMIN — METHOCARBAMOL 500 MG: 500 TABLET ORAL at 08:06

## 2025-04-24 RX ADMIN — ATORVASTATIN CALCIUM 80 MG: 80 TABLET, FILM COATED ORAL at 20:03

## 2025-04-24 RX ADMIN — CLOTRIMAZOLE 10 MG: 10 LOZENGE ORAL at 12:18

## 2025-04-24 RX ADMIN — CARVEDILOL 12.5 MG: 12.5 TABLET, FILM COATED ORAL at 08:07

## 2025-04-24 RX ADMIN — PANTOPRAZOLE SODIUM 40 MG: 40 TABLET, DELAYED RELEASE ORAL at 16:04

## 2025-04-24 RX ADMIN — INSULIN LISPRO 15 UNITS: 100 INJECTION, SOLUTION INTRAVENOUS; SUBCUTANEOUS at 08:07

## 2025-04-24 RX ADMIN — INSULIN GLARGINE 30 UNITS: 100 INJECTION, SOLUTION SUBCUTANEOUS at 08:08

## 2025-04-24 RX ADMIN — OXYCODONE 5 MG: 5 TABLET ORAL at 21:53

## 2025-04-24 RX ADMIN — LIDOCAINE 4% 1 PATCH: 40 PATCH TOPICAL at 02:14

## 2025-04-24 RX ADMIN — ASPIRIN 81 MG: 81 TABLET, CHEWABLE ORAL at 08:07

## 2025-04-24 RX ADMIN — INSULIN LISPRO 15 UNITS: 100 INJECTION, SOLUTION INTRAVENOUS; SUBCUTANEOUS at 12:19

## 2025-04-24 RX ADMIN — PREDNISONE 20 MG: 10 TABLET ORAL at 08:07

## 2025-04-24 RX ADMIN — METHOCARBAMOL 500 MG: 500 TABLET ORAL at 12:18

## 2025-04-24 RX ADMIN — MYCOPHENOLATE MOFETIL 1000 MG: 250 CAPSULE ORAL at 06:38

## 2025-04-24 RX ADMIN — INSULIN LISPRO 6 UNITS: 100 INJECTION, SOLUTION INTRAVENOUS; SUBCUTANEOUS at 16:05

## 2025-04-24 RX ADMIN — AMLODIPINE BESYLATE 10 MG: 10 TABLET ORAL at 09:33

## 2025-04-24 RX ADMIN — CLOTRIMAZOLE 10 MG: 10 LOZENGE ORAL at 18:06

## 2025-04-24 RX ADMIN — TACROLIMUS 3 MG: 1 CAPSULE ORAL at 18:06

## 2025-04-24 RX ADMIN — HEPARIN SODIUM 7500 UNITS: 5000 INJECTION, SOLUTION INTRAVENOUS; SUBCUTANEOUS at 13:28

## 2025-04-24 RX ADMIN — ACETAMINOPHEN 650 MG: 325 TABLET, FILM COATED ORAL at 13:29

## 2025-04-24 RX ADMIN — CARVEDILOL 12.5 MG: 12.5 TABLET, FILM COATED ORAL at 20:03

## 2025-04-24 RX ADMIN — Medication 25 MCG: at 08:07

## 2025-04-24 ASSESSMENT — PAIN - FUNCTIONAL ASSESSMENT
PAIN_FUNCTIONAL_ASSESSMENT: 0-10

## 2025-04-24 ASSESSMENT — COGNITIVE AND FUNCTIONAL STATUS - GENERAL
MOBILITY SCORE: 24
DAILY ACTIVITIY SCORE: 24
MOBILITY SCORE: 24
DAILY ACTIVITIY SCORE: 24

## 2025-04-24 ASSESSMENT — PAIN DESCRIPTION - ORIENTATION
ORIENTATION: RIGHT
ORIENTATION: RIGHT

## 2025-04-24 ASSESSMENT — ENCOUNTER SYMPTOMS
HALLUCINATIONS: 0
PHOTOPHOBIA: 0
AGITATION: 0
NECK STIFFNESS: 0
FACIAL ASYMMETRY: 0
NECK PAIN: 0
COUGH: 0
HEMATURIA: 0
POLYPHAGIA: 0
DIZZINESS: 0
POLYDIPSIA: 0
SHORTNESS OF BREATH: 0
PALPITATIONS: 0
ABDOMINAL DISTENTION: 0
FATIGUE: 1
SORE THROAT: 0
BLOOD IN STOOL: 0
DYSURIA: 0
RHINORRHEA: 0
CHILLS: 0

## 2025-04-24 ASSESSMENT — PAIN DESCRIPTION - LOCATION
LOCATION: INCISION

## 2025-04-24 ASSESSMENT — PAIN SCALES - GENERAL
PAINLEVEL_OUTOF10: 6
PAINLEVEL_OUTOF10: 6
PAINLEVEL_OUTOF10: 8
PAINLEVEL_OUTOF10: 8
PAINLEVEL_OUTOF10: 7
PAINLEVEL_OUTOF10: 0 - NO PAIN
PAINLEVEL_OUTOF10: 4

## 2025-04-24 ASSESSMENT — PAIN DESCRIPTION - DESCRIPTORS: DESCRIPTORS: DISCOMFORT

## 2025-04-24 NOTE — CARE PLAN
The patient's goals for the shift include      The clinical goals for the shift include remain hds      Problem: Pain - Adult  Goal: Verbalizes/displays adequate comfort level or baseline comfort level  Outcome: Progressing     Problem: Safety - Adult  Goal: Free from fall injury  Outcome: Progressing     Problem: Discharge Planning  Goal: Discharge to home or other facility with appropriate resources  Outcome: Progressing     Problem: Chronic Conditions and Co-morbidities  Goal: Patient's chronic conditions and co-morbidity symptoms are monitored and maintained or improved  Outcome: Progressing     Problem: Nutrition  Goal: Nutrient intake appropriate for maintaining nutritional needs  Outcome: Progressing     Problem: Diabetes  Goal: Achieve decreasing blood glucose levels by end of shift  Outcome: Progressing  Goal: Increase stability of blood glucose readings by end of shift  Outcome: Progressing  Goal: Decrease in ketones present in urine by end of shift  Outcome: Progressing  Goal: Maintain electrolyte levels within acceptable range throughout shift  Outcome: Progressing  Goal: Maintain glucose levels >70mg/dl to <250mg/dl throughout shift  Outcome: Progressing  Goal: No changes in neurological exam by end of shift  Outcome: Progressing  Goal: Learn about and adhere to nutrition recommendations by end of shift  Outcome: Progressing  Goal: Vital signs within normal range for age by end of shift  Outcome: Progressing  Goal: Increase self care and/or family involovement by end of shift  Outcome: Progressing  Goal: Receive DSME education by end of shift  Outcome: Progressing     Problem: Pain  Goal: Takes deep breaths with improved pain control throughout the shift  Outcome: Progressing  Goal: Turns in bed with improved pain control throughout the shift  Outcome: Progressing  Goal: Walks with improved pain control throughout the shift  Outcome: Progressing  Goal: Performs ADL's with improved pain control  throughout shift  Outcome: Progressing  Goal: Participates in PT with improved pain control throughout the shift  Outcome: Progressing  Goal: Free from opioid side effects throughout the shift  Outcome: Progressing  Goal: Free from acute confusion related to pain meds throughout the shift  Outcome: Progressing

## 2025-04-24 NOTE — SIGNIFICANT EVENT
04/24/25 1148   Patient Interaction   Organ Kidney   Type of Interaction Morning rounds   Interdisciplinary Rounds   Attendance Physician;NOEL;Pharmacist   Topics Discussed Diet;Home care needs;Medications;Blood test results;Activity     Transplant Surgery Multidisciplinary Team Note    Tanmay Mcneill is a 39 y.o. male  POD#5 from a Kidney from a DCD donor. His post operative complications: Need for dialysis     24 Hour Events  1. No acute events overnight     Last Recorded Vitals  Visit Vitals  /76 (BP Location: Left arm, Patient Position: Sitting)   Pulse 71   Temp 36.1 °C (97 °F) (Temporal)   Resp 17      Intake/Output last 3 Shifts:    Intake/Output Summary (Last 24 hours) at 4/24/2025 1148  Last data filed at 4/24/2025 1111  Gross per 24 hour   Intake 1770 ml   Output 2534 ml   Net -764 ml      Vitals:    04/24/25 0600   Weight: (!) 159 kg (351 lb)        Assessment/Plan  Kidney allograft function  -DGF   -HD yesterday, 2 liters UF   -No UOP recorded yesterday  -Repeat DUS kidney yesterday similar to previous, interval normalization of peak velocity in main renal artery   -Pimentel x 7 days      Cardiac  -PMH of Hypertension  -Re-start home amlodipine 10 mg, continue Coreg 12.5 mg BID   -Start ASA 81 mg   -Increase SQH 7500 units TID      Immunosuppression/PPX  -Completed Thymo 4.5 mg/kg total     Hyperkalemia--> improved to 3.5  -Lokelma stopped           Principal Problem:    Management after organ transplant  Active Problems:  Problem List[1]     Immunosuppression reviewed and adjusted              Induction: Thymoglobulin Full Dose              Tacrolimus goal 8-10 ng/mL. Current dose 3 mg BID                MMF 1000 mg po BID              Solumedrol taper  DVT prophylaxis SCDS and subcutaneous heparin 5000 TID  PT/OT  Diet:  carb controlled, renal, low phos   Anticipated discharge Saturday      Joanna Rdz PA-C             [1]   Patient Active Problem List  Diagnosis    Acid reflux     Arteriovenous graft for hemodialysis in place, primary    Asthma    CKD (chronic kidney disease), stage V (Multi)    Chronic pain of right knee    Dyslipidemia    Hypertension    Morbid obesity (Multi)    Osteoarthritis    Snoring    Type 2 diabetes mellitus    Vitamin D deficiency    Transplant    Preop cardiovascular exam    ESRD (end stage renal disease) (Multi)    Type 2 diabetes mellitus with hyperglycemia, with long-term current use of insulin    Steroid-induced hyperglycemia

## 2025-04-24 NOTE — PROGRESS NOTES
Tanmay Mcneill is a 39 y.o. male on day 6 of admission presenting with ESRD (end stage renal disease) (Multi).    Subjective   Pt seen and evaluated at the bedside.   Appetite is improving, eating small amounts.   Mallory 3 placed 4/22/25.     I have reviewed histories, allergies and medications have been reviewed and there are no changes       Objective   Review of Systems   Constitutional:  Positive for fatigue. Negative for chills.   HENT:  Negative for rhinorrhea and sore throat.    Eyes:  Negative for photophobia and visual disturbance.   Respiratory:  Negative for cough and shortness of breath.    Cardiovascular:  Negative for chest pain and palpitations.   Gastrointestinal:  Negative for abdominal distention and blood in stool.   Endocrine: Negative for polydipsia, polyphagia and polyuria.   Genitourinary:  Negative for dysuria and hematuria.   Musculoskeletal:  Negative for neck pain and neck stiffness.   Neurological:  Negative for dizziness and facial asymmetry.   Psychiatric/Behavioral:  Negative for agitation and hallucinations.      Physical Exam  Constitutional:       General: He is not in acute distress.     Appearance: He is not ill-appearing.   HENT:      Head: Normocephalic.   Eyes:      Extraocular Movements: Extraocular movements intact.   Cardiovascular:      Rate and Rhythm: Normal rate and regular rhythm.      Pulses: Normal pulses.      Heart sounds: Normal heart sounds. No murmur heard.  Pulmonary:      Effort: Pulmonary effort is normal. No respiratory distress.      Breath sounds: Normal breath sounds.   Abdominal:      General: Abdomen is flat.      Palpations: Abdomen is soft.   Musculoskeletal:         General: Normal range of motion.   Skin:     General: Skin is warm and dry.   Neurological:      General: No focal deficit present.      Mental Status: He is alert and oriented to person, place, and time.   Psychiatric:         Mood and Affect: Mood normal.         Behavior: Behavior  "normal.         Thought Content: Thought content normal.         Judgment: Judgment normal.     Last Recorded Vitals  Blood pressure 159/85, pulse 70, temperature 36.1 °C (97 °F), temperature source Temporal, resp. rate 16, height 1.854 m (6' 1\"), weight (!) 159 kg (351 lb), SpO2 98%.  Intake/Output last 3 Shifts:  I/O last 3 completed shifts:  In: 1150 (7.2 mL/kg) [I.V.:750 (4.7 mL/kg); Other:400]  Out: 2439 (15.3 mL/kg) [Drains:35; Other:2404]  Weight: 159.2 kg     Relevant Results  Results from last 7 days   Lab Units 25  0730 25  0616 25  1632 25  0747 25  0635 25  0723 25  0535 25  1626 25  0528 25  1629 25  1322   POCT GLUCOSE mg/dL 270*  --  242* 235* 270*  --  214*   < >  --    < >  --    < >  --    GLUCOSE mg/dL  --  257*  --   --   --  242*  --   --  298*  --  233*  --  201*    < > = values in this interval not displayed.     Scheduled medications  Scheduled Medications[1]  Continuous medications  Continuous Medications[2]  PRN medications  PRN Medications[3]    Assessment & Plan  ESRD (end stage renal disease) (Multi)    Type 2 diabetes mellitus with hyperglycemia, with long-term current use of insulin  History Of Present Illness  Tanmay Mcneill is a 39 y.o. male presenting with PMHx significant for ESRD 2/2 T2DM, as well as HTN, asthma, arthritis, and obesity who presents for  donor kidney transplant.      Diabetes History  Type of diabetes: 2  Year diagnosed or age: several years ago, first emr mention in 2018  Hospitalizations for DKA or HHS: none  Complications: ESRD now s/p DDKT, obesity  Seen by PCP or Endocrinology: PCP  Frequency of glucose checks: 0  Glucose review: no home record  Frequency of Hypoglycemia: none  Hypoglycemia unawareness: no  Severe hypoglycemia requiring assistance from others:  no     Home Medications  GLP-1: mounjaro 2.5mg x5 weeks so far  CGM: none  Previous meds: ozempic " (GI upset), glargine 8u/day                Hemoglobin A1C   Date/Time Value Ref Range Status   02/27/2025 10:54 AM 7.8 (H) 4.3 - 5.6 % Final       Comment:       American Diabetes Association guidelines indicate that patients with HgbA1c in the range 5.7-6.4% are at increased risk for development of diabetes, and intervention by lifestyle modification may be beneficial. HgbA1c greater or equal to 6.5% is considered diagnostic of diabetes.   04/02/2024 02:36 PM 7.2 (H) see below % Final      Steroid-induced hyperglycemia    PLAN  Steroids:  methylpred 500mg intra-op  methylpred 250 mg x1d  methylpred 125mg x1d  methylpred 60mg x1d  pred 20mg ongoing     Nutrition: Carb restricted     - Continue plan for increase to 30 units of glargine Q AM      If NPO: reduce to 20u    - continue lispro to 15-15-10u with meals plus scale      If NPO or glucose <90mg/dL within 1hr before meal: hold     - continue lispro corrective scale #2 ACHS, adjust to scale #2 q4hr if persistently hyperglycemic >250mg/dL   = 0u  151-200 = 2u  201-250 = 4u  251-300 = 6u  301-350 = 8u  351-400 = 10u     -Accuchecks q4hr   - Goal BG <200  -Hypoglycemia protocol  -Will continue to follow and titrate insulin accordingly    Discharge planning:   [] patient may expect to discharge home on glargine and lispro regimen above  [x]will provide CGM sample prior to discharge - leandro 3 CGM set up   [x]will enroll pt in  pharmacy platinum plan program  [x]follow up with Disha Maynard PA-C in PTDM clinic, scheduled for 5/15      I spent 30 minutes in the professional and overall care of this patient.    Sita Dumont, SO-CNP         [1] amLODIPine, 10 mg, oral, Daily  aspirin, 81 mg, oral, Daily  atorvastatin, 80 mg, oral, Daily  calcium gluconate, 2 g, intravenous, Once  carvedilol, 12.5 mg, oral, BID  cholecalciferol, 25 mcg, oral, Daily  clotrimazole, 10 mg, Mouth/Throat, TID after meals  furosemide, 80 mg, oral, BID  heparin (porcine),  7,500 Units, subcutaneous, q8h ANURADHA  insulin glargine, 30 Units, subcutaneous, Daily  insulin lispro, 0-10 Units, subcutaneous, Before meals & nightly  insulin lispro, 10 Units, subcutaneous, Daily with evening meal  insulin lispro, 15 Units, subcutaneous, BID AC  lidocaine, 1 patch, transdermal, Daily  methocarbamol, 500 mg, oral, q6h ANURADHA  mycophenolate, 1,000 mg, oral, q12h ANURADHA  pantoprazole, 40 mg, oral, BID AC  predniSONE, 20 mg, oral, Daily  sulfamethoxazole-trimethoprim, 1 tablet, oral, Daily  tacrolimus, 3 mg, oral, q12h ANURADHA  valGANciclovir, 450 mg, oral, q48h     [2]    [3] PRN medications: acetaminophen **OR** acetaminophen **OR** acetaminophen, dextrose, dextrose, glucagon, glucagon, naloxone, ondansetron **OR** ondansetron, oxygen, polyethylene glycol, sennosides, traMADol, traMADol

## 2025-04-24 NOTE — CARE PLAN
The patient's goals for the shift include  pain control    The clinical goals for the shift include pt will remain HDS this shift

## 2025-04-24 NOTE — LETTER
Patient Name:  Tanmay Mcneill  Type of Transplant: [x] Kidney [] Pancreas   YOB: 1985  Date of Transplant: 4/19/2025 (Kidney)    MRN:   [id number]  Date of Discharge:        Transplant Team:   Maintaining regular contact with your transplant team is important. Team members will continue to provide medical care, advice, and support for you and your family after transplant.     Transplant Surgeon:Doris Ontiveros   Transplant Nephrologist: Juan Miguel Feliz   Transplant Coordinator:       How to contact your Transplant Team after discharge:  Monday - Friday 8am - 4:30pm  call the Transplant [facility] Team at [phone]   Outside of office hours, Saturday, Sunday, and holidays for urgent needs  call the Hospital  at [phone] and ask for the [facility] Call [facility] Transplant Coordinator to be paged    Labs:  While on dialysis, labs will be drawn by the unit prior to each dialysis session  Once off dialysis, labs need to be drawn on Monday and Thursday each week between 8:00am and 9:00 am (before 9:00am when your medication is due). You may use a lab closer to home if you are not scheduled to be at main campus that day.  Take medication after your labs are drawn  Please go to lab before your appointment if on the same day  PLEASE DO NOT LEAVE THE LAB UNTIL YOU HAVE HAD YOUR LABS DRAWN     What to bring to clinic:  Medication bottles, medication list, and pill box  Health Monitoring sheets  Your binder        Future Appointments   Date Time Provider Department Center   5/2/2025  8:00 AM TXP ABDOMINAL SURGEON [id number]   5/9/2025  8:30 AM TXP ABDOMINAL SURGEON [id number]   5/14/2025 10:30 AM Rosita Cartwright RDN, LD [id number]   5/15/2025  8:30 AM Disha Maynard PA-C [id number]   5/16/2025  8:00 AM TXP ABDOMINAL SURGEON [id number]   5/23/2025  9:30 AM TXP ABDOMINAL SURGEON [id number]      Dialysis- Mondays,  Wednesdays, Fridays at 12:30p    Reminders:  Drink 2 liters every day (your  water bottle holds 1L)  Do NOT take tacrolimus prior to your lab draw in the morning  Take medications as directed by transplant team not by directions on pharmacy pill bottles (doses change frequently)  Call the  Specialty Pharmacy for medication refills  Please allow FIVE (5) business days for prescriptions to be completed and sent to the pharmacy    To-Do:   Schedule an appointment with your Primary Care in 1-2 months  Schedule an appointment with your general nephrologist within 6 months  Schedule an appointment with a dermatologist in 6 months                    When to call the Transplant Office:   [phone]  Temperature greater than 99.5°F (37.5°C)  Blood pressure greater than 160/90  Weight gain of 3 lbs. or more overnight  Increased swelling  Painful or burning urination  Blood in urine  Decreased urine output  Vomiting or diarrhea  Redness or increased drainage from incision  Any other concern

## 2025-04-24 NOTE — NURSING NOTE
Mr. Mcneill is resting in bed.  He is unsure of discharge date but feels comfortable going home.  Review of insulin regimen -- he is comfortable with calculating bolus + SS for meals.  He verbalizes comfort with insulin pen -- declines insulin pen return demo.  HE is satisfied with CGM and feels he will better manage his DM with this tech.  Will follow as needed while inpatient.  Time spent:  15 mins.

## 2025-04-25 ENCOUNTER — PHARMACY VISIT (OUTPATIENT)
Dept: PHARMACY | Facility: CLINIC | Age: 40
End: 2025-04-25
Payer: COMMERCIAL

## 2025-04-25 ENCOUNTER — APPOINTMENT (OUTPATIENT)
Dept: DIALYSIS | Facility: HOSPITAL | Age: 40
End: 2025-04-25
Payer: COMMERCIAL

## 2025-04-25 ENCOUNTER — LAB REQUISITION (OUTPATIENT)
Dept: LAB | Facility: CLINIC | Age: 40
End: 2025-04-25
Payer: COMMERCIAL

## 2025-04-25 DIAGNOSIS — N18.6 END STAGE RENAL DISEASE (MULTI): ICD-10-CM

## 2025-04-25 DIAGNOSIS — Z94.0 KIDNEY REPLACED BY TRANSPLANT (HHS-HCC): ICD-10-CM

## 2025-04-25 LAB
ALBUMIN SERPL BCP-MCNC: 3.4 G/DL (ref 3.4–5)
ANION GAP SERPL CALC-SCNC: 20 MMOL/L (ref 10–20)
BASOPHILS # BLD AUTO: 0 X10*3/UL (ref 0–0.1)
BASOPHILS NFR BLD AUTO: 0 %
BUN SERPL-MCNC: 80 MG/DL (ref 6–23)
CA-I BLD-SCNC: 1.05 MMOL/L (ref 1.1–1.33)
CALCIUM SERPL-MCNC: 7.7 MG/DL (ref 8.6–10.6)
CHLORIDE SERPL-SCNC: 95 MMOL/L (ref 98–107)
CO2 SERPL-SCNC: 25 MMOL/L (ref 21–32)
CREAT SERPL-MCNC: 12.18 MG/DL (ref 0.5–1.3)
EGFRCR SERPLBLD CKD-EPI 2021: 5 ML/MIN/1.73M*2
EOSINOPHIL # BLD AUTO: 0.05 X10*3/UL (ref 0–0.7)
EOSINOPHIL NFR BLD AUTO: 0.7 %
ERYTHROCYTE [DISTWIDTH] IN BLOOD BY AUTOMATED COUNT: 17.1 % (ref 11.5–14.5)
GLUCOSE BLD MANUAL STRIP-MCNC: 122 MG/DL (ref 74–99)
GLUCOSE BLD MANUAL STRIP-MCNC: 158 MG/DL (ref 74–99)
GLUCOSE BLD MANUAL STRIP-MCNC: 181 MG/DL (ref 74–99)
GLUCOSE BLD MANUAL STRIP-MCNC: 244 MG/DL (ref 74–99)
GLUCOSE SERPL-MCNC: 182 MG/DL (ref 74–99)
HCT VFR BLD AUTO: 24.2 % (ref 41–52)
HGB BLD-MCNC: 7.9 G/DL (ref 13.5–17.5)
HLA CLS I TYP PNL BLD/T DONR HIGH RES: NORMAL
HLA RESULTS: NORMAL
HLA-DP2 QL: NORMAL
HLA-DQB1 HIGH RES: NORMAL
HLA-DRB1 HIGH RES: NORMAL
IMM GRANULOCYTES # BLD AUTO: 0.13 X10*3/UL (ref 0–0.7)
IMM GRANULOCYTES NFR BLD AUTO: 1.9 % (ref 0–0.9)
LYMPHOCYTES # BLD AUTO: 0.2 X10*3/UL (ref 1.2–4.8)
LYMPHOCYTES NFR BLD AUTO: 3 %
MAGNESIUM SERPL-MCNC: 2.24 MG/DL (ref 1.6–2.4)
MCH RBC QN AUTO: 24.7 PG (ref 26–34)
MCHC RBC AUTO-ENTMCNC: 32.6 G/DL (ref 32–36)
MCV RBC AUTO: 76 FL (ref 80–100)
MONOCYTES # BLD AUTO: 0.61 X10*3/UL (ref 0.1–1)
MONOCYTES NFR BLD AUTO: 9.1 %
NEUTROPHILS # BLD AUTO: 5.73 X10*3/UL (ref 1.2–7.7)
NEUTROPHILS NFR BLD AUTO: 85.3 %
NRBC BLD-RTO: 0.3 /100 WBCS (ref 0–0)
PHOSPHATE SERPL-MCNC: 6.4 MG/DL (ref 2.5–4.9)
PLATELET # BLD AUTO: 76 X10*3/UL (ref 150–450)
POTASSIUM SERPL-SCNC: 3.8 MMOL/L (ref 3.5–5.3)
RBC # BLD AUTO: 3.2 X10*6/UL (ref 4.5–5.9)
SODIUM SERPL-SCNC: 136 MMOL/L (ref 136–145)
TACROLIMUS BLD-MCNC: 9.3 NG/ML
WBC # BLD AUTO: 6.7 X10*3/UL (ref 4.4–11.3)

## 2025-04-25 PROCEDURE — 8010000001 HC DIALYSIS - HEMODIALYSIS PER DAY

## 2025-04-25 PROCEDURE — 2500000004 HC RX 250 GENERAL PHARMACY W/ HCPCS (ALT 636 FOR OP/ED): Mod: JZ,TB

## 2025-04-25 PROCEDURE — 36415 COLL VENOUS BLD VENIPUNCTURE: CPT | Performed by: PHYSICIAN ASSISTANT

## 2025-04-25 PROCEDURE — 2500000004 HC RX 250 GENERAL PHARMACY W/ HCPCS (ALT 636 FOR OP/ED)

## 2025-04-25 PROCEDURE — 80197 ASSAY OF TACROLIMUS: CPT | Performed by: PHYSICIAN ASSISTANT

## 2025-04-25 PROCEDURE — 82947 ASSAY GLUCOSE BLOOD QUANT: CPT

## 2025-04-25 PROCEDURE — 2500000001 HC RX 250 WO HCPCS SELF ADMINISTERED DRUGS (ALT 637 FOR MEDICARE OP)

## 2025-04-25 PROCEDURE — 2500000002 HC RX 250 W HCPCS SELF ADMINISTERED DRUGS (ALT 637 FOR MEDICARE OP, ALT 636 FOR OP/ED)

## 2025-04-25 PROCEDURE — 99232 SBSQ HOSP IP/OBS MODERATE 35: CPT | Performed by: STUDENT IN AN ORGANIZED HEALTH CARE EDUCATION/TRAINING PROGRAM

## 2025-04-25 PROCEDURE — 99233 SBSQ HOSP IP/OBS HIGH 50: CPT | Performed by: INTERNAL MEDICINE

## 2025-04-25 PROCEDURE — 2500000002 HC RX 250 W HCPCS SELF ADMINISTERED DRUGS (ALT 637 FOR MEDICARE OP, ALT 636 FOR OP/ED): Performed by: PHYSICIAN ASSISTANT

## 2025-04-25 PROCEDURE — 99233 SBSQ HOSP IP/OBS HIGH 50: CPT | Performed by: NURSE PRACTITIONER

## 2025-04-25 PROCEDURE — 82330 ASSAY OF CALCIUM: CPT | Performed by: PHYSICIAN ASSISTANT

## 2025-04-25 PROCEDURE — 1100000001 HC PRIVATE ROOM DAILY

## 2025-04-25 PROCEDURE — 2500000004 HC RX 250 GENERAL PHARMACY W/ HCPCS (ALT 636 FOR OP/ED): Performed by: STUDENT IN AN ORGANIZED HEALTH CARE EDUCATION/TRAINING PROGRAM

## 2025-04-25 PROCEDURE — 2500000001 HC RX 250 WO HCPCS SELF ADMINISTERED DRUGS (ALT 637 FOR MEDICARE OP): Performed by: STUDENT IN AN ORGANIZED HEALTH CARE EDUCATION/TRAINING PROGRAM

## 2025-04-25 PROCEDURE — 2500000001 HC RX 250 WO HCPCS SELF ADMINISTERED DRUGS (ALT 637 FOR MEDICARE OP): Performed by: PHYSICIAN ASSISTANT

## 2025-04-25 PROCEDURE — 80069 RENAL FUNCTION PANEL: CPT | Performed by: PHYSICIAN ASSISTANT

## 2025-04-25 PROCEDURE — 85025 COMPLETE CBC W/AUTO DIFF WBC: CPT | Performed by: PHYSICIAN ASSISTANT

## 2025-04-25 PROCEDURE — 2500000004 HC RX 250 GENERAL PHARMACY W/ HCPCS (ALT 636 FOR OP/ED): Performed by: PHYSICIAN ASSISTANT

## 2025-04-25 PROCEDURE — 83735 ASSAY OF MAGNESIUM: CPT | Performed by: PHYSICIAN ASSISTANT

## 2025-04-25 PROCEDURE — RXMED WILLOW AMBULATORY MEDICATION CHARGE

## 2025-04-25 RX ORDER — LANCETS 33 GAUGE
1 EACH MISCELLANEOUS
Qty: 100 EACH | Refills: 0 | Status: SHIPPED | OUTPATIENT
Start: 2025-04-25

## 2025-04-25 RX ORDER — OXYCODONE HYDROCHLORIDE 5 MG/1
5 TABLET ORAL EVERY 6 HOURS PRN
Qty: 15 TABLET | Refills: 0 | Status: SHIPPED | OUTPATIENT
Start: 2025-04-25 | End: 2025-04-30

## 2025-04-25 RX ORDER — INSULIN LISPRO 100 [IU]/ML
18 INJECTION, SOLUTION INTRAVENOUS; SUBCUTANEOUS
Status: DISCONTINUED | OUTPATIENT
Start: 2025-04-25 | End: 2025-04-26 | Stop reason: HOSPADM

## 2025-04-25 RX ORDER — METHOCARBAMOL 500 MG/1
500 TABLET, FILM COATED ORAL EVERY 6 HOURS PRN
Status: DISCONTINUED | OUTPATIENT
Start: 2025-04-25 | End: 2025-04-26 | Stop reason: HOSPADM

## 2025-04-25 RX ORDER — PEN NEEDLE, DIABETIC 30 GX3/16"
1 NEEDLE, DISPOSABLE MISCELLANEOUS
Qty: 100 EACH | Refills: 0 | Status: SHIPPED | OUTPATIENT
Start: 2025-04-25 | End: 2025-05-25

## 2025-04-25 RX ORDER — INSULIN LISPRO 100 [IU]/ML
14 INJECTION, SOLUTION INTRAVENOUS; SUBCUTANEOUS
Status: DISCONTINUED | OUTPATIENT
Start: 2025-04-25 | End: 2025-04-26 | Stop reason: HOSPADM

## 2025-04-25 RX ORDER — TRAMADOL HYDROCHLORIDE 50 MG/1
50 TABLET ORAL EVERY 12 HOURS PRN
Status: DISCONTINUED | OUTPATIENT
Start: 2025-04-25 | End: 2025-04-26 | Stop reason: HOSPADM

## 2025-04-25 RX ORDER — INSULIN LISPRO 100 [IU]/ML
0-10 INJECTION, SOLUTION INTRAVENOUS; SUBCUTANEOUS
Qty: 15 ML | Refills: 0 | Status: SHIPPED | OUTPATIENT
Start: 2025-04-25

## 2025-04-25 RX ORDER — IBUPROFEN 200 MG
CAPSULE ORAL
Qty: 100 EACH | Refills: 0 | Status: SHIPPED | OUTPATIENT
Start: 2025-04-25

## 2025-04-25 RX ORDER — INSULIN GLARGINE-YFGN 100 [IU]/ML
30 INJECTION, SOLUTION SUBCUTANEOUS DAILY
Qty: 15 ML | Refills: 0 | Status: SHIPPED | OUTPATIENT
Start: 2025-04-26

## 2025-04-25 RX ORDER — TRAMADOL HYDROCHLORIDE 50 MG/1
50 TABLET ORAL EVERY 12 HOURS PRN
Qty: 10 TABLET | Refills: 0 | Status: SHIPPED | OUTPATIENT
Start: 2025-04-25 | End: 2025-04-25 | Stop reason: HOSPADM

## 2025-04-25 RX ORDER — ISOPROPYL ALCOHOL 70 ML/100ML
SWAB TOPICAL
Qty: 400 EACH | Refills: 0 | Status: SHIPPED | OUTPATIENT
Start: 2025-04-25

## 2025-04-25 RX ORDER — CARVEDILOL 12.5 MG/1
12.5 TABLET ORAL 2 TIMES DAILY
Qty: 60 TABLET | Refills: 0 | Status: SHIPPED | OUTPATIENT
Start: 2025-04-25 | End: 2025-04-26 | Stop reason: SDUPTHER

## 2025-04-25 RX ORDER — DEXTROSE 4 G
TABLET,CHEWABLE ORAL
Qty: 1 EACH | Refills: 0 | Status: SHIPPED | OUTPATIENT
Start: 2025-04-25

## 2025-04-25 RX ORDER — AMLODIPINE BESYLATE 10 MG/1
10 TABLET ORAL DAILY
Qty: 30 TABLET | Refills: 0 | Status: SHIPPED | OUTPATIENT
Start: 2025-04-25 | End: 2025-04-26 | Stop reason: SDUPTHER

## 2025-04-25 RX ORDER — NAPROXEN SODIUM 220 MG/1
81 TABLET, FILM COATED ORAL DAILY
Qty: 30 TABLET | Refills: 0 | Status: SHIPPED | OUTPATIENT
Start: 2025-04-26 | End: 2025-04-26 | Stop reason: SDUPTHER

## 2025-04-25 RX ADMIN — INSULIN LISPRO 14 UNITS: 100 INJECTION, SOLUTION INTRAVENOUS; SUBCUTANEOUS at 16:20

## 2025-04-25 RX ADMIN — CARVEDILOL 12.5 MG: 12.5 TABLET, FILM COATED ORAL at 08:08

## 2025-04-25 RX ADMIN — AMLODIPINE BESYLATE 10 MG: 10 TABLET ORAL at 08:08

## 2025-04-25 RX ADMIN — INSULIN LISPRO 15 UNITS: 100 INJECTION, SOLUTION INTRAVENOUS; SUBCUTANEOUS at 09:13

## 2025-04-25 RX ADMIN — ATORVASTATIN CALCIUM 80 MG: 80 TABLET, FILM COATED ORAL at 21:44

## 2025-04-25 RX ADMIN — FUROSEMIDE 80 MG: 40 TABLET ORAL at 08:08

## 2025-04-25 RX ADMIN — OXYCODONE 5 MG: 5 TABLET ORAL at 09:58

## 2025-04-25 RX ADMIN — OXYCODONE 5 MG: 5 TABLET ORAL at 16:18

## 2025-04-25 RX ADMIN — METHOCARBAMOL 500 MG: 500 TABLET ORAL at 16:18

## 2025-04-25 RX ADMIN — INSULIN LISPRO 4 UNITS: 100 INJECTION, SOLUTION INTRAVENOUS; SUBCUTANEOUS at 16:20

## 2025-04-25 RX ADMIN — PREDNISONE 20 MG: 10 TABLET ORAL at 08:08

## 2025-04-25 RX ADMIN — ACETAMINOPHEN 650 MG: 325 TABLET, FILM COATED ORAL at 12:14

## 2025-04-25 RX ADMIN — CLOTRIMAZOLE 10 MG: 10 LOZENGE ORAL at 08:08

## 2025-04-25 RX ADMIN — SULFAMETHOXAZOLE AND TRIMETHOPRIM 1 TABLET: 400; 80 TABLET ORAL at 08:08

## 2025-04-25 RX ADMIN — CLOTRIMAZOLE 10 MG: 10 LOZENGE ORAL at 18:02

## 2025-04-25 RX ADMIN — CARVEDILOL 12.5 MG: 12.5 TABLET, FILM COATED ORAL at 21:44

## 2025-04-25 RX ADMIN — FUROSEMIDE 80 MG: 40 TABLET ORAL at 16:18

## 2025-04-25 RX ADMIN — DARBEPOETIN ALFA 100 MCG: 100 INJECTION, SOLUTION INTRAVENOUS; SUBCUTANEOUS at 12:15

## 2025-04-25 RX ADMIN — Medication 25 MCG: at 08:08

## 2025-04-25 RX ADMIN — MYCOPHENOLATE MOFETIL 1000 MG: 250 CAPSULE ORAL at 06:41

## 2025-04-25 RX ADMIN — INSULIN GLARGINE 30 UNITS: 100 INJECTION, SOLUTION SUBCUTANEOUS at 08:15

## 2025-04-25 RX ADMIN — ACETAMINOPHEN 650 MG: 325 TABLET, FILM COATED ORAL at 00:15

## 2025-04-25 RX ADMIN — MYCOPHENOLATE MOFETIL 1000 MG: 250 CAPSULE ORAL at 18:02

## 2025-04-25 RX ADMIN — ASPIRIN 81 MG: 81 TABLET, CHEWABLE ORAL at 08:08

## 2025-04-25 RX ADMIN — INSULIN LISPRO 2 UNITS: 100 INJECTION, SOLUTION INTRAVENOUS; SUBCUTANEOUS at 12:16

## 2025-04-25 RX ADMIN — ACETAMINOPHEN 650 MG: 325 TABLET, FILM COATED ORAL at 21:44

## 2025-04-25 RX ADMIN — VALGANCICLOVIR HYDROCHLORIDE 450 MG: 450 TABLET ORAL at 08:08

## 2025-04-25 RX ADMIN — ACETAMINOPHEN 650 MG: 325 TABLET, FILM COATED ORAL at 08:08

## 2025-04-25 RX ADMIN — HEPARIN SODIUM 7500 UNITS: 5000 INJECTION, SOLUTION INTRAVENOUS; SUBCUTANEOUS at 14:03

## 2025-04-25 RX ADMIN — CLOTRIMAZOLE 10 MG: 10 LOZENGE ORAL at 12:14

## 2025-04-25 RX ADMIN — TACROLIMUS 3 MG: 1 CAPSULE ORAL at 18:02

## 2025-04-25 RX ADMIN — TACROLIMUS 3 MG: 1 CAPSULE ORAL at 06:41

## 2025-04-25 RX ADMIN — HEPARIN SODIUM 7500 UNITS: 5000 INJECTION, SOLUTION INTRAVENOUS; SUBCUTANEOUS at 21:44

## 2025-04-25 RX ADMIN — HEPARIN SODIUM 7500 UNITS: 5000 INJECTION, SOLUTION INTRAVENOUS; SUBCUTANEOUS at 06:40

## 2025-04-25 RX ADMIN — OXYCODONE 5 MG: 5 TABLET ORAL at 23:58

## 2025-04-25 RX ADMIN — PANTOPRAZOLE SODIUM 40 MG: 40 TABLET, DELAYED RELEASE ORAL at 06:41

## 2025-04-25 RX ADMIN — INSULIN LISPRO 2 UNITS: 100 INJECTION, SOLUTION INTRAVENOUS; SUBCUTANEOUS at 09:17

## 2025-04-25 RX ADMIN — INSULIN LISPRO 18 UNITS: 100 INJECTION, SOLUTION INTRAVENOUS; SUBCUTANEOUS at 12:16

## 2025-04-25 RX ADMIN — PANTOPRAZOLE SODIUM 40 MG: 40 TABLET, DELAYED RELEASE ORAL at 16:18

## 2025-04-25 RX ADMIN — METHOCARBAMOL 500 MG: 500 TABLET ORAL at 06:41

## 2025-04-25 ASSESSMENT — PAIN SCALES - PAIN ASSESSMENT IN ADVANCED DEMENTIA (PAINAD): BODYLANGUAGE: NORMAL

## 2025-04-25 ASSESSMENT — COGNITIVE AND FUNCTIONAL STATUS - GENERAL
MOBILITY SCORE: 24
DAILY ACTIVITIY SCORE: 24

## 2025-04-25 ASSESSMENT — PAIN - FUNCTIONAL ASSESSMENT
PAIN_FUNCTIONAL_ASSESSMENT: 0-10
PAIN_FUNCTIONAL_ASSESSMENT: NO/DENIES PAIN
PAIN_FUNCTIONAL_ASSESSMENT: 0-10

## 2025-04-25 ASSESSMENT — PAIN DESCRIPTION - DESCRIPTORS
DESCRIPTORS: DISCOMFORT;SORE
DESCRIPTORS: ACHING
DESCRIPTORS: DISCOMFORT;SORE
DESCRIPTORS: DISCOMFORT;SORE

## 2025-04-25 ASSESSMENT — ENCOUNTER SYMPTOMS
POLYDIPSIA: 0
PHOTOPHOBIA: 0
POLYPHAGIA: 0
SHORTNESS OF BREATH: 0
ABDOMINAL DISTENTION: 0
NECK PAIN: 0
BLOOD IN STOOL: 0
HALLUCINATIONS: 0
DIZZINESS: 0
PALPITATIONS: 0
COUGH: 0
FACIAL ASYMMETRY: 0
NECK STIFFNESS: 0
CHILLS: 0
SORE THROAT: 0
DYSURIA: 0
RHINORRHEA: 0
AGITATION: 0
FATIGUE: 1
HEMATURIA: 0

## 2025-04-25 ASSESSMENT — PAIN SCALES - GENERAL
PAINLEVEL_OUTOF10: 8
PAINLEVEL_OUTOF10: 8
PAINLEVEL_OUTOF10: 4
PAINLEVEL_OUTOF10: 7
PAINLEVEL_OUTOF10: 4
PAINLEVEL_OUTOF10: 6

## 2025-04-25 ASSESSMENT — PAIN DESCRIPTION - LOCATION
LOCATION: INCISION
LOCATION: ABDOMEN

## 2025-04-25 ASSESSMENT — PAIN DESCRIPTION - ORIENTATION
ORIENTATION: RIGHT

## 2025-04-25 NOTE — PROGRESS NOTES
Tanmay Mcneill is a 39 y.o. male on day 7 of admission presenting with ESRD (end stage renal disease) (Multi).      Transitional Care Coordination Progress Note:  Patient discussed during interdisciplinary rounds.      Plan per Medical/Surgical team:    Home Care choice for home going needs, [facility].  Pt Needs: PT/OT/RN for hyatt.   Requested [facility] referral.   4/23: Home. No home going needs anticipated for this pt at this time.    4/25: Home. No home going needs anticipated for this pt at this time.        Discharge disposition: Home. No home going needs anticipated for this pt at this time.       Potential Barriers: None     ADOD:  2/26     This TCC will continue to follow for home going needs and safe DC plan.      NARAYAN SANDOVAL

## 2025-04-25 NOTE — PROGRESS NOTES
Tanmay Mcneill is a 39 y.o. male on day 7 of admission presenting with ESRD (end stage renal disease) (Multi).    Subjective   Pt seen and evaluated at the bedside.   He is eating well. Discussed insulin plan with tentative discharge recs. Patient feel comfortable with the plan.      I have reviewed histories, allergies and medications have been reviewed and there are no changes     Objective   Review of Systems   Constitutional:  Positive for fatigue. Negative for chills.   HENT:  Negative for rhinorrhea and sore throat.    Eyes:  Negative for photophobia and visual disturbance.   Respiratory:  Negative for cough and shortness of breath.    Cardiovascular:  Negative for chest pain and palpitations.   Gastrointestinal:  Negative for abdominal distention and blood in stool.   Endocrine: Negative for polydipsia, polyphagia and polyuria.   Genitourinary:  Negative for dysuria and hematuria.   Musculoskeletal:  Negative for neck pain and neck stiffness.   Neurological:  Negative for dizziness and facial asymmetry.   Psychiatric/Behavioral:  Negative for agitation and hallucinations.      Physical Exam  Constitutional:       General: He is not in acute distress.     Appearance: He is not ill-appearing.   HENT:      Head: Normocephalic.   Eyes:      Extraocular Movements: Extraocular movements intact.   Cardiovascular:      Rate and Rhythm: Normal rate and regular rhythm.      Pulses: Normal pulses.      Heart sounds: Normal heart sounds. No murmur heard.  Pulmonary:      Effort: Pulmonary effort is normal. No respiratory distress.      Breath sounds: Normal breath sounds.   Abdominal:      General: Abdomen is flat.      Palpations: Abdomen is soft.   Musculoskeletal:         General: Normal range of motion.   Skin:     General: Skin is warm and dry.   Neurological:      General: No focal deficit present.      Mental Status: He is alert and oriented to person, place, and time.   Psychiatric:         Mood and Affect: Mood  "normal.         Behavior: Behavior normal.         Thought Content: Thought content normal.         Judgment: Judgment normal.     Last Recorded Vitals  Blood pressure 136/64, pulse 72, temperature 36 °C (96.8 °F), temperature source Temporal, resp. rate 17, height 1.854 m (6' 1\"), weight (!) 161 kg (355 lb 6.1 oz), SpO2 99%.  Intake/Output last 3 Shifts:  I/O last 3 completed shifts:  In: 1180 (7.3 mL/kg) [P.O.:1080; IV Piggyback:100]  Out: 220 (1.4 mL/kg) [Urine:125 (0 mL/kg/hr); Drains:95]  Weight: 161.2 kg     Relevant Results  Results from last 7 days   Lab Units 25  0728 25  0648 25  1951 25  1524 25  1114 25  0730 25  0616 25  0747 25  0635 25  0723 25  0535 25  1626 25  0528   POCT GLUCOSE mg/dL 158*  --  232* 269* 231* 270*  --    < >  --    < >  --    < >  --    GLUCOSE mg/dL  --  182*  --   --   --   --  257*  --  242*  --  298*  --  233*    < > = values in this interval not displayed.     Scheduled medications  Scheduled Medications[1]  Continuous medications  Continuous Medications[2]  PRN medications  PRN Medications[3]    Assessment & Plan  ESRD (end stage renal disease) (Multi)    Type 2 diabetes mellitus with hyperglycemia, with long-term current use of insulin  History Of Present Illness  Tanmay Mcneill is a 39 y.o. male presenting with PMHx significant for ESRD 2/2 T2DM, as well as HTN, asthma, arthritis, and obesity who presents for  donor kidney transplant.      Diabetes History  Type of diabetes: 2  Year diagnosed or age: several years ago, first emr mention in 2018  Hospitalizations for DKA or HHS: none  Complications: ESRD now s/p DDKT, obesity  Seen by PCP or Endocrinology: PCP  Frequency of glucose checks: 0  Glucose review: no home record  Frequency of Hypoglycemia: none  Hypoglycemia unawareness: no  Severe hypoglycemia requiring assistance from others:  no     Home Medications  GLP-1: mounjaro 2.5mg x5 " weeks so far  CGM: none  Previous meds: ozempic (GI upset), glargine 8u/day                Hemoglobin A1C   Date/Time Value Ref Range Status   02/27/2025 10:54 AM 7.8 (H) 4.3 - 5.6 % Final       Comment:       American Diabetes Association guidelines indicate that patients with HgbA1c in the range 5.7-6.4% are at increased risk for development of diabetes, and intervention by lifestyle modification may be beneficial. HgbA1c greater or equal to 6.5% is considered diagnostic of diabetes.   04/02/2024 02:36 PM 7.2 (H) see below % Final      Steroid-induced hyperglycemia    PLAN  Steroids:  methylpred 500mg intra-op  methylpred 250 mg x1d  methylpred 125mg x1d  methylpred 60mg x1d  pred 20mg ongoing     Nutrition: Carb restricted     - Continue 30 units of glargine Q AM      If NPO: reduce to 20u    - recommend increasing  lispro to 18-18-14u with meals plus scale      If NPO or glucose <90mg/dL within 1hr before meal: hold     - continue lispro corrective scale #2 ACHS, adjust to scale #2 q4hr if persistently hyperglycemic >250mg/dL   = 0u  151-200 = 2u  201-250 = 4u  251-300 = 6u  301-350 = 8u  351-400 = 10u     -Accuchecks q4hr   - Goal BG <200  -Hypoglycemia protocol  -Will continue to follow and titrate insulin accordingly    Discharge planning:   [] patient may expect to discharge home on glargine and lispro regimen above  [x]will provide CGM sample prior to discharge - leandro 3 CGM set up   [x]will enroll pt in  pharmacy platinum plan program  [x]follow up with Disha Maynard PA-C in PTDM clinic, scheduled for 5/15    I spent 50 minutes in the professional and overall care of this patient.    SO Oliver-CNP    Mr. Mcneill    INSULIN INSTRUCTIONS   Breakfast time Lunch time Dinner time  Bedtime   Lantus/Glargine = 30 units      Humalog/Lispro = 18 units plus scale Humalog/Lispro = 18 units plus scale  Humalog/Lispro = 14 units plus scale      CORRECTIVE SCALE  GLUCOSE READING    HUMALOG/LISPRO SCALE DOSE    70 - 149 0   150 - 200 2   201 - 250 4   251 - 300 6   301 - 350 8   351 - 400+ 10     If glucose remains over 400, call your diabetes provider, repeat 10 units every 4 hours and drink sugar free   liquids (water, Gatorade zero, chicken broth) until you glucose improves or you hear back from medical   professional. If your glucose remains over 400 and you develop symptoms such as nausea/vomiting or   confusion, go to the emergency room as soon as possible.               [1] acetaminophen, 650 mg, oral, q6h  amLODIPine, 10 mg, oral, Daily  aspirin, 81 mg, oral, Daily  atorvastatin, 80 mg, oral, Daily  carvedilol, 12.5 mg, oral, BID  cholecalciferol, 25 mcg, oral, Daily  clotrimazole, 10 mg, Mouth/Throat, TID after meals  furosemide, 80 mg, oral, BID  heparin (porcine), 7,500 Units, subcutaneous, q8h ANURADHA  insulin glargine, 30 Units, subcutaneous, Daily  insulin lispro, 0-10 Units, subcutaneous, Before meals & nightly  insulin lispro, 14 Units, subcutaneous, Daily with evening meal  insulin lispro, 18 Units, subcutaneous, BID AC  lidocaine, 1 patch, transdermal, Daily  methocarbamol, 500 mg, oral, q6h ANURADAH  mycophenolate, 1,000 mg, oral, q12h ANURADHA  pantoprazole, 40 mg, oral, BID AC  predniSONE, 20 mg, oral, Daily  sulfamethoxazole-trimethoprim, 1 tablet, oral, Daily  tacrolimus, 3 mg, oral, q12h ANURADHA  valGANciclovir, 450 mg, oral, q48h     [2]    [3] PRN medications: dextrose, dextrose, glucagon, glucagon, naloxone, ondansetron **OR** ondansetron, oxyCODONE, oxygen, polyethylene glycol, sennosides

## 2025-04-25 NOTE — PROGRESS NOTES
Pharmacist Transplant Discharge Note    Medications for Tanmay Mcneill were reviewed in conjunction with the multidisciplinary transplant team. The pharmacist is in agreement with the plan of care for medications at this time.     Approximately 5 minutes were spent with this patient providing follow-up education and reviewing his finalized list of discharge medications. An updated outpatient dosing schedule was provided to the patient. All questions were answered to the patient's satisfaction, and the patient confirmed understanding.    [facility]. Further educational reinforcement should be provided in the outpatient clinic.    Armani Maldonado  Pharmacy Student

## 2025-04-25 NOTE — NURSING NOTE
Report from Sending RN:    Report From: MAX  Recent Surgery of Procedure: Yes, DDKT  Baseline Level of Consciousness (LOC): a and o x 4  Oxygen Use: No  Type: ra  Diabetic: Yes, 244  Last BP Med Given Day of Dialysis: -160s.  AM meds given  Last Pain Med Given: Oxy at 10 AM   Lab Tests to be Obtained with Dialysis: No  Blood Transfusion to be Given During Dialysis: No  Available IV Access: Yes  Medications to be Administered During Dialysis: No  Continuous IV Infusion Running: No  Restraints on Currently or in the Last 24 Hours: No  Hand-Off Communication: Stable to come but requires bed due to body habitus  Dialysis Catheter Dressing: AVF  Last Dressing Change: AVF

## 2025-04-25 NOTE — NURSING NOTE
Mr. Mcneill is resting in bed -- to have dialysis today.  States discharge will be tomorrow and will cont with outpatient dialysis.  We reviewed the insulin pen and insulin regimen.  He is comfortable with both.  Will sign off at this time as he understands the diabetes management plan.  Time spent:  15 mins.

## 2025-04-25 NOTE — SIGNIFICANT EVENT
04/24/25 1148   Patient Interaction   Organ Kidney   Type of Interaction Morning rounds   Interdisciplinary Rounds   Attendance Physician;NOEL;Pharmacist   Topics Discussed Diet;Home care needs;Medications;Blood test results;Activity     Transplant Surgery Multidisciplinary Team Note    Tanmay Mcneill is a 39 y.o. male  POD#5 from a Kidney from a DCD donor. His post operative complications: Need for dialysis     24 Hour Events  1. No acute events overnight     Last Recorded Vitals  Visit Vitals  /79 (BP Location: Left arm, Patient Position: Lying)   Pulse 65   Temp 36.8 °C (98.2 °F) (Temporal)   Resp 17      Intake/Output last 3 Shifts:    Intake/Output Summary (Last 24 hours) at 4/25/2025 1154  Last data filed at 4/25/2025 1108  Gross per 24 hour   Intake 600 ml   Output 250 ml   Net 350 ml      Vitals:    04/25/25 0600   Weight: (!) 161 kg (355 lb 6.1 oz)        Assessment/Plan  Kidney allograft function  -DGF   -Pimentel x 7 days -- remove at midnight    -discontinue last BRETT drain today     Immunosuppression/PPX  -Completed Thymo 4.5 mg/kg total-- complete      Hyperkalemia--> improved   -Lokelma stopped    Endocrine  - appreciate endo recs.   -Received diabetic education      Principal Problem:    Management after organ transplant  Active Problems:  Problem List[1]     Immunosuppression reviewed and adjusted              Induction: Thymoglobulin Full Dose              Tacrolimus goal 8-10 ng/mL. Current dose 3 mg BID                MMF 1000 mg po BID              Solumedrol taper  DVT prophylaxis SCDS and subcutaneous heparin 5000 TID  PT/OT  Diet:  carb controlled, renal, low phos   Anticipated discharge Saturday      Elizabeth Giang, APRN-CNP             [1]   Patient Active Problem List  Diagnosis    Acid reflux    Arteriovenous graft for hemodialysis in place, primary    Asthma    CKD (chronic kidney disease), stage V (Multi)    Chronic pain of right knee    Dyslipidemia    Hypertension    Morbid  obesity (Multi)    Osteoarthritis    Snoring    Type 2 diabetes mellitus    Vitamin D deficiency    Transplant    Preop cardiovascular exam    ESRD (end stage renal disease) (Multi)    Type 2 diabetes mellitus with hyperglycemia, with long-term current use of insulin    Steroid-induced hyperglycemia

## 2025-04-25 NOTE — PROGRESS NOTES
"Tanmay Mcneill is a 39 y.o. male now POD # 6 s/p DDKT transplant.    Subjective   Doing well   + OOB       Objective   Physical Exam  Gen: A+OX3; NAD  Abd: S/NT/ND. Incision C/D/I.  Drains: Serosanguinous  x2  Ext: trace LE edema    Last Recorded Vitals  Blood pressure (!) 181/78, pulse 67, temperature 36 °C (96.8 °F), temperature source Temporal, resp. rate 18, height 1.854 m (6' 1\"), weight (!) 161 kg (355 lb 6.1 oz), SpO2 100%.  Intake/Output last 3 Shifts:  I/O last 3 completed shifts:  In: 1180 (7.3 mL/kg) [P.O.:1080; IV Piggyback:100]  Out: 220 (1.4 mL/kg) [Urine:125 (0 mL/kg/hr); Drains:95]  Weight: 161.2 kg      Lab Results   Component Value Date    CREATININE 12.18 (H) 04/25/2025    K 3.8 04/25/2025    GLUCOSE 182 (H) 04/25/2025    HGB 7.9 (L) 04/25/2025    WBC 6.7 04/25/2025    PLT 76 (L) 04/25/2025    CALCIUM 7.7 (L) 04/25/2025       Current Medications[1]     Assessment/Plan    ESRD 2/2 DM and HTN    S/p DDKT 4/19/25   DCD donor, KDPI 48%  Full cava used as venous extension 2/2 depth  DGF, HD  M,W,F  Cont DVT PPX   Lasix 80 PO BID  EPO today for anemia   Hyatt 7 days - difficult placement/false passage. Urology scope to place  Initial US and repeat good    Immunosuppression   PRA 19%  Goal thymo 4.5mg/kg - completed  Tacrolimus - cont 2/2  MMF 1 g BID  Steroid taper    3. Diabetes  Appreciate endocrine    4. Ppx  CMV: D-/R+ - valcyte  EBV: D+/R+  Bactrim    5. HTN  Amlodipine 10 mg  Coreg 12.5 mg    Dipso - Saturday after hyatt removed    Donor Kidney Info:  Etiology:  DCD  Risk: no but was lung tx recipient  Terminal Cr  1.08   KDPI: 48 %  PRA: 19 %  CMV: D-/R+  EBV: D+/R+  Toxo: Donor Negative  HCV Ab/MELISSA: Negative  HBcAB: Negative  HBsAg/HBV MELISSA: Negative     I spent 35 minutes in the professional and overall care of this patient.      Doris Ontiveros MD         [1]   Current Facility-Administered Medications:     acetaminophen (Tylenol) tablet 650 mg, 650 mg, oral, q6h, Joanna Rdz PA-C, 650 mg " at 04/25/25 1214    amLODIPine (Norvasc) tablet 10 mg, 10 mg, oral, Daily, Joanna Allenstneliab, PA-C, 10 mg at 04/25/25 0808    aspirin chewable tablet 81 mg, 81 mg, oral, Daily, Joanna C Springstubb, PA-C, 81 mg at 04/25/25 0808    atorvastatin (Lipitor) tablet 80 mg, 80 mg, oral, Daily, YONIS Becerra, 80 mg at 04/24/25 2003    carvedilol (Coreg) tablet 12.5 mg, 12.5 mg, oral, BID, Joanna CASTELAN Springstubb, PA-C, 12.5 mg at 04/25/25 0808    cholecalciferol (Vitamin D-3) tablet 25 mcg, 25 mcg, oral, Daily, Joanna Allenstubb, PA-C, 25 mcg at 04/25/25 0808    clotrimazole (Mycelex) villa 10 mg, 10 mg, Mouth/Throat, TID after meals, Doris Ontiveros MD, 10 mg at 04/25/25 1802    dextrose 50 % injection 12.5 g, 12.5 g, intravenous, q15 min PRN, YONIS Oliver    dextrose 50 % injection 25 g, 25 g, intravenous, q15 min PRN, YONIS Oliver    furosemide (Lasix) tablet 80 mg, 80 mg, oral, BID, Joanna Rdz, PA-C, 80 mg at 04/25/25 1618    glucagon (Glucagen) injection 1 mg, 1 mg, intramuscular, q15 min PRN, YONIS Oliver    glucagon (Glucagen) injection 1 mg, 1 mg, intramuscular, q15 min PRN, Disha Maynard PA-C    heparin (porcine) injection 7,500 Units, 7,500 Units, subcutaneous, q8h ANURADHA, LATIA Mcintosh-ANNELISE, 7,500 Units at 04/25/25 1403    insulin glargine (Lantus) injection 30 Units, 30 Units, subcutaneous, Daily, Disha Maynard PA-C, 30 Units at 04/25/25 0815    insulin lispro injection 0-10 Units, 0-10 Units, subcutaneous, Before meals & nightly, Disha Maynard PA-C, 4 Units at 04/25/25 1620    insulin lispro injection 14 Units, 14 Units, subcutaneous, Daily with evening meal, YONIS Kramer, 14 Units at 04/25/25 1620    insulin lispro injection 18 Units, 18 Units, subcutaneous, BID AC, YONIS Kramer, 18 Units at 04/25/25 1216    lidocaine 4 % patch 1 patch, 1 patch, transdermal, Daily, Eleno Cabrera MD, 1 patch at  04/24/25 0214    methocarbamol (Robaxin) tablet 500 mg, 500 mg, oral, q6h PRN, Elizabeth Giang, SO-CNP, 500 mg at 04/25/25 1618    mycophenolate (Cellcept) capsule 1,000 mg, 1,000 mg, oral, q12h ANURADHA, Neha Edouard MD, 1,000 mg at 04/25/25 1802    naloxone (Narcan) injection 0.2 mg, 0.2 mg, intravenous, q5 min PRN, Doris Ontiveros MD    ondansetron (Zofran) tablet 4 mg, 4 mg, oral, q8h PRN **OR** ondansetron (Zofran) injection 4 mg, 4 mg, intravenous, q8h PRN, Doris Ontiveros MD    oxyCODONE (Roxicodone) immediate release tablet 5 mg, 5 mg, oral, q6h PRN, Joanna Rdz, PA-C, 5 mg at 04/25/25 1618    oxygen (O2) therapy, , inhalation, Continuous PRN - O2/gases, Doris Ontiveros MD    pantoprazole (ProtoNix) EC tablet 40 mg, 40 mg, oral, BID AC, Doris Ontiveros MD, 40 mg at 04/25/25 1618    polyethylene glycol (Glycolax, Miralax) packet 17 g, 17 g, oral, Daily PRN, Joanna Allenstneliab, PA-C    predniSONE (Deltasone) tablet 20 mg, 20 mg, oral, Daily, Doris Ontiveros MD, 20 mg at 04/25/25 0808    sennosides (Senokot) tablet 8.6 mg, 1 tablet, oral, Daily PRN, Joanna C Tiffanystubb, PA-C    sulfamethoxazole-trimethoprim (Bactrim) 400-80 mg per tablet 1 tablet, 1 tablet, oral, Daily, Joanna Webbb, PA-C, 1 tablet at 04/25/25 0808    tacrolimus (Prograf) capsule 3 mg, 3 mg, oral, q12h ANURADHA, Sana Thomas, SO-CNP, 3 mg at 04/25/25 1802    traMADol (Ultram) tablet 50 mg, 50 mg, oral, q12h PRN, Elizabeth Giang, APRN-CNP    valGANciclovir (Valcyte) tablet 450 mg, 450 mg, oral, q48h, Emily Lora, APRN-CNP, 450 mg at 04/25/25 0808

## 2025-04-25 NOTE — CARE PLAN
The patient's goals for the shift include      The clinical goals for the shift include pain control      Problem: Pain - Adult  Goal: Verbalizes/displays adequate comfort level or baseline comfort level  Outcome: Progressing     Problem: Safety - Adult  Goal: Free from fall injury  Outcome: Progressing     Problem: Discharge Planning  Goal: Discharge to home or other facility with appropriate resources  Outcome: Progressing     Problem: Chronic Conditions and Co-morbidities  Goal: Patient's chronic conditions and co-morbidity symptoms are monitored and maintained or improved  Outcome: Progressing     Problem: Nutrition  Goal: Nutrient intake appropriate for maintaining nutritional needs  Outcome: Progressing     Problem: Diabetes  Goal: Achieve decreasing blood glucose levels by end of shift  Outcome: Progressing  Goal: Increase stability of blood glucose readings by end of shift  Outcome: Progressing  Goal: Decrease in ketones present in urine by end of shift  Outcome: Progressing  Goal: Maintain electrolyte levels within acceptable range throughout shift  Outcome: Progressing  Goal: Maintain glucose levels >70mg/dl to <250mg/dl throughout shift  Outcome: Progressing  Goal: No changes in neurological exam by end of shift  Outcome: Progressing  Goal: Learn about and adhere to nutrition recommendations by end of shift  Outcome: Progressing  Goal: Vital signs within normal range for age by end of shift  Outcome: Progressing  Goal: Increase self care and/or family involovement by end of shift  Outcome: Progressing  Goal: Receive DSME education by end of shift  Outcome: Progressing     Problem: Pain  Goal: Takes deep breaths with improved pain control throughout the shift  Outcome: Progressing  Goal: Turns in bed with improved pain control throughout the shift  Outcome: Progressing  Goal: Walks with improved pain control throughout the shift  Outcome: Progressing  Goal: Performs ADL's with improved pain control  throughout shift  Outcome: Progressing  Goal: Participates in PT with improved pain control throughout the shift  Outcome: Progressing  Goal: Free from opioid side effects throughout the shift  Outcome: Progressing  Goal: Free from acute confusion related to pain meds throughout the shift  Outcome: Progressing

## 2025-04-25 NOTE — CARE PLAN
The patient's goals for the shift include      The clinical goals for the shift include remain hds    Problem: Pain - Adult  Goal: Verbalizes/displays adequate comfort level or baseline comfort level  Outcome: Progressing

## 2025-04-26 VITALS
HEART RATE: 71 BPM | OXYGEN SATURATION: 96 % | RESPIRATION RATE: 18 BRPM | DIASTOLIC BLOOD PRESSURE: 76 MMHG | BODY MASS INDEX: 41.75 KG/M2 | TEMPERATURE: 97.7 F | SYSTOLIC BLOOD PRESSURE: 156 MMHG | HEIGHT: 73 IN | WEIGHT: 315 LBS

## 2025-04-26 LAB
ALBUMIN SERPL BCP-MCNC: 3.4 G/DL (ref 3.4–5)
ANION GAP SERPL CALC-SCNC: 16 MMOL/L (ref 10–20)
BASOPHILS # BLD AUTO: 0.01 X10*3/UL (ref 0–0.1)
BASOPHILS NFR BLD AUTO: 0.1 %
BUN SERPL-MCNC: 49 MG/DL (ref 6–23)
CA-I BLD-SCNC: 0.98 MMOL/L (ref 1.1–1.33)
CALCIUM SERPL-MCNC: 7.7 MG/DL (ref 8.6–10.6)
CHLORIDE SERPL-SCNC: 98 MMOL/L (ref 98–107)
CO2 SERPL-SCNC: 27 MMOL/L (ref 21–32)
CREAT SERPL-MCNC: 8.92 MG/DL (ref 0.5–1.3)
EGFRCR SERPLBLD CKD-EPI 2021: 7 ML/MIN/1.73M*2
EOSINOPHIL # BLD AUTO: 0.06 X10*3/UL (ref 0–0.7)
EOSINOPHIL NFR BLD AUTO: 0.8 %
ERYTHROCYTE [DISTWIDTH] IN BLOOD BY AUTOMATED COUNT: 17.9 % (ref 11.5–14.5)
GLUCOSE BLD MANUAL STRIP-MCNC: 149 MG/DL (ref 74–99)
GLUCOSE BLD MANUAL STRIP-MCNC: 150 MG/DL (ref 74–99)
GLUCOSE BLD MANUAL STRIP-MCNC: 260 MG/DL (ref 74–99)
GLUCOSE SERPL-MCNC: 213 MG/DL (ref 74–99)
HCT VFR BLD AUTO: 24.7 % (ref 41–52)
HGB BLD-MCNC: 7.5 G/DL (ref 13.5–17.5)
IMM GRANULOCYTES # BLD AUTO: 0.15 X10*3/UL (ref 0–0.7)
IMM GRANULOCYTES NFR BLD AUTO: 1.9 % (ref 0–0.9)
LYMPHOCYTES # BLD AUTO: 0.21 X10*3/UL (ref 1.2–4.8)
LYMPHOCYTES NFR BLD AUTO: 2.6 %
MAGNESIUM SERPL-MCNC: 2.12 MG/DL (ref 1.6–2.4)
MCH RBC QN AUTO: 24.4 PG (ref 26–34)
MCHC RBC AUTO-ENTMCNC: 30.4 G/DL (ref 32–36)
MCV RBC AUTO: 80 FL (ref 80–100)
MONOCYTES # BLD AUTO: 0.68 X10*3/UL (ref 0.1–1)
MONOCYTES NFR BLD AUTO: 8.5 %
NEUTROPHILS # BLD AUTO: 6.87 X10*3/UL (ref 1.2–7.7)
NEUTROPHILS NFR BLD AUTO: 86.1 %
NRBC BLD-RTO: 0 /100 WBCS (ref 0–0)
PHOSPHATE SERPL-MCNC: 4.8 MG/DL (ref 2.5–4.9)
PLATELET # BLD AUTO: 89 X10*3/UL (ref 150–450)
POTASSIUM SERPL-SCNC: 3.3 MMOL/L (ref 3.5–5.3)
RBC # BLD AUTO: 3.08 X10*6/UL (ref 4.5–5.9)
SODIUM SERPL-SCNC: 138 MMOL/L (ref 136–145)
TACROLIMUS BLD-MCNC: 7.5 NG/ML
WBC # BLD AUTO: 8 X10*3/UL (ref 4.4–11.3)

## 2025-04-26 PROCEDURE — 36415 COLL VENOUS BLD VENIPUNCTURE: CPT | Performed by: PHYSICIAN ASSISTANT

## 2025-04-26 PROCEDURE — 2500000001 HC RX 250 WO HCPCS SELF ADMINISTERED DRUGS (ALT 637 FOR MEDICARE OP): Performed by: PHYSICIAN ASSISTANT

## 2025-04-26 PROCEDURE — 2500000004 HC RX 250 GENERAL PHARMACY W/ HCPCS (ALT 636 FOR OP/ED)

## 2025-04-26 PROCEDURE — 2500000002 HC RX 250 W HCPCS SELF ADMINISTERED DRUGS (ALT 637 FOR MEDICARE OP, ALT 636 FOR OP/ED): Performed by: PHYSICIAN ASSISTANT

## 2025-04-26 PROCEDURE — 2500000002 HC RX 250 W HCPCS SELF ADMINISTERED DRUGS (ALT 637 FOR MEDICARE OP, ALT 636 FOR OP/ED)

## 2025-04-26 PROCEDURE — 85025 COMPLETE CBC W/AUTO DIFF WBC: CPT | Performed by: PHYSICIAN ASSISTANT

## 2025-04-26 PROCEDURE — 2500000004 HC RX 250 GENERAL PHARMACY W/ HCPCS (ALT 636 FOR OP/ED): Performed by: PHYSICIAN ASSISTANT

## 2025-04-26 PROCEDURE — 2500000004 HC RX 250 GENERAL PHARMACY W/ HCPCS (ALT 636 FOR OP/ED): Performed by: STUDENT IN AN ORGANIZED HEALTH CARE EDUCATION/TRAINING PROGRAM

## 2025-04-26 PROCEDURE — 2500000001 HC RX 250 WO HCPCS SELF ADMINISTERED DRUGS (ALT 637 FOR MEDICARE OP): Performed by: STUDENT IN AN ORGANIZED HEALTH CARE EDUCATION/TRAINING PROGRAM

## 2025-04-26 PROCEDURE — 80069 RENAL FUNCTION PANEL: CPT | Performed by: PHYSICIAN ASSISTANT

## 2025-04-26 PROCEDURE — 2500000001 HC RX 250 WO HCPCS SELF ADMINISTERED DRUGS (ALT 637 FOR MEDICARE OP)

## 2025-04-26 PROCEDURE — 82947 ASSAY GLUCOSE BLOOD QUANT: CPT

## 2025-04-26 PROCEDURE — 80197 ASSAY OF TACROLIMUS: CPT | Performed by: PHYSICIAN ASSISTANT

## 2025-04-26 PROCEDURE — 83735 ASSAY OF MAGNESIUM: CPT | Performed by: PHYSICIAN ASSISTANT

## 2025-04-26 PROCEDURE — 82330 ASSAY OF CALCIUM: CPT | Performed by: PHYSICIAN ASSISTANT

## 2025-04-26 PROCEDURE — 2500000005 HC RX 250 GENERAL PHARMACY W/O HCPCS

## 2025-04-26 RX ORDER — TACROLIMUS 1 MG/1
3 CAPSULE ORAL EVERY EVENING
Start: 2025-04-26 | End: 2025-04-29 | Stop reason: ALTCHOICE

## 2025-04-26 RX ORDER — POTASSIUM CHLORIDE 1.5 G/1.58G
20 POWDER, FOR SOLUTION ORAL ONCE
Status: COMPLETED | OUTPATIENT
Start: 2025-04-26 | End: 2025-04-26

## 2025-04-26 RX ORDER — TACROLIMUS 1 MG/1
4 CAPSULE ORAL DAILY
Start: 2025-04-26 | End: 2025-04-29 | Stop reason: SDUPTHER

## 2025-04-26 RX ADMIN — ACETAMINOPHEN 650 MG: 325 TABLET, FILM COATED ORAL at 06:19

## 2025-04-26 RX ADMIN — AMLODIPINE BESYLATE 10 MG: 10 TABLET ORAL at 09:33

## 2025-04-26 RX ADMIN — Medication 25 MCG: at 09:32

## 2025-04-26 RX ADMIN — CARVEDILOL 12.5 MG: 12.5 TABLET, FILM COATED ORAL at 09:30

## 2025-04-26 RX ADMIN — ASPIRIN 81 MG: 81 TABLET, CHEWABLE ORAL at 09:32

## 2025-04-26 RX ADMIN — FUROSEMIDE 80 MG: 40 TABLET ORAL at 09:32

## 2025-04-26 RX ADMIN — PREDNISONE 20 MG: 10 TABLET ORAL at 09:32

## 2025-04-26 RX ADMIN — SULFAMETHOXAZOLE AND TRIMETHOPRIM 1 TABLET: 400; 80 TABLET ORAL at 09:32

## 2025-04-26 RX ADMIN — PANTOPRAZOLE SODIUM 40 MG: 40 TABLET, DELAYED RELEASE ORAL at 06:19

## 2025-04-26 RX ADMIN — INSULIN GLARGINE 30 UNITS: 100 INJECTION, SOLUTION SUBCUTANEOUS at 09:30

## 2025-04-26 RX ADMIN — MYCOPHENOLATE MOFETIL 1000 MG: 250 CAPSULE ORAL at 06:19

## 2025-04-26 RX ADMIN — POTASSIUM CHLORIDE 20 MEQ: 1.5 POWDER, FOR SOLUTION ORAL at 09:33

## 2025-04-26 RX ADMIN — HEPARIN SODIUM 7500 UNITS: 5000 INJECTION, SOLUTION INTRAVENOUS; SUBCUTANEOUS at 06:17

## 2025-04-26 RX ADMIN — LIDOCAINE 4% 1 PATCH: 40 PATCH TOPICAL at 09:38

## 2025-04-26 RX ADMIN — OXYCODONE 5 MG: 5 TABLET ORAL at 10:46

## 2025-04-26 RX ADMIN — INSULIN LISPRO 18 UNITS: 100 INJECTION, SOLUTION INTRAVENOUS; SUBCUTANEOUS at 09:30

## 2025-04-26 RX ADMIN — TACROLIMUS 3 MG: 1 CAPSULE ORAL at 06:19

## 2025-04-26 RX ADMIN — CLOTRIMAZOLE 10 MG: 10 LOZENGE ORAL at 09:33

## 2025-04-26 RX ADMIN — METHOCARBAMOL 500 MG: 500 TABLET ORAL at 10:46

## 2025-04-26 ASSESSMENT — COGNITIVE AND FUNCTIONAL STATUS - GENERAL
MOBILITY SCORE: 24
DAILY ACTIVITIY SCORE: 24

## 2025-04-26 ASSESSMENT — PAIN - FUNCTIONAL ASSESSMENT
PAIN_FUNCTIONAL_ASSESSMENT: 0-10

## 2025-04-26 ASSESSMENT — PAIN SCALES - GENERAL
PAINLEVEL_OUTOF10: 0 - NO PAIN
PAINLEVEL_OUTOF10: 4
PAINLEVEL_OUTOF10: 7

## 2025-04-26 NOTE — DISCHARGE SUMMARY
Discharge Diagnosis  ESRD (end stage renal disease) (Multi)    Issues Requiring Follow-Up  DDKT 25  DGF- MWF HD  Pimentel in place for 7 days-- passed TOV/PVR    Immunosuppression   Tacrolimus, MMF, Prednisone    Home   No PT/OT needs after discharge    Test Results Pending At Discharge  Pending Labs       No current pending labs.            Hospital Course  Pt is a 40 y/o male with a hx of ESRD secondary to T2DM and HTN whom received a  donor kidney transplant on 2025 by Dr. Ontiveros with a KDPI of 48% and PRA of 19%. Donor was Hepc -/- and has/has not met risk factors. EBV +/+. Right donor kidney transplanted to patient right side. Admit weight is 153 kg (discharge weight is 160 kg ).  Pt received a total of 4.5 mg/kg total of thymoglobulin induction therapy in conjunction with 500mg IV solumedrol.  Pt was initiated on Tacrolimus & Cellcept immunosuppression in conjunction with IV solumedrol taper.  Pt was started on valcyte (CMV D - / R + ) and bactrim for CMV & PCP prophylaxis per protocol. He was started on clotrimazole as antifungal coverage per protocol. Operative course was uncomplicated.  Hospital course was uncomplicated. Pimentel catheter was removed on POD #7 and the patient is voiding spontaneously.  Pt required dialysis and electrolytes remain stable. Dialysis access via AVF and was patent on last use. BP & glucose under good control.     Pt is tolerating a carb controlled, renal diet, maintaining adequate hydration with oral intake, ambulating independently and having BMs. Immunosuppression was managed by the transplant team. Patient is in stable condition for discharge home with renal telehealth today and homecare for drain. RX delivered to bedside prior to discharge. The patient will f/u in the transplant institute and have labs drawn in the walk-in clinic.      Pertinent Physical Exam At Time of Discharge  Physical Exam  Vitals reviewed.   HENT:      Head: Normocephalic.   Cardiovascular:       Rate and Rhythm: Normal rate.      Pulses: Normal pulses.   Pulmonary:      Effort: Pulmonary effort is normal.   Abdominal:      Palpations: Abdomen is soft.   Musculoskeletal:         General: Normal range of motion.   Skin:     General: Skin is warm and dry.      Comments: RLQ incision c/d/I with staples   RLQ BRETT drain incision (drain out) -- draining minimal serosanguinous fluid. No redness.    Neurological:      Mental Status: He is alert and oriented to person, place, and time.   Psychiatric:         Mood and Affect: Mood normal.         Home Medications     Medication List      PAUSE taking these medications     Mounjaro 2.5 mg/0.5 mL pen injector; Wait to take this until your doctor   or other care provider tells you to start again.; Generic drug:   tirzepatide     START taking these medications     acetaminophen 325 mg tablet; Commonly known as: Tylenol; Take 2 tablets   (650 mg) by mouth every 6 hours if needed for mild pain (1 - 3). Do not   exceed 3000 mg in 24 hours.   acyclovir 200 mg capsule; Commonly known as: Zovirax; Take 2 capsules   (400 mg) by mouth 2 times a day.   aspirin 81 mg chewable tablet; Chew 1 tablet (81 mg) once daily.   cholecalciferol 25 mcg (1,000 units) tablet; Commonly known as: Vitamin   D-3; Take 1 tablet (25 mcg) by mouth once daily.   clotrimazole 10 mg villa; Commonly known as: Mycelex; Use 1 tablet (10   mg) in the mouth or throat 3 times a day after meals.   docusate sodium 100 mg capsule; Commonly known as: Colace; Take 1   capsule (100 mg) by mouth 2 times a day as needed for constipation.   Easy Touch Alcohol Prep Pads; Generic drug: alcohol swabs; Use 4-8 per   day to check blood glucose and for injectable medications   furosemide 40 mg tablet; Commonly known as: Lasix; Take 2 tablets (80   mg) by mouth 2 times daily (morning and late afternoon).   insulin glargine-yfgn 100 unit/mL (3 mL) pen; Commonly known as:   Semglee(insulin glarg-yfgn)Pen; Inject 30 Units  under the skin once daily.   Take as directed per insulin instructions.   insulin lispro 100 unit/mL pen; Commonly known as: HumaLOG; Inject 0-10   Units under the skin 4 times a day before meals. Take 18-18-14 units with   meals plus sliding scale.   mycophenolate 250 mg capsule; Commonly known as: Cellcept; Take 4   capsules (1,000 mg) by mouth every 12 hours.   OneTouch Verio Flex meter misc; Generic drug: blood-glucose meter; Use   to check blood glucose   oxyCODONE 5 mg immediate release tablet; Commonly known as: Roxicodone;   Take 1 tablet (5 mg) by mouth every 6 hours if needed for severe pain (7 -   10) or moderate pain (4 - 6) for up to 5 days.   polyethylene glycol 17 gram packet; Commonly known as: Glycolax,   Miralax; Take 17 g by mouth once daily as needed (Constipation).   predniSONE 5 mg tablet; Commonly known as: Deltasone; Take 4 tablets (20   mg) by mouth once daily. Do not fill before April 23, 2025.   Prevymis 480 mg tablet; Generic drug: letermovir; Take 1 tablet (480 mg)   by mouth once daily.   sulfamethoxazole-trimethoprim 400-80 mg tablet; Commonly known as:   Bactrim; Take 1 tablet by mouth once daily.   * tacrolimus 1 mg capsule; Commonly known as: Prograf; Take 3 capsules   (3 mg) by mouth once daily in the evening. Meds to beds, ADOD 4/23   * tacrolimus 1 mg capsule; Commonly known as: Prograf; Take 4 capsules   (4 mg) by mouth once daily.  * This list has 2 medication(s) that are the same as other medications   prescribed for you. Read the directions carefully, and ask your doctor or   other care provider to review them with you.     CHANGE how you take these medications     * Accu-Chek Celi Plus test strp; Generic drug: blood sugar diagnostic;   What changed: Another medication with the same name was added. Make sure   you understand how and when to take each.   * OneTouch Verio test strips; Generic drug: blood sugar diagnostic;   Check blood glucose 4 time per day; What changed: You  "were already taking   a medication with the same name, and this prescription was added. Make   sure you understand how and when to take each.   carvedilol 12.5 mg tablet; Commonly known as: Coreg; Take 1 tablet (12.5   mg) by mouth 2 times a day.; What changed: medication strength, how much   to take, when to take this   * OneTouch UltraSoft Lancets misc; Generic drug: lancets; What changed:   Another medication with the same name was added. Make sure you understand   how and when to take each.   * OneTouch Delica Plus Lancet 33 gauge misc; Generic drug: lancets; 1   each in the morning, at noon, in the evening, and at bedtime.; What   changed: You were already taking a medication with the same name, and this   prescription was added. Make sure you understand how and when to take   each.   pantoprazole 40 mg EC tablet; Commonly known as: ProtoNix; Take 1 tablet   (40 mg) by mouth once daily in the morning. Take before meals. Do not   crush, chew, or split.; What changed: medication strength, how much to   take, when to take this, additional instructions   * pen needle, diabetic 31 gauge x 1/4\" needle; What changed: Another   medication with the same name was added. Make sure you understand how and   when to take each.   * BD Ultra-Fine Mini Pen Needle 31 gauge x 3/16\" needle; Generic drug:   pen needle, diabetic; 1 each in the morning, at noon, in the evening, and   at bedtime.; What changed: You were already taking a medication with the   same name, and this prescription was added. Make sure you understand how   and when to take each.   sodium bicarbonate 650 mg tablet; Take 1 tablet (650 mg) by mouth 2   times a day.; What changed: how much to take  * This list has 6 medication(s) that are the same as other medications   prescribed for you. Read the directions carefully, and ask your doctor or   other care provider to review them with you.     CONTINUE taking these medications     amLODIPine 10 mg tablet; Commonly " known as: Norvasc; Take 1 tablet (10   mg) by mouth once daily.   atorvastatin 80 mg tablet; Commonly known as: Lipitor   Ventolin HFA 90 mcg/actuation inhaler; Generic drug: albuterol   zolpidem 5 mg tablet; Commonly known as: Ambien     STOP taking these medications     hydrALAZINE 100 mg tablet; Commonly known as: Apresoline   losartan 100 mg tablet; Commonly known as: Cozaar   omeprazole 40 mg DR capsule; Commonly known as: PriLOSEC       Outpatient Follow-Up  Future Appointments   Date Time Provider Department [facility]   5/2/2025  8:00 AM TXP ABDOMINAL SURGEON [id number]   5/9/2025  8:30 AM TXP ABDOMINAL SURGEON [id number]   5/13/2025  8:50 AM Brooke Greenwood MD [id number]   5/14/2025 10:30 AM Rosita Cartwright RDN, ROSA M [id number]   5/15/2025  8:30 AM Disha Maynard PA-C [id number]   5/16/2025  8:00 AM TXP ABDOMINAL SURGEON [id number]   5/23/2025  9:30 AM TXP ABDOMINAL SURGEON [id number]     I spent 45 in the professional care and discharge planning of this patient.     Elizabeth Giang, APRN-CNP

## 2025-04-26 NOTE — NURSING NOTE
Report to Receiving RN:    Report To: ARELY Thayer  Time Report Called: 2050  Hand-Off Communication: Pt completed 3.5 hrs HD, 2L fluid removed, tolerated tx, /78, HR 57, pt stable, alert, no issues, transplant meds given at 1801.   Complications During Treatment: No  Ultrafiltration Treatment: Yes  Medications Administered During Dialysis: Yes, see MAR  Blood Products Administered During Dialysis: No  Labs Sent During Dialysis: No  Heparin Drip Rate Changes: No  Dialysis Catheter Dressing: N/A, AVF  Last Dressing Change: N/A    Last Updated: 8:53 PM by NARAYAN NEFF

## 2025-04-26 NOTE — CARE PLAN
The patient's goals for the shift include      The clinical goals for the shift include pain control    Problem: Pain - Adult  Goal: Verbalizes/displays adequate comfort level or baseline comfort level  Outcome: Progressing

## 2025-04-26 NOTE — PROGRESS NOTES
"Tanmay Mcneill is a 39 y.o. male on day 7 of admission presenting with ESRD (end stage renal disease) (Multi).    No acute issues overnight.   HD today. Remains oliguric, recorded UOP 125cc.    Scheduled medications  acetaminophen, 650 mg, oral, q6h  amLODIPine, 10 mg, oral, Daily  aspirin, 81 mg, oral, Daily  atorvastatin, 80 mg, oral, Daily  carvedilol, 12.5 mg, oral, BID  cholecalciferol, 25 mcg, oral, Daily  clotrimazole, 10 mg, Mouth/Throat, TID after meals  furosemide, 80 mg, oral, BID  heparin (porcine), 7,500 Units, subcutaneous, q8h ANURADHA  insulin glargine, 30 Units, subcutaneous, Daily  insulin lispro, 0-10 Units, subcutaneous, Before meals & nightly  insulin lispro, 14 Units, subcutaneous, Daily with evening meal  insulin lispro, 18 Units, subcutaneous, BID AC  lidocaine, 1 patch, transdermal, Daily  mycophenolate, 1,000 mg, oral, q12h ANURADHA  pantoprazole, 40 mg, oral, BID AC  predniSONE, 20 mg, oral, Daily  sulfamethoxazole-trimethoprim, 1 tablet, oral, Daily  tacrolimus, 3 mg, oral, q12h ANURADHA  valGANciclovir, 450 mg, oral, q48h      Continuous medications     PRN medications  PRN medications: dextrose, dextrose, glucagon, glucagon, methocarbamol, naloxone, ondansetron **OR** ondansetron, oxyCODONE, oxygen, polyethylene glycol, sennosides, traMADol        Last Recorded Vitals  Blood pressure (!) 181/64, pulse 68, temperature 35.5 °C (95.9 °F), temperature source Temporal, resp. rate 18, height 1.854 m (6' 1\"), weight (!) 161 kg (355 lb 6.1 oz), SpO2 99%.  Intake/Output last 3 Shifts:  I/O last 3 completed shifts:  In: 1900 (11.8 mL/kg) [P.O.:1800; IV Piggyback:100]  Out: 365 (2.3 mL/kg) [Urine:285 (0 mL/kg/hr); Drains:80]  Weight: 161.2 kg     A&ox3, no distress, pleasant  MMM, no lesions  Lungs with equal air entry, no added sounds  Rrr, no m/r/g  Abd soft, nt, nd  No allograft tenderness  trace edema b/l    Results for orders placed or performed during the hospital encounter of 04/18/25 (from the past 24 " hours)   Calcium, Ionized   Result Value Ref Range    POCT Calcium, Ionized 1.05 (L) 1.1 - 1.33 mmol/L   Magnesium   Result Value Ref Range    Magnesium 2.24 1.60 - 2.40 mg/dL   Renal Function Panel   Result Value Ref Range    Glucose 182 (H) 74 - 99 mg/dL    Sodium 136 136 - 145 mmol/L    Potassium 3.8 3.5 - 5.3 mmol/L    Chloride 95 (L) 98 - 107 mmol/L    Bicarbonate 25 21 - 32 mmol/L    Anion Gap 20 10 - 20 mmol/L    Urea Nitrogen 80 (H) 6 - 23 mg/dL    Creatinine 12.18 (H) 0.50 - 1.30 mg/dL    eGFR 5 (L) >60 mL/min/1.73m*2    Calcium 7.7 (L) 8.6 - 10.6 mg/dL    Phosphorus 6.4 (H) 2.5 - 4.9 mg/dL    Albumin 3.4 3.4 - 5.0 g/dL   CBC and Auto Differential   Result Value Ref Range    WBC 6.7 4.4 - 11.3 x10*3/uL    nRBC 0.3 (H) 0.0 - 0.0 /100 WBCs    RBC 3.20 (L) 4.50 - 5.90 x10*6/uL    Hemoglobin 7.9 (L) 13.5 - 17.5 g/dL    Hematocrit 24.2 (L) 41.0 - 52.0 %    MCV 76 (L) 80 - 100 fL    MCH 24.7 (L) 26.0 - 34.0 pg    MCHC 32.6 32.0 - 36.0 g/dL    RDW 17.1 (H) 11.5 - 14.5 %    Platelets 76 (L) 150 - 450 x10*3/uL    Neutrophils % 85.3 40.0 - 80.0 %    Immature Granulocytes %, Automated 1.9 (H) 0.0 - 0.9 %    Lymphocytes % 3.0 13.0 - 44.0 %    Monocytes % 9.1 2.0 - 10.0 %    Eosinophils % 0.7 0.0 - 6.0 %    Basophils % 0.0 0.0 - 2.0 %    Neutrophils Absolute 5.73 1.20 - 7.70 x10*3/uL    Immature Granulocytes Absolute, Automated 0.13 0.00 - 0.70 x10*3/uL    Lymphocytes Absolute 0.20 (L) 1.20 - 4.80 x10*3/uL    Monocytes Absolute 0.61 0.10 - 1.00 x10*3/uL    Eosinophils Absolute 0.05 0.00 - 0.70 x10*3/uL    Basophils Absolute 0.00 0.00 - 0.10 x10*3/uL   Tacrolimus   Result Value Ref Range    Tacrolimus  9.3 <=15.0 ng/mL   POCT GLUCOSE   Result Value Ref Range    POCT Glucose 158 (H) 74 - 99 mg/dL   POCT GLUCOSE   Result Value Ref Range    POCT Glucose 181 (H) 74 - 99 mg/dL   POCT GLUCOSE   Result Value Ref Range    POCT Glucose 244 (H) 74 - 99 mg/dL   POCT GLUCOSE   Result Value Ref Range    POCT Glucose 122 (H) 74 - 99  mg/dL       Assessment and Plan:    39 y.o.  male with ESRD sec to DM2, on HD since 1/19/2022 ([facility], MWF, [facility], Dr. Rhodes), HTN, obesity, asthma, arthritis currently s/p DDKT on 4/19/2025.  Anuric versus oliguric at baseline.  Been on dialysis for 3 years through right upper extremity AV fistula     Donor kidney info:  DCD, was lung tx recipient, Terminal Cr  1.08, KDPI: 48 %, PRA: 19 %, CMV: D-/R+, EBV: D+/R+, Toxo: Donor Negative, HCV Ab/MELISSA: Negative, HBcAB: Negative, HBsAg/HBV MELISSA: Negative. Full cava used as venous extension 2/2 depth    Allograft function:  - Expected DGF  - HD today per [facility] schedule. Monitor for RRT needs daily  - On PO Lasix 80mg bid.  - Ultrasound transplant unremarkable.  - 7-day Pimentel due to difficult Pimentel placement  - acceptable serum K, CO2.   - Hb 7.9, below goal. Monitor and transfuse as needed. Start 100mcg qweek.   - Hemodynamics: Stable Bps. On Coreg 12.5 mg bid. Monitor and titrate meds as indicated.    - CKD-MBD: hypoCa+, Phos acceptable. Replete lytes as indicated  - avoid potential nephrotoxins. Dose meds for CrCl  - WBC, plt stable     Immunosuppression:  - Thymo 4.5 mg/kg total completed 4/22/25.   - On Tac, MMF 1g q12, pred taper per protocol. Goal FK 8-10.  - Ppx: Bactrim, Valcyte (CMV -/+), Mycelex troches.     Rest of the management per primary team. Will follow    Juan Miguel Feliz MD

## 2025-04-26 NOTE — NURSING NOTE
Transplant Coordinator Note     Inpatient Discharge Education Provided to patient today. The following topics were reviewed:   signs and symptoms of rejection   signs and symptoms of infection   transplant medication indications   transplant medication administration   transplant medication side effects   the importance of following post-transplant lab and appointment schedules   Patient verbalized good understanding of all information provided   Patient demonstrated ability to fill pillbox without error or difficulty   Patient successfully completed discharge education test      Provided to patient all [facility] contact information for both during and after hours    Outpatient Plan  DGF on continued HD MWF at 1230 via AVF  No BRETT  Prevymis, acyclovir instead of valcyte d/t cost  Tac 3/3, MMF 1000/1000, pred 20  Labs prior to HD  First TI appt Fri 5/2 at 0800  No need for pill box checks in clinic

## 2025-04-26 NOTE — CARE PLAN
Problem: Pain - Adult  Goal: Verbalizes/displays adequate comfort level or baseline comfort level  Outcome: Progressing  Flowsheets (Taken 4/26/2025 0800)  Verbalizes/displays adequate comfort level or baseline comfort level:   Encourage patient to monitor pain and request assistance   Assess pain using appropriate pain scale   Administer analgesics based on type and severity of pain and evaluate response   Implement non-pharmacological measures as appropriate and evaluate response   Consider cultural and social influences on pain and pain management   Notify Licensed Independent Practitioner if interventions unsuccessful or patient reports new pain     Problem: Safety - Adult  Goal: Free from fall injury  Outcome: Progressing  Flowsheets (Taken 4/26/2025 0800)  Free from fall injury:   Instruct family/caregiver on patient safety   Based on caregiver fall risk screen, instruct family/caregiver to ask for assistance with transferring infant if caregiver noted to have fall risk factors     Problem: Discharge Planning  Goal: Discharge to home or other facility with appropriate resources  Outcome: Progressing  Flowsheets (Taken 4/26/2025 0800)  Discharge to home or other facility with appropriate resources: Identify barriers to discharge with patient and caregiver   The patient's goals for the shift include      The clinical goals for the shift include pt will remain HDS through out this shift    Over the shift, the patient did remain HDS throughout the shift

## 2025-04-26 NOTE — PATIENT INSTRUCTIONS
Medication Changes:  Decrease prednisone to 15mg every day  Stop sodium bicarbonate    Plan:  If that area of swelling gets hard or bigger, please let us know  Ok to use ice pack to make it feel better  Ok to use lidocaine patches next to but not on the incision if it helps with discomfort  Let us know if your blood pressure stays up in the 160 range  Labs 3 times a week before dialysis  Next clinic appt 5/9 at 8:30am

## 2025-04-26 NOTE — PROGRESS NOTES
"Tanmay Mcneill is a 39 y.o. male on day 7 of admission presenting with ESRD (end stage renal disease) (Multi).    No acute issues overnight.   No significant change in UOP.    Scheduled medications  acetaminophen, 650 mg, oral, q6h  amLODIPine, 10 mg, oral, Daily  aspirin, 81 mg, oral, Daily  atorvastatin, 80 mg, oral, Daily  carvedilol, 12.5 mg, oral, BID  cholecalciferol, 25 mcg, oral, Daily  clotrimazole, 10 mg, Mouth/Throat, TID after meals  furosemide, 80 mg, oral, BID  heparin (porcine), 7,500 Units, subcutaneous, q8h ANURADHA  insulin glargine, 30 Units, subcutaneous, Daily  insulin lispro, 0-10 Units, subcutaneous, Before meals & nightly  insulin lispro, 14 Units, subcutaneous, Daily with evening meal  insulin lispro, 18 Units, subcutaneous, BID AC  lidocaine, 1 patch, transdermal, Daily  mycophenolate, 1,000 mg, oral, q12h ANURADHA  pantoprazole, 40 mg, oral, BID AC  predniSONE, 20 mg, oral, Daily  sulfamethoxazole-trimethoprim, 1 tablet, oral, Daily  tacrolimus, 3 mg, oral, q12h ANURADHA  valGANciclovir, 450 mg, oral, q48h      Continuous medications     PRN medications  PRN medications: dextrose, dextrose, glucagon, glucagon, methocarbamol, naloxone, ondansetron **OR** ondansetron, oxyCODONE, oxygen, polyethylene glycol, sennosides, traMADol        Last Recorded Vitals  Blood pressure (!) 181/64, pulse 68, temperature 35.5 °C (95.9 °F), temperature source Temporal, resp. rate 18, height 1.854 m (6' 1\"), weight (!) 161 kg (355 lb 6.1 oz), SpO2 99%.  Intake/Output last 3 Shifts:  I/O last 3 completed shifts:  In: 1900 (11.8 mL/kg) [P.O.:1800; IV Piggyback:100]  Out: 365 (2.3 mL/kg) [Urine:285 (0 mL/kg/hr); Drains:80]  Weight: 161.2 kg     A&ox3, no distress, pleasant  MMM, no lesions  Lungs with equal air entry, no added sounds  Rrr, no m/r/g  Abd soft, nt, nd  No allograft tenderness  No edema b/l    Results for orders placed or performed during the hospital encounter of 04/18/25 (from the past 24 hours)   Calcium, " Ionized   Result Value Ref Range    POCT Calcium, Ionized 1.05 (L) 1.1 - 1.33 mmol/L   Magnesium   Result Value Ref Range    Magnesium 2.24 1.60 - 2.40 mg/dL   Renal Function Panel   Result Value Ref Range    Glucose 182 (H) 74 - 99 mg/dL    Sodium 136 136 - 145 mmol/L    Potassium 3.8 3.5 - 5.3 mmol/L    Chloride 95 (L) 98 - 107 mmol/L    Bicarbonate 25 21 - 32 mmol/L    Anion Gap 20 10 - 20 mmol/L    Urea Nitrogen 80 (H) 6 - 23 mg/dL    Creatinine 12.18 (H) 0.50 - 1.30 mg/dL    eGFR 5 (L) >60 mL/min/1.73m*2    Calcium 7.7 (L) 8.6 - 10.6 mg/dL    Phosphorus 6.4 (H) 2.5 - 4.9 mg/dL    Albumin 3.4 3.4 - 5.0 g/dL   CBC and Auto Differential   Result Value Ref Range    WBC 6.7 4.4 - 11.3 x10*3/uL    nRBC 0.3 (H) 0.0 - 0.0 /100 WBCs    RBC 3.20 (L) 4.50 - 5.90 x10*6/uL    Hemoglobin 7.9 (L) 13.5 - 17.5 g/dL    Hematocrit 24.2 (L) 41.0 - 52.0 %    MCV 76 (L) 80 - 100 fL    MCH 24.7 (L) 26.0 - 34.0 pg    MCHC 32.6 32.0 - 36.0 g/dL    RDW 17.1 (H) 11.5 - 14.5 %    Platelets 76 (L) 150 - 450 x10*3/uL    Neutrophils % 85.3 40.0 - 80.0 %    Immature Granulocytes %, Automated 1.9 (H) 0.0 - 0.9 %    Lymphocytes % 3.0 13.0 - 44.0 %    Monocytes % 9.1 2.0 - 10.0 %    Eosinophils % 0.7 0.0 - 6.0 %    Basophils % 0.0 0.0 - 2.0 %    Neutrophils Absolute 5.73 1.20 - 7.70 x10*3/uL    Immature Granulocytes Absolute, Automated 0.13 0.00 - 0.70 x10*3/uL    Lymphocytes Absolute 0.20 (L) 1.20 - 4.80 x10*3/uL    Monocytes Absolute 0.61 0.10 - 1.00 x10*3/uL    Eosinophils Absolute 0.05 0.00 - 0.70 x10*3/uL    Basophils Absolute 0.00 0.00 - 0.10 x10*3/uL   Tacrolimus   Result Value Ref Range    Tacrolimus  9.3 <=15.0 ng/mL   POCT GLUCOSE   Result Value Ref Range    POCT Glucose 158 (H) 74 - 99 mg/dL   POCT GLUCOSE   Result Value Ref Range    POCT Glucose 181 (H) 74 - 99 mg/dL   POCT GLUCOSE   Result Value Ref Range    POCT Glucose 244 (H) 74 - 99 mg/dL   POCT GLUCOSE   Result Value Ref Range    POCT Glucose 122 (H) 74 - 99 mg/dL        Assessment and Plan:    39 y.o.  male with ESRD sec to DM2, on HD since 1/19/2022 ([facility], MWF, [facility], Dr. Rhodes), HTN, obesity, asthma, arthritis currently s/p DDKT on 4/19/2025.  Anuric versus oliguric at baseline.  Been on dialysis for 3 years through right upper extremity AV fistula     Donor kidney info:  DCD, was lung tx recipient, Terminal Cr  1.08, KDPI: 48 %, PRA: 19 %, CMV: D-/R+, EBV: D+/R+, Toxo: Donor Negative, HCV Ab/MELISSA: Negative, HBcAB: Negative,  HBsAg/HBV MELISSA: Negative. Full cava used as venous extension 2/2 depth    Allograft function:  - Expecting DGF  - HD 4/23. No indication for RRT today, plan for HD MWF.  - On PO Lasix 80mg bid.  - Ultrasound transplant unremarkable.  - 7-day Pimentel due to difficult Pimentel placement  - acceptable serum K, CO2.   - Hb 9.2. Monitor and transfuse as needed. ISAAC if remains <9.  - Hemodynamics: Stable Bps. On Coreg 12.5 mg bid. Monitor and titrate meds as indicated.    - CKD-MBD: Ca, Phos acceptable. Replete Mg as indicated  - avoid potential nephrotoxins. Dose meds for CrCl  - WBC, plt stable     Immunosuppression:  - Thymo 4.5 mg/kg total completed 4/22/25.   - On Tac, MMF 1g q12, pred taper per protocol. Goal FK 8-10.  - Ppx: Bactrim, Valcyte (CMV -/+), Mycelex troches.     Rest of the management per primary team. Will follow    Juan Miguel Feliz MD

## 2025-04-28 ENCOUNTER — LAB (OUTPATIENT)
Dept: LAB | Facility: HOSPITAL | Age: 40
End: 2025-04-28
Payer: COMMERCIAL

## 2025-04-28 ENCOUNTER — TELEPHONE (OUTPATIENT)
Facility: HOSPITAL | Age: 40
End: 2025-04-28
Payer: COMMERCIAL

## 2025-04-28 ENCOUNTER — HOSPITAL ENCOUNTER (OUTPATIENT)
Dept: DIALYSIS | Facility: HOSPITAL | Age: 40
Setting detail: DIALYSIS SERIES
Discharge: HOME | End: 2025-04-28
Payer: COMMERCIAL

## 2025-04-28 ENCOUNTER — DOCUMENTATION (OUTPATIENT)
Facility: HOSPITAL | Age: 40
End: 2025-04-28

## 2025-04-28 VITALS — DIASTOLIC BLOOD PRESSURE: 53 MMHG | SYSTOLIC BLOOD PRESSURE: 128 MMHG | HEART RATE: 66 BPM | TEMPERATURE: 97.5 F

## 2025-04-28 DIAGNOSIS — D63.1 ANEMIA IN CHRONIC KIDNEY DISEASE, UNSPECIFIED CKD STAGE: ICD-10-CM

## 2025-04-28 DIAGNOSIS — Z94.0 KIDNEY REPLACED BY TRANSPLANT (HHS-HCC): ICD-10-CM

## 2025-04-28 DIAGNOSIS — Z94.0 KIDNEY TRANSPLANT STATUS: Primary | ICD-10-CM

## 2025-04-28 DIAGNOSIS — T86.19 DELAYED GRAFT FUNCTION OF KIDNEY (HHS-HCC): Primary | ICD-10-CM

## 2025-04-28 DIAGNOSIS — N18.9 ANEMIA IN CHRONIC KIDNEY DISEASE, UNSPECIFIED CKD STAGE: ICD-10-CM

## 2025-04-28 DIAGNOSIS — D63.1 ANEMIA DUE TO CHRONIC KIDNEY DISEASE, UNSPECIFIED CKD STAGE: ICD-10-CM

## 2025-04-28 DIAGNOSIS — Z94.0 KIDNEY REPLACED BY TRANSPLANT (HHS-HCC): Primary | ICD-10-CM

## 2025-04-28 DIAGNOSIS — N18.9 ANEMIA DUE TO CHRONIC KIDNEY DISEASE, UNSPECIFIED CKD STAGE: ICD-10-CM

## 2025-04-28 LAB
ALBUMIN SERPL BCP-MCNC: 3.6 G/DL (ref 3.4–5)
ANION GAP SERPL CALC-SCNC: 20 MMOL/L (ref 10–20)
BUN SERPL-MCNC: 83 MG/DL (ref 6–23)
CALCIUM SERPL-MCNC: 7.3 MG/DL (ref 8.6–10.6)
CHLORIDE SERPL-SCNC: 95 MMOL/L (ref 98–107)
CO2 SERPL-SCNC: 26 MMOL/L (ref 21–32)
CREAT SERPL-MCNC: 14.03 MG/DL (ref 0.5–1.3)
EGFRCR SERPLBLD CKD-EPI 2021: 4 ML/MIN/1.73M*2
ERYTHROCYTE [DISTWIDTH] IN BLOOD BY AUTOMATED COUNT: 17.6 % (ref 11.5–14.5)
GLUCOSE SERPL-MCNC: 205 MG/DL (ref 74–99)
HCT VFR BLD AUTO: 22.8 % (ref 41–52)
HGB BLD-MCNC: 7 G/DL (ref 13.5–17.5)
MAGNESIUM SERPL-MCNC: 2.13 MG/DL (ref 1.6–2.4)
MCH RBC QN AUTO: 24.2 PG (ref 26–34)
MCHC RBC AUTO-ENTMCNC: 30.7 G/DL (ref 32–36)
MCV RBC AUTO: 79 FL (ref 80–100)
NRBC BLD-RTO: 0 /100 WBCS (ref 0–0)
PHOSPHATE SERPL-MCNC: 7.2 MG/DL (ref 2.5–4.9)
PLATELET # BLD AUTO: 152 X10*3/UL (ref 150–450)
POTASSIUM SERPL-SCNC: 3.7 MMOL/L (ref 3.5–5.3)
RBC # BLD AUTO: 2.89 X10*6/UL (ref 4.5–5.9)
SODIUM SERPL-SCNC: 137 MMOL/L (ref 136–145)
TACROLIMUS BLD-MCNC: 7.8 NG/ML
WBC # BLD AUTO: 6.6 X10*3/UL (ref 4.4–11.3)

## 2025-04-28 PROCEDURE — 85027 COMPLETE CBC AUTOMATED: CPT

## 2025-04-28 PROCEDURE — 90937 HEMODIALYSIS REPEATED EVAL: CPT

## 2025-04-28 PROCEDURE — 83735 ASSAY OF MAGNESIUM: CPT

## 2025-04-28 PROCEDURE — 90935 HEMODIALYSIS ONE EVALUATION: CPT | Performed by: STUDENT IN AN ORGANIZED HEALTH CARE EDUCATION/TRAINING PROGRAM

## 2025-04-28 PROCEDURE — 80197 ASSAY OF TACROLIMUS: CPT

## 2025-04-28 PROCEDURE — 80069 RENAL FUNCTION PANEL: CPT

## 2025-04-28 RX ORDER — DIPHENHYDRAMINE HCL 25 MG
25 CAPSULE ORAL
Status: CANCELLED | OUTPATIENT
Start: 2025-04-29

## 2025-04-28 RX ORDER — ACETAMINOPHEN 325 MG/1
650 TABLET ORAL EVERY 4 HOURS PRN
Status: CANCELLED | OUTPATIENT
Start: 2025-04-29

## 2025-04-28 RX ORDER — LIDOCAINE AND PRILOCAINE 25; 25 MG/G; MG/G
1 CREAM TOPICAL AS NEEDED
Status: CANCELLED | OUTPATIENT
Start: 2025-04-29

## 2025-04-28 RX ORDER — LIDOCAINE AND PRILOCAINE 25; 25 MG/G; MG/G
1 CREAM TOPICAL AS NEEDED
Status: CANCELLED | OUTPATIENT
Start: 2025-05-05

## 2025-04-28 RX ORDER — ONDANSETRON HYDROCHLORIDE 2 MG/ML
4 INJECTION, SOLUTION INTRAVENOUS
Status: CANCELLED | OUTPATIENT
Start: 2025-04-29

## 2025-04-28 RX ORDER — ONDANSETRON HYDROCHLORIDE 2 MG/ML
4 INJECTION, SOLUTION INTRAVENOUS
Status: CANCELLED | OUTPATIENT
Start: 2025-05-05

## 2025-04-28 RX ORDER — ACETAMINOPHEN 325 MG/1
650 TABLET ORAL EVERY 4 HOURS PRN
Status: CANCELLED | OUTPATIENT
Start: 2025-05-05

## 2025-04-28 RX ORDER — DIPHENHYDRAMINE HCL 25 MG
25 CAPSULE ORAL
Status: CANCELLED | OUTPATIENT
Start: 2025-05-05

## 2025-04-28 ASSESSMENT — PAIN SCALES - GENERAL: PAINLEVEL_OUTOF10: 0 - NO PAIN

## 2025-04-28 ASSESSMENT — PAIN - FUNCTIONAL ASSESSMENT: PAIN_FUNCTIONAL_ASSESSMENT: NO/DENIES PAIN

## 2025-04-28 NOTE — TELEPHONE ENCOUNTER
Pt returned call- Pt states he gets labs then has a 12 chair time for dialysis and he is having issues with transportation and would like to know if he can get labs drawn at dialysis, spoke with sana who advised the plan was labs in am for accurate tac trough then dialysis at noon. Sana states she will review with nephrologist and see if we can get pt earlier chair time. Relayed all info to pt- pt understandable.

## 2025-04-28 NOTE — TELEPHONE ENCOUNTER
Patient called requesting to speak with coordinator about has some questions about lab going to dialysis it is two much he's going to be late to dialysis.  Patient call back number is [phone]

## 2025-04-28 NOTE — PROGRESS NOTES
Tanmay Mcneill is a 39 y.o. male post DDKT 4/19/25 with DGF presenting for HD.    Subjective   Doing well. No N/V/D. No issues with medications. C/o leakage from incision without pain.  UOP ~ 500 mL/day       Objective   GA: Aox3, NAD, no JVD  Lungs: unlabored breathing  Trace edema  Abd: incision C/D/I. Soft, no tenderness  Access: left arm AVG    Last Recorded Vitals  Blood pressure 129/61, pulse 60, temperature 36.4 °C (97.5 °F), temperature source Temporal.  Intake/Output last 3 Shifts:  No intake/output data recorded.    Relevant Results  Cr 14.03, BUN 83, K 3.7  CBC: Hb 7, WBC 6.6, Plt 153  TAC level pending    Assessment & Plan  Delayed graft function of kidney (HHS-HCC)    Anemia due to chronic kidney disease, unspecified CKD stage    Continue HD MWF for now  UF 2-3 L per session  Monitor UOP and Renal function panel closely    Anemia - plan 2 units of pRBC at next session      I spent 30 minutes in the professional and overall care of this patient.      Jacquie Jackson MD

## 2025-04-28 NOTE — PROGRESS NOTES
Transplant event completed in [facility] on 04/28/2025.  Verified serologies in Epic with [facility] data.  Recipient information form submitted.

## 2025-04-29 ENCOUNTER — TELEPHONE (OUTPATIENT)
Facility: HOSPITAL | Age: 40
End: 2025-04-29
Payer: COMMERCIAL

## 2025-04-29 DIAGNOSIS — Z94.9 TRANSPLANT: ICD-10-CM

## 2025-04-29 DIAGNOSIS — D63.1 ANEMIA DUE TO CHRONIC KIDNEY DISEASE, UNSPECIFIED CKD STAGE: Primary | ICD-10-CM

## 2025-04-29 DIAGNOSIS — N18.9 ANEMIA DUE TO CHRONIC KIDNEY DISEASE, UNSPECIFIED CKD STAGE: Primary | ICD-10-CM

## 2025-04-29 NOTE — TELEPHONE ENCOUNTER
Labs reviewed with Dr Rodriguez    tac 7.8 from 7.5 after inc to 4/4 on 4/26    PLAN: increase tac 5 mg BID    Called pt- pt agreeable to plan.

## 2025-04-30 ENCOUNTER — LAB REQUISITION (OUTPATIENT)
Dept: LAB | Facility: CLINIC | Age: 40
End: 2025-04-30
Payer: COMMERCIAL

## 2025-04-30 ENCOUNTER — NURSE ONLY (OUTPATIENT)
Facility: HOSPITAL | Age: 40
End: 2025-04-30
Payer: COMMERCIAL

## 2025-04-30 ENCOUNTER — DOCUMENTATION (OUTPATIENT)
Facility: HOSPITAL | Age: 40
End: 2025-04-30

## 2025-04-30 ENCOUNTER — HOSPITAL ENCOUNTER (OUTPATIENT)
Dept: DIALYSIS | Facility: HOSPITAL | Age: 40
Setting detail: DIALYSIS SERIES
Discharge: HOME | End: 2025-04-30
Payer: COMMERCIAL

## 2025-04-30 VITALS
HEART RATE: 60 BPM | TEMPERATURE: 96.8 F | RESPIRATION RATE: 18 BRPM | DIASTOLIC BLOOD PRESSURE: 67 MMHG | SYSTOLIC BLOOD PRESSURE: 165 MMHG

## 2025-04-30 DIAGNOSIS — N18.9 ANEMIA IN CHRONIC KIDNEY DISEASE, UNSPECIFIED CKD STAGE: ICD-10-CM

## 2025-04-30 DIAGNOSIS — N18.6 END STAGE RENAL DISEASE (MULTI): ICD-10-CM

## 2025-04-30 DIAGNOSIS — D63.1 ANEMIA IN CHRONIC KIDNEY DISEASE, UNSPECIFIED CKD STAGE: ICD-10-CM

## 2025-04-30 DIAGNOSIS — Z94.0 KIDNEY REPLACED BY TRANSPLANT (HHS-HCC): ICD-10-CM

## 2025-04-30 DIAGNOSIS — T86.19 DELAYED GRAFT FUNCTION OF KIDNEY (HHS-HCC): Primary | ICD-10-CM

## 2025-04-30 LAB
ABO GROUP (TYPE) IN BLOOD: NORMAL
ALBUMIN SERPL BCP-MCNC: 3.8 G/DL (ref 3.4–5)
ANION GAP SERPL CALC-SCNC: 22 MMOL/L (ref 10–20)
ANTIBODY SCREEN: NORMAL
BUN SERPL-MCNC: 63 MG/DL (ref 6–23)
CALCIUM SERPL-MCNC: 7.5 MG/DL (ref 8.6–10.6)
CHLORIDE SERPL-SCNC: 93 MMOL/L (ref 98–107)
CO2 SERPL-SCNC: 27 MMOL/L (ref 21–32)
CREAT SERPL-MCNC: 12.08 MG/DL (ref 0.5–1.3)
DIAGNOSIS-VIRTUAL CROSSMATCH: NORMAL
EGFRCR SERPLBLD CKD-EPI 2021: 5 ML/MIN/1.73M*2
ERYTHROCYTE [DISTWIDTH] IN BLOOD BY AUTOMATED COUNT: 18 % (ref 11.5–14.5)
FERRITIN SERPL-MCNC: 1760 NG/ML (ref 20–300)
FLOW ALLOCROSSMATCH PEAK: NORMAL
FLOW ALLOCROSSMATCH: NORMAL
GLUCOSE SERPL-MCNC: 114 MG/DL (ref 74–99)
HCT VFR BLD AUTO: 24.6 % (ref 41–52)
HGB BLD-MCNC: 7.6 G/DL (ref 13.5–17.5)
HLA RESULTS: NORMAL
HLA RESULTS: NORMAL
IRON SATN MFR SERPL: 48 % (ref 25–45)
IRON SERPL-MCNC: 102 UG/DL (ref 35–150)
MAGNESIUM SERPL-MCNC: 2.2 MG/DL (ref 1.6–2.4)
MCH RBC QN AUTO: 24.3 PG (ref 26–34)
MCHC RBC AUTO-ENTMCNC: 30.9 G/DL (ref 32–36)
MCV RBC AUTO: 79 FL (ref 80–100)
NRBC BLD-RTO: 0 /100 WBCS (ref 0–0)
PATH REVIEW-VIRTUAL CROSSMATCH: NORMAL
PATHOLOGIST ID-VIRTUAL CROSSMATCH: NORMAL
PHOSPHATE SERPL-MCNC: 7.2 MG/DL (ref 2.5–4.9)
PLATELET # BLD AUTO: 259 X10*3/UL (ref 150–450)
POTASSIUM SERPL-SCNC: 3.8 MMOL/L (ref 3.5–5.3)
RBC # BLD AUTO: 3.13 X10*6/UL (ref 4.5–5.9)
RH FACTOR (ANTIGEN D): NORMAL
SODIUM SERPL-SCNC: 138 MMOL/L (ref 136–145)
TACROLIMUS BLD-MCNC: 9.8 NG/ML
TIBC SERPL-MCNC: 213 UG/DL (ref 240–445)
UIBC SERPL-MCNC: 111 UG/DL (ref 110–370)
WBC # BLD AUTO: 8 X10*3/UL (ref 4.4–11.3)

## 2025-04-30 PROCEDURE — 83540 ASSAY OF IRON: CPT

## 2025-04-30 PROCEDURE — 86901 BLOOD TYPING SEROLOGIC RH(D): CPT | Performed by: STUDENT IN AN ORGANIZED HEALTH CARE EDUCATION/TRAINING PROGRAM

## 2025-04-30 PROCEDURE — 83735 ASSAY OF MAGNESIUM: CPT | Performed by: STUDENT IN AN ORGANIZED HEALTH CARE EDUCATION/TRAINING PROGRAM

## 2025-04-30 PROCEDURE — 83550 IRON BINDING TEST: CPT

## 2025-04-30 PROCEDURE — P9040 RBC LEUKOREDUCED IRRADIATED: HCPCS | Mod: V7

## 2025-04-30 PROCEDURE — 90937 HEMODIALYSIS REPEATED EVAL: CPT | Mod: V7

## 2025-04-30 PROCEDURE — 36415 COLL VENOUS BLD VENIPUNCTURE: CPT | Performed by: STUDENT IN AN ORGANIZED HEALTH CARE EDUCATION/TRAINING PROGRAM

## 2025-04-30 PROCEDURE — 36430 TRANSFUSION BLD/BLD COMPNT: CPT | Mod: V7

## 2025-04-30 PROCEDURE — 86923 COMPATIBILITY TEST ELECTRIC: CPT | Mod: V7

## 2025-04-30 PROCEDURE — 82728 ASSAY OF FERRITIN: CPT

## 2025-04-30 PROCEDURE — 86900 BLOOD TYPING SEROLOGIC ABO: CPT | Performed by: STUDENT IN AN ORGANIZED HEALTH CARE EDUCATION/TRAINING PROGRAM

## 2025-04-30 PROCEDURE — 85027 COMPLETE CBC AUTOMATED: CPT | Performed by: STUDENT IN AN ORGANIZED HEALTH CARE EDUCATION/TRAINING PROGRAM

## 2025-04-30 PROCEDURE — 80197 ASSAY OF TACROLIMUS: CPT | Performed by: STUDENT IN AN ORGANIZED HEALTH CARE EDUCATION/TRAINING PROGRAM

## 2025-04-30 PROCEDURE — 80069 RENAL FUNCTION PANEL: CPT | Performed by: STUDENT IN AN ORGANIZED HEALTH CARE EDUCATION/TRAINING PROGRAM

## 2025-04-30 RX ORDER — ACETAMINOPHEN 325 MG/1
650 TABLET ORAL EVERY 4 HOURS PRN
OUTPATIENT
Start: 2025-05-05

## 2025-04-30 RX ORDER — ONDANSETRON HYDROCHLORIDE 2 MG/ML
4 INJECTION, SOLUTION INTRAVENOUS
OUTPATIENT
Start: 2025-05-05

## 2025-04-30 RX ORDER — ONDANSETRON HYDROCHLORIDE 2 MG/ML
4 INJECTION, SOLUTION INTRAVENOUS
Status: DISCONTINUED | OUTPATIENT
Start: 2025-04-30 | End: 2025-05-01 | Stop reason: HOSPADM

## 2025-04-30 RX ORDER — DIPHENHYDRAMINE HCL 25 MG
25 CAPSULE ORAL
Status: DISCONTINUED | OUTPATIENT
Start: 2025-04-30 | End: 2025-05-01 | Stop reason: HOSPADM

## 2025-04-30 RX ORDER — LIDOCAINE AND PRILOCAINE 25; 25 MG/G; MG/G
1 CREAM TOPICAL AS NEEDED
OUTPATIENT
Start: 2025-05-05

## 2025-04-30 RX ORDER — ACETAMINOPHEN 325 MG/1
650 TABLET ORAL EVERY 4 HOURS PRN
Status: DISCONTINUED | OUTPATIENT
Start: 2025-04-30 | End: 2025-05-01 | Stop reason: HOSPADM

## 2025-04-30 RX ORDER — DIPHENHYDRAMINE HCL 25 MG
25 CAPSULE ORAL
OUTPATIENT
Start: 2025-05-05

## 2025-04-30 ASSESSMENT — PAIN - FUNCTIONAL ASSESSMENT: PAIN_FUNCTIONAL_ASSESSMENT: NO/DENIES PAIN

## 2025-04-30 ASSESSMENT — PAIN SCALES - PAIN ASSESSMENT IN ADVANCED DEMENTIA (PAINAD): BODYLANGUAGE: NORMAL

## 2025-04-30 NOTE — PROGRESS NOTES
Notified that patient had called with concern about redness and swelling at incision. Patient was not seen in clinic by coordinator as planned. Resident visited patient while at HD unit and relayed there were no concerns noted. Also reported that patient requested dose clarification of medication change. Called patient and left detailed message reiterating new tacrolimus dose of 5mg bid. Asked patient to call office in the morning if any concerns or questions.

## 2025-04-30 NOTE — TELEPHONE ENCOUNTER
Patient called requesting to speak with coordinator about swollen incision . Will be here at 10:30  to get blood and dialysis at noon would like to have his incision looked at it. He says he's not in any pain but it is red over the transplanted site.  Patient call back number is [phone].

## 2025-05-01 ENCOUNTER — SPECIALTY PHARMACY (OUTPATIENT)
Dept: PHARMACY | Facility: CLINIC | Age: 40
End: 2025-05-01

## 2025-05-01 ENCOUNTER — TELEPHONE (OUTPATIENT)
Facility: HOSPITAL | Age: 40
End: 2025-05-01
Payer: COMMERCIAL

## 2025-05-01 LAB
BLOOD EXPIRATION DATE: NORMAL
BLOOD EXPIRATION DATE: NORMAL
DISPENSE STATUS: NORMAL
DISPENSE STATUS: NORMAL
PRODUCT BLOOD TYPE: 5100
PRODUCT BLOOD TYPE: 5100
PRODUCT CODE: NORMAL
PRODUCT CODE: NORMAL
UNIT ABO: NORMAL
UNIT ABO: NORMAL
UNIT NUMBER: NORMAL
UNIT NUMBER: NORMAL
UNIT RH: NORMAL
UNIT RH: NORMAL
UNIT VOLUME: 280
UNIT VOLUME: 281
XM INTEP: NORMAL
XM INTEP: NORMAL

## 2025-05-01 RX ORDER — TACROLIMUS 1 MG/1
5 CAPSULE ORAL 2 TIMES DAILY
Qty: 300 CAPSULE | Refills: 0 | Status: SHIPPED | OUTPATIENT
Start: 2025-05-01 | End: 2025-05-31

## 2025-05-01 NOTE — TELEPHONE ENCOUNTER
Patient called requesting to speak with coordinator about looking to clarify the dosage changes left on a voicemail. Cannot clearly make out what the coordinator is asking.  Patient call back number is [phone] .

## 2025-05-02 ENCOUNTER — HOSPITAL ENCOUNTER (OUTPATIENT)
Dept: DIALYSIS | Facility: HOSPITAL | Age: 40
Setting detail: DIALYSIS SERIES
Discharge: HOME | End: 2025-05-02
Payer: COMMERCIAL

## 2025-05-02 ENCOUNTER — TELEPHONE (OUTPATIENT)
Facility: HOSPITAL | Age: 40
End: 2025-05-02

## 2025-05-02 ENCOUNTER — OFFICE VISIT (OUTPATIENT)
Facility: HOSPITAL | Age: 40
End: 2025-05-02
Payer: COMMERCIAL

## 2025-05-02 ENCOUNTER — PATIENT MESSAGE (OUTPATIENT)
Facility: HOSPITAL | Age: 40
End: 2025-05-02

## 2025-05-02 ENCOUNTER — APPOINTMENT (OUTPATIENT)
Facility: HOSPITAL | Age: 40
End: 2025-05-02
Payer: COMMERCIAL

## 2025-05-02 VITALS
HEART RATE: 74 BPM | BODY MASS INDEX: 46.84 KG/M2 | DIASTOLIC BLOOD PRESSURE: 64 MMHG | WEIGHT: 315 LBS | TEMPERATURE: 97.6 F | SYSTOLIC BLOOD PRESSURE: 157 MMHG | OXYGEN SATURATION: 96 %

## 2025-05-02 VITALS — TEMPERATURE: 97.9 F | HEART RATE: 59 BPM | SYSTOLIC BLOOD PRESSURE: 163 MMHG | DIASTOLIC BLOOD PRESSURE: 63 MMHG

## 2025-05-02 DIAGNOSIS — Z79.4 TYPE 2 DIABETES MELLITUS WITH HYPERGLYCEMIA, WITH LONG-TERM CURRENT USE OF INSULIN: Chronic | ICD-10-CM

## 2025-05-02 DIAGNOSIS — R73.9 STEROID-INDUCED HYPERGLYCEMIA: ICD-10-CM

## 2025-05-02 DIAGNOSIS — Z79.899 IMMUNOSUPPRESSIVE MANAGEMENT ENCOUNTER FOLLOWING KIDNEY TRANSPLANT: Primary | ICD-10-CM

## 2025-05-02 DIAGNOSIS — E11.65 TYPE 2 DIABETES MELLITUS WITH HYPERGLYCEMIA, WITH LONG-TERM CURRENT USE OF INSULIN: Chronic | ICD-10-CM

## 2025-05-02 DIAGNOSIS — Z94.0 IMMUNOSUPPRESSIVE MANAGEMENT ENCOUNTER FOLLOWING KIDNEY TRANSPLANT: Primary | ICD-10-CM

## 2025-05-02 DIAGNOSIS — Z94.0 KIDNEY REPLACED BY TRANSPLANT (HHS-HCC): ICD-10-CM

## 2025-05-02 DIAGNOSIS — E55.9 VITAMIN D DEFICIENCY: ICD-10-CM

## 2025-05-02 DIAGNOSIS — T38.0X5A STEROID-INDUCED HYPERGLYCEMIA: ICD-10-CM

## 2025-05-02 DIAGNOSIS — T86.19 DELAYED GRAFT FUNCTION OF KIDNEY (HHS-HCC): Primary | ICD-10-CM

## 2025-05-02 DIAGNOSIS — Z94.9 TRANSPLANT: ICD-10-CM

## 2025-05-02 LAB
ALBUMIN SERPL BCP-MCNC: 3.6 G/DL (ref 3.4–5)
ALBUMIN SERPL BCP-MCNC: 3.7 G/DL (ref 3.4–5)
ANION GAP SERPL CALC-SCNC: 18 MMOL/L (ref 10–20)
ANION GAP SERPL CALC-SCNC: 20 MMOL/L (ref 10–20)
BUN SERPL-MCNC: 50 MG/DL (ref 6–23)
BUN SERPL-MCNC: 50 MG/DL (ref 6–23)
CALCIUM SERPL-MCNC: 7.3 MG/DL (ref 8.6–10.6)
CALCIUM SERPL-MCNC: 7.4 MG/DL (ref 8.6–10.6)
CHLORIDE SERPL-SCNC: 95 MMOL/L (ref 98–107)
CHLORIDE SERPL-SCNC: 95 MMOL/L (ref 98–107)
CO2 SERPL-SCNC: 26 MMOL/L (ref 21–32)
CO2 SERPL-SCNC: 26 MMOL/L (ref 21–32)
CREAT SERPL-MCNC: 10.96 MG/DL (ref 0.5–1.3)
CREAT SERPL-MCNC: 11.56 MG/DL (ref 0.5–1.3)
EGFRCR SERPLBLD CKD-EPI 2021: 5 ML/MIN/1.73M*2
EGFRCR SERPLBLD CKD-EPI 2021: 5 ML/MIN/1.73M*2
ERYTHROCYTE [DISTWIDTH] IN BLOOD BY AUTOMATED COUNT: 17.7 % (ref 11.5–14.5)
ERYTHROCYTE [DISTWIDTH] IN BLOOD BY AUTOMATED COUNT: 18 % (ref 11.5–14.5)
GLUCOSE SERPL-MCNC: 206 MG/DL (ref 74–99)
GLUCOSE SERPL-MCNC: 248 MG/DL (ref 74–99)
HCT VFR BLD AUTO: 24.9 % (ref 41–52)
HCT VFR BLD AUTO: 27.1 % (ref 41–52)
HGB BLD-MCNC: 7.8 G/DL (ref 13.5–17.5)
HGB BLD-MCNC: 8.3 G/DL (ref 13.5–17.5)
MAGNESIUM SERPL-MCNC: 1.91 MG/DL (ref 1.6–2.4)
MAGNESIUM SERPL-MCNC: 2.07 MG/DL (ref 1.6–2.4)
MCH RBC QN AUTO: 24.6 PG (ref 26–34)
MCH RBC QN AUTO: 24.9 PG (ref 26–34)
MCHC RBC AUTO-ENTMCNC: 30.6 G/DL (ref 32–36)
MCHC RBC AUTO-ENTMCNC: 31.3 G/DL (ref 32–36)
MCV RBC AUTO: 80 FL (ref 80–100)
MCV RBC AUTO: 80 FL (ref 80–100)
NRBC BLD-RTO: 0 /100 WBCS (ref 0–0)
NRBC BLD-RTO: 0 /100 WBCS (ref 0–0)
PHOSPHATE SERPL-MCNC: 6.1 MG/DL (ref 2.5–4.9)
PHOSPHATE SERPL-MCNC: 6.2 MG/DL (ref 2.5–4.9)
PLATELET # BLD AUTO: 259 X10*3/UL (ref 150–450)
PLATELET # BLD AUTO: 268 X10*3/UL (ref 150–450)
POTASSIUM SERPL-SCNC: 3.7 MMOL/L (ref 3.5–5.3)
POTASSIUM SERPL-SCNC: 4.1 MMOL/L (ref 3.5–5.3)
RBC # BLD AUTO: 3.13 X10*6/UL (ref 4.5–5.9)
RBC # BLD AUTO: 3.38 X10*6/UL (ref 4.5–5.9)
SODIUM SERPL-SCNC: 135 MMOL/L (ref 136–145)
SODIUM SERPL-SCNC: 137 MMOL/L (ref 136–145)
TACROLIMUS BLD-MCNC: 16.5 NG/ML
TACROLIMUS BLD-MCNC: 19 NG/ML
WBC # BLD AUTO: 12.2 X10*3/UL (ref 4.4–11.3)
WBC # BLD AUTO: 12.6 X10*3/UL (ref 4.4–11.3)

## 2025-05-02 PROCEDURE — 80069 RENAL FUNCTION PANEL: CPT | Performed by: INTERNAL MEDICINE

## 2025-05-02 PROCEDURE — 80069 RENAL FUNCTION PANEL: CPT | Performed by: STUDENT IN AN ORGANIZED HEALTH CARE EDUCATION/TRAINING PROGRAM

## 2025-05-02 PROCEDURE — 85027 COMPLETE CBC AUTOMATED: CPT | Performed by: STUDENT IN AN ORGANIZED HEALTH CARE EDUCATION/TRAINING PROGRAM

## 2025-05-02 PROCEDURE — 3077F SYST BP >= 140 MM HG: CPT | Performed by: STUDENT IN AN ORGANIZED HEALTH CARE EDUCATION/TRAINING PROGRAM

## 2025-05-02 PROCEDURE — 90935 HEMODIALYSIS ONE EVALUATION: CPT | Performed by: STUDENT IN AN ORGANIZED HEALTH CARE EDUCATION/TRAINING PROGRAM

## 2025-05-02 PROCEDURE — 2500000004 HC RX 250 GENERAL PHARMACY W/ HCPCS (ALT 636 FOR OP/ED): Mod: JZ,TB | Performed by: STUDENT IN AN ORGANIZED HEALTH CARE EDUCATION/TRAINING PROGRAM

## 2025-05-02 PROCEDURE — 90937 HEMODIALYSIS REPEATED EVAL: CPT

## 2025-05-02 PROCEDURE — 99215 OFFICE O/P EST HI 40 MIN: CPT | Mod: 24

## 2025-05-02 PROCEDURE — 85027 COMPLETE CBC AUTOMATED: CPT | Performed by: INTERNAL MEDICINE

## 2025-05-02 PROCEDURE — 99215 OFFICE O/P EST HI 40 MIN: CPT

## 2025-05-02 PROCEDURE — 83735 ASSAY OF MAGNESIUM: CPT | Performed by: INTERNAL MEDICINE

## 2025-05-02 PROCEDURE — 80197 ASSAY OF TACROLIMUS: CPT | Performed by: INTERNAL MEDICINE

## 2025-05-02 PROCEDURE — 3078F DIAST BP <80 MM HG: CPT | Performed by: STUDENT IN AN ORGANIZED HEALTH CARE EDUCATION/TRAINING PROGRAM

## 2025-05-02 PROCEDURE — 80197 ASSAY OF TACROLIMUS: CPT | Performed by: STUDENT IN AN ORGANIZED HEALTH CARE EDUCATION/TRAINING PROGRAM

## 2025-05-02 PROCEDURE — 36415 COLL VENOUS BLD VENIPUNCTURE: CPT | Performed by: INTERNAL MEDICINE

## 2025-05-02 PROCEDURE — 83735 ASSAY OF MAGNESIUM: CPT | Performed by: STUDENT IN AN ORGANIZED HEALTH CARE EDUCATION/TRAINING PROGRAM

## 2025-05-02 RX ORDER — ACYCLOVIR 200 MG/1
400 CAPSULE ORAL 2 TIMES DAILY
Qty: 120 CAPSULE | Refills: 2 | Status: SHIPPED | OUTPATIENT
Start: 2025-05-02 | End: 2025-07-31

## 2025-05-02 RX ORDER — ONDANSETRON HYDROCHLORIDE 2 MG/ML
4 INJECTION, SOLUTION INTRAVENOUS
OUTPATIENT
Start: 2025-05-05

## 2025-05-02 RX ORDER — LIDOCAINE AND PRILOCAINE 25; 25 MG/G; MG/G
1 CREAM TOPICAL AS NEEDED
OUTPATIENT
Start: 2025-05-05

## 2025-05-02 RX ORDER — NAPROXEN SODIUM 220 MG/1
81 TABLET, FILM COATED ORAL DAILY
Qty: 90 TABLET | Refills: 3 | Status: SHIPPED | OUTPATIENT
Start: 2025-05-02 | End: 2026-05-02

## 2025-05-02 RX ORDER — DIPHENHYDRAMINE HCL 25 MG
25 CAPSULE ORAL
OUTPATIENT
Start: 2025-05-05

## 2025-05-02 RX ORDER — SULFAMETHOXAZOLE AND TRIMETHOPRIM 400; 80 MG/1; MG/1
1 TABLET ORAL DAILY
Qty: 30 TABLET | Refills: 5 | Status: SHIPPED | OUTPATIENT
Start: 2025-05-02 | End: 2025-10-29

## 2025-05-02 RX ORDER — ACETAMINOPHEN 325 MG/1
650 TABLET ORAL EVERY 4 HOURS PRN
OUTPATIENT
Start: 2025-05-05

## 2025-05-02 RX ORDER — PREDNISONE 5 MG/1
15 TABLET ORAL DAILY
Qty: 90 TABLET | Refills: 11 | Status: SHIPPED | OUTPATIENT
Start: 2025-05-02 | End: 2026-05-02

## 2025-05-02 RX ORDER — CLOTRIMAZOLE 10 MG/1
10 LOZENGE ORAL
Qty: 90 TROCHE | Refills: 1 | Status: SHIPPED | OUTPATIENT
Start: 2025-05-02 | End: 2025-07-31

## 2025-05-02 RX ORDER — CARVEDILOL 12.5 MG/1
12.5 TABLET ORAL 2 TIMES DAILY
Qty: 60 TABLET | Refills: 11 | Status: SHIPPED | OUTPATIENT
Start: 2025-05-02 | End: 2026-05-02

## 2025-05-02 RX ORDER — MYCOPHENOLATE MOFETIL 250 MG/1
1000 CAPSULE ORAL 2 TIMES DAILY
Qty: 240 CAPSULE | Refills: 11 | Status: SHIPPED | OUTPATIENT
Start: 2025-05-02 | End: 2026-05-02

## 2025-05-02 RX ORDER — CHOLECALCIFEROL (VITAMIN D3) 25 MCG
25 TABLET ORAL DAILY
Qty: 90 TABLET | Refills: 3 | Status: SHIPPED | OUTPATIENT
Start: 2025-05-02 | End: 2026-05-02

## 2025-05-02 RX ORDER — AMLODIPINE BESYLATE 10 MG/1
10 TABLET ORAL DAILY
Qty: 30 TABLET | Refills: 11 | Status: SHIPPED | OUTPATIENT
Start: 2025-05-02 | End: 2026-05-02

## 2025-05-02 RX ADMIN — DARBEPOETIN ALFA 200 MCG: 200 INJECTION, SOLUTION INTRAVENOUS; SUBCUTANEOUS at 14:50

## 2025-05-02 ASSESSMENT — PAIN SCALES - GENERAL
PAINLEVEL_OUTOF10: 0 - NO PAIN
PAINLEVEL_OUTOF10: 0-NO PAIN

## 2025-05-02 ASSESSMENT — PAIN - FUNCTIONAL ASSESSMENT: PAIN_FUNCTIONAL_ASSESSMENT: NO/DENIES PAIN

## 2025-05-02 NOTE — PROGRESS NOTES
Tanmay Mcneill is a 39 y.o. male on day 0 of admission presenting for dialysis in the setting of delayed graft function post DDKT 4/19/25    Subjective   Doing well. No complaints. UOP ~ 1L/day. Saw Dr. Ontiveros in clinic earlier and TAC was adjusted.       Objective     Physical Exam  Aox3, NAD  Unlabored breathing  No edema  Obese      Last Recorded Vitals  Blood pressure 145/63, pulse 63, temperature 36.2 °C (97.2 °F), temperature source Temporal.  Intake/Output last 3 Shifts:  No intake/output data recorded.    Relevant Results  .No results found for this or any previous visit (from the past 24 hours).    Results for orders placed or performed during the hospital encounter of 04/30/25 (from the past 96 hours)   Prepare RBC: 2 Units, Irradiated   Result Value Ref Range    PRODUCT CODE G1491W08     Unit Number [id number]     Unit ABO O     Unit RH POS     XM INTEP COMP     Dispense Status TR     Blood Expiration Date 5/28/2025 11:59:00 PM EDT     PRODUCT BLOOD TYPE 5100     UNIT VOLUME 281     PRODUCT CODE L9195W19     Unit Number [id number]     Unit ABO O     Unit RH POS     XM INTEP COMP     Dispense Status PT     Blood Expiration Date 5/28/2025 11:59:00 PM EDT     PRODUCT BLOOD TYPE 5100     UNIT VOLUME 280                 Assessment & Plan  Delayed graft function of kidney ([facility])    Labs reviewed.  Continue HD MWF for now.  TAC already adjusted by surgery.    Hb improved. Hold off on blood transfusion.  Add darbe 200 mcg IV weekly    I spent 30 minutes in the professional and overall care of this patient.      Jacquie Jackson MD

## 2025-05-02 NOTE — PROGRESS NOTES
Subjective   Tanmay Mcneill is a 39 y.o. male who underwent DDKT on 4/19/2025 (Kidney). Here today for follow-up.    Doing well. Some RLQ edema.   NO lower extremity edema.   Increasing UOP +1L daily    Review of Systems     Objective   /64   Pulse 74   Temp 36.4 °C (97.6 °F) (Temporal)   Wt (!) 161 kg (355 lb)   SpO2 96%   BMI 46.84 kg/m²   Exam  Gen: AAOx3, NAD  Card: Regular rate and rhythm  Resp: sat well room air, equal chest rise  Abd: incision c/d/I, no erythema. + soft edema RLQ  Ext: no lower extremity edema  Lab Results   Component Value Date    CREATININE 12.08 (H) 04/30/2025    K 3.8 04/30/2025    GLUCOSE 114 (H) 04/30/2025    HCT 24.6 (L) 04/30/2025    WBC 8.0 04/30/2025     04/30/2025    CALCIUM 7.5 (L) 04/30/2025     Assessment/Plan      ESRD 2/2 DM and HTN     S/p DDKT 4/19/25   DCD donor, KDPI 48%  Full cava used as venous extension 2/2 depth  DGF, HD  M,W,F   Lasix 80 PO BID  EPO on HD  Stop bicarb  Stent out 5/13 - had false passage + difficult hyatt / asked him to notify urology      Immunosuppression   PRA 19%  Goal thymo 4.5mg/kg - completed  Tacrolimus - cont 5/5, last level 9.8 continue  MMF 1 g BID  Prednisone to 15 mg today     3. Diabetes  Appreciate endocrine  Sugars well controlled at home     4. Ppx  CMV: D-/R+: Acyclovir and prevymis (cost)  EBV: D+/R+  Bactrim    5. HTN  Amlodipine 10 mg  Coreg  25 mg     Follow up 1 week  Labs Three times weekly w/ HD     Donor Kidney Info:  Etiology:  DCD  Risk: no but was lung tx recipient  Terminal Cr  1.08   KDPI: 48 %  PRA: 19 %  CMV: D-/R+  EBV: D+/R+  Toxo: Donor Negative  HCV Ab/MELISSA: Negative  HBcAB: Negative  HBsAg/HBV MELISSA: Negative

## 2025-05-05 ENCOUNTER — HOSPITAL ENCOUNTER (OUTPATIENT)
Dept: DIALYSIS | Facility: HOSPITAL | Age: 40
Setting detail: DIALYSIS SERIES
Discharge: HOME | End: 2025-05-05
Payer: COMMERCIAL

## 2025-05-05 ENCOUNTER — PATIENT MESSAGE (OUTPATIENT)
Facility: HOSPITAL | Age: 40
End: 2025-05-05
Payer: COMMERCIAL

## 2025-05-05 VITALS — DIASTOLIC BLOOD PRESSURE: 51 MMHG | TEMPERATURE: 97 F | SYSTOLIC BLOOD PRESSURE: 139 MMHG | HEART RATE: 62 BPM

## 2025-05-05 DIAGNOSIS — T86.19 DELAYED GRAFT FUNCTION OF KIDNEY (HHS-HCC): Primary | ICD-10-CM

## 2025-05-05 DIAGNOSIS — Z94.0 KIDNEY REPLACED BY TRANSPLANT (HHS-HCC): ICD-10-CM

## 2025-05-05 DIAGNOSIS — R79.89 LOW SERUM CALCIUM: ICD-10-CM

## 2025-05-05 LAB
ALBUMIN SERPL BCP-MCNC: 3.5 G/DL (ref 3.4–5)
ANION GAP SERPL CALC-SCNC: 18 MMOL/L (ref 10–20)
BUN SERPL-MCNC: 55 MG/DL (ref 6–23)
CALCIUM SERPL-MCNC: 7.5 MG/DL (ref 8.6–10.6)
CHLORIDE SERPL-SCNC: 93 MMOL/L (ref 98–107)
CO2 SERPL-SCNC: 27 MMOL/L (ref 21–32)
CREAT SERPL-MCNC: 10.97 MG/DL (ref 0.5–1.3)
EGFRCR SERPLBLD CKD-EPI 2021: 5 ML/MIN/1.73M*2
ERYTHROCYTE [DISTWIDTH] IN BLOOD BY AUTOMATED COUNT: 17.9 % (ref 11.5–14.5)
GLUCOSE SERPL-MCNC: 136 MG/DL (ref 74–99)
HCT VFR BLD AUTO: 24.4 % (ref 41–52)
HGB BLD-MCNC: 7.4 G/DL (ref 13.5–17.5)
MAGNESIUM SERPL-MCNC: 1.91 MG/DL (ref 1.6–2.4)
MCH RBC QN AUTO: 24.4 PG (ref 26–34)
MCHC RBC AUTO-ENTMCNC: 30.3 G/DL (ref 32–36)
MCV RBC AUTO: 81 FL (ref 80–100)
NRBC BLD-RTO: 0 /100 WBCS (ref 0–0)
PHOSPHATE SERPL-MCNC: 5.1 MG/DL (ref 2.5–4.9)
PLATELET # BLD AUTO: 226 X10*3/UL (ref 150–450)
POTASSIUM SERPL-SCNC: 3.9 MMOL/L (ref 3.5–5.3)
RBC # BLD AUTO: 3.03 X10*6/UL (ref 4.5–5.9)
SODIUM SERPL-SCNC: 134 MMOL/L (ref 136–145)
TACROLIMUS BLD-MCNC: 9.3 NG/ML
WBC # BLD AUTO: 12.4 X10*3/UL (ref 4.4–11.3)

## 2025-05-05 PROCEDURE — 36415 COLL VENOUS BLD VENIPUNCTURE: CPT | Mod: V7 | Performed by: INTERNAL MEDICINE

## 2025-05-05 PROCEDURE — 90937 HEMODIALYSIS REPEATED EVAL: CPT | Mod: V7

## 2025-05-05 PROCEDURE — 80197 ASSAY OF TACROLIMUS: CPT | Mod: V7 | Performed by: INTERNAL MEDICINE

## 2025-05-05 PROCEDURE — 2500000004 HC RX 250 GENERAL PHARMACY W/ HCPCS (ALT 636 FOR OP/ED): Mod: JZ,TB

## 2025-05-05 PROCEDURE — 85027 COMPLETE CBC AUTOMATED: CPT | Mod: V7 | Performed by: INTERNAL MEDICINE

## 2025-05-05 PROCEDURE — 80069 RENAL FUNCTION PANEL: CPT | Mod: V7 | Performed by: INTERNAL MEDICINE

## 2025-05-05 PROCEDURE — 83735 ASSAY OF MAGNESIUM: CPT | Mod: V7 | Performed by: INTERNAL MEDICINE

## 2025-05-05 RX ORDER — DIPHENHYDRAMINE HCL 25 MG
25 CAPSULE ORAL
OUTPATIENT
Start: 2025-05-09

## 2025-05-05 RX ORDER — LIDOCAINE AND PRILOCAINE 25; 25 MG/G; MG/G
1 CREAM TOPICAL AS NEEDED
OUTPATIENT
Start: 2025-05-09

## 2025-05-05 RX ORDER — TACROLIMUS 1 MG/1
4 CAPSULE ORAL 2 TIMES DAILY
Qty: 240 CAPSULE | Refills: 0 | Status: SHIPPED | OUTPATIENT
Start: 2025-05-05 | End: 2025-05-09 | Stop reason: SDUPTHER

## 2025-05-05 RX ORDER — ONDANSETRON HYDROCHLORIDE 2 MG/ML
4 INJECTION, SOLUTION INTRAVENOUS
OUTPATIENT
Start: 2025-05-09

## 2025-05-05 RX ORDER — ACETAMINOPHEN 325 MG/1
650 TABLET ORAL EVERY 4 HOURS PRN
OUTPATIENT
Start: 2025-05-09

## 2025-05-05 RX ADMIN — DARBEPOETIN ALFA 100 MCG: 100 INJECTION, SOLUTION INTRAVENOUS; SUBCUTANEOUS at 13:34

## 2025-05-05 ASSESSMENT — PAIN - FUNCTIONAL ASSESSMENT: PAIN_FUNCTIONAL_ASSESSMENT: NO/DENIES PAIN

## 2025-05-05 ASSESSMENT — PAIN SCALES - GENERAL: PAINLEVEL_OUTOF10: 0 - NO PAIN

## 2025-05-05 NOTE — TELEPHONE ENCOUNTER
Ordered CT abd/pelvis non contrast for c/o fluid collection per Dr. Velasco. Tasked Bryant/Rosita to scheduled.  Called patient who verb understanding.

## 2025-05-05 NOTE — TELEPHONE ENCOUNTER
Patient called requesting to speak with coordinator about letter of eta when he will be out of work .  Patient call back number is [phone]

## 2025-05-06 ENCOUNTER — TELEPHONE (OUTPATIENT)
Facility: HOSPITAL | Age: 40
End: 2025-05-06
Payer: COMMERCIAL

## 2025-05-06 ENCOUNTER — PATIENT MESSAGE (OUTPATIENT)
Facility: HOSPITAL | Age: 40
End: 2025-05-06
Payer: COMMERCIAL

## 2025-05-06 NOTE — TELEPHONE ENCOUNTER
Patient called requesting to speak with coordinator about swelling in his right leg.  Patient call back number is [phone] .

## 2025-05-07 ENCOUNTER — APPOINTMENT (OUTPATIENT)
Dept: RADIOLOGY | Facility: HOSPITAL | Age: 40
End: 2025-05-07
Payer: COMMERCIAL

## 2025-05-07 ENCOUNTER — HOSPITAL ENCOUNTER (OUTPATIENT)
Dept: DIALYSIS | Facility: HOSPITAL | Age: 40
Setting detail: DIALYSIS SERIES
Discharge: HOME | End: 2025-05-07
Payer: COMMERCIAL

## 2025-05-07 ENCOUNTER — TELEPHONE (OUTPATIENT)
Facility: HOSPITAL | Age: 40
End: 2025-05-07
Payer: COMMERCIAL

## 2025-05-07 VITALS — HEART RATE: 61 BPM | DIASTOLIC BLOOD PRESSURE: 60 MMHG | SYSTOLIC BLOOD PRESSURE: 160 MMHG | TEMPERATURE: 97.2 F

## 2025-05-07 DIAGNOSIS — Z94.0 KIDNEY REPLACED BY TRANSPLANT (HHS-HCC): ICD-10-CM

## 2025-05-07 DIAGNOSIS — T86.19 DELAYED GRAFT FUNCTION OF KIDNEY (HHS-HCC): Primary | ICD-10-CM

## 2025-05-07 LAB
ALBUMIN SERPL BCP-MCNC: 3.4 G/DL (ref 3.4–5)
ANION GAP SERPL CALC-SCNC: 17 MMOL/L (ref 10–20)
BUN SERPL-MCNC: 47 MG/DL (ref 6–23)
CALCIUM SERPL-MCNC: 7.5 MG/DL (ref 8.6–10.6)
CHLORIDE SERPL-SCNC: 94 MMOL/L (ref 98–107)
CO2 SERPL-SCNC: 26 MMOL/L (ref 21–32)
CREAT SERPL-MCNC: 9.51 MG/DL (ref 0.5–1.3)
EGFRCR SERPLBLD CKD-EPI 2021: 6 ML/MIN/1.73M*2
ERYTHROCYTE [DISTWIDTH] IN BLOOD BY AUTOMATED COUNT: 17.9 % (ref 11.5–14.5)
GLUCOSE SERPL-MCNC: 286 MG/DL (ref 74–99)
HCT VFR BLD AUTO: 24.4 % (ref 41–52)
HGB BLD-MCNC: 7.3 G/DL (ref 13.5–17.5)
MAGNESIUM SERPL-MCNC: 1.77 MG/DL (ref 1.6–2.4)
MCH RBC QN AUTO: 24.4 PG (ref 26–34)
MCHC RBC AUTO-ENTMCNC: 29.9 G/DL (ref 32–36)
MCV RBC AUTO: 82 FL (ref 80–100)
NRBC BLD-RTO: 0 /100 WBCS (ref 0–0)
PHOSPHATE SERPL-MCNC: 5.5 MG/DL (ref 2.5–4.9)
PLATELET # BLD AUTO: 192 X10*3/UL (ref 150–450)
POTASSIUM SERPL-SCNC: 4.2 MMOL/L (ref 3.5–5.3)
RBC # BLD AUTO: 2.99 X10*6/UL (ref 4.5–5.9)
SODIUM SERPL-SCNC: 133 MMOL/L (ref 136–145)
TACROLIMUS BLD-MCNC: 7.3 NG/ML
WBC # BLD AUTO: 8.8 X10*3/UL (ref 4.4–11.3)

## 2025-05-07 PROCEDURE — 80197 ASSAY OF TACROLIMUS: CPT | Mod: V7 | Performed by: INTERNAL MEDICINE

## 2025-05-07 PROCEDURE — 90937 HEMODIALYSIS REPEATED EVAL: CPT | Mod: V7

## 2025-05-07 PROCEDURE — 83735 ASSAY OF MAGNESIUM: CPT | Mod: V7 | Performed by: INTERNAL MEDICINE

## 2025-05-07 PROCEDURE — 80069 RENAL FUNCTION PANEL: CPT | Mod: V7 | Performed by: INTERNAL MEDICINE

## 2025-05-07 PROCEDURE — 36415 COLL VENOUS BLD VENIPUNCTURE: CPT | Mod: V7 | Performed by: INTERNAL MEDICINE

## 2025-05-07 PROCEDURE — 85027 COMPLETE CBC AUTOMATED: CPT | Mod: V7 | Performed by: INTERNAL MEDICINE

## 2025-05-07 RX ORDER — LIDOCAINE AND PRILOCAINE 25; 25 MG/G; MG/G
1 CREAM TOPICAL AS NEEDED
OUTPATIENT
Start: 2025-05-09

## 2025-05-07 RX ORDER — ONDANSETRON HYDROCHLORIDE 2 MG/ML
4 INJECTION, SOLUTION INTRAVENOUS
OUTPATIENT
Start: 2025-05-09

## 2025-05-07 RX ORDER — HEPARIN SODIUM 10000 [USP'U]/ML
8000 INJECTION, SOLUTION INTRAVENOUS; SUBCUTANEOUS ONCE
OUTPATIENT
Start: 2025-05-07 | End: 2025-05-07

## 2025-05-07 RX ORDER — HEPARIN SODIUM 5000 [USP'U]/ML
4000 INJECTION, SOLUTION INTRAVENOUS; SUBCUTANEOUS ONCE
Status: CANCELLED | OUTPATIENT
Start: 2025-05-07 | End: 2025-05-07

## 2025-05-07 RX ORDER — HEPARIN SODIUM 10000 [USP'U]/ML
8000 INJECTION, SOLUTION INTRAVENOUS; SUBCUTANEOUS ONCE
OUTPATIENT
Start: 2025-05-09 | End: 2025-05-09

## 2025-05-07 RX ORDER — HEPARIN SODIUM 5000 [USP'U]/ML
4000 INJECTION, SOLUTION INTRAVENOUS; SUBCUTANEOUS ONCE
Status: CANCELLED | OUTPATIENT
Start: 2025-05-09 | End: 2025-05-09

## 2025-05-07 RX ORDER — ACETAMINOPHEN 325 MG/1
650 TABLET ORAL EVERY 4 HOURS PRN
OUTPATIENT
Start: 2025-05-09

## 2025-05-07 RX ORDER — DIPHENHYDRAMINE HCL 25 MG
25 CAPSULE ORAL
OUTPATIENT
Start: 2025-05-09

## 2025-05-07 ASSESSMENT — PAIN - FUNCTIONAL ASSESSMENT: PAIN_FUNCTIONAL_ASSESSMENT: 0-10

## 2025-05-07 ASSESSMENT — PAIN SCALES - GENERAL: PAINLEVEL_OUTOF10: 0 - NO PAIN

## 2025-05-07 NOTE — TELEPHONE ENCOUNTER
Returned call to patient, informed him to come to clinic before CT scan. Dr Ontiveros also recommending a DUS of kidney. Pt updated and ok with POC, he prefers to get it done Friday when he's here. Task sent to sec to schedule.

## 2025-05-08 ENCOUNTER — APPOINTMENT (OUTPATIENT)
Dept: RADIOLOGY | Facility: HOSPITAL | Age: 40
End: 2025-05-08
Payer: COMMERCIAL

## 2025-05-09 ENCOUNTER — OFFICE VISIT (OUTPATIENT)
Facility: HOSPITAL | Age: 40
End: 2025-05-09
Payer: COMMERCIAL

## 2025-05-09 ENCOUNTER — APPOINTMENT (OUTPATIENT)
Dept: DIALYSIS | Facility: HOSPITAL | Age: 40
End: 2025-05-09
Payer: COMMERCIAL

## 2025-05-09 ENCOUNTER — HOSPITAL ENCOUNTER (OUTPATIENT)
Dept: RADIOLOGY | Facility: HOSPITAL | Age: 40
Discharge: HOME | End: 2025-05-09
Payer: COMMERCIAL

## 2025-05-09 ENCOUNTER — TELEPHONE (OUTPATIENT)
Facility: HOSPITAL | Age: 40
End: 2025-05-09

## 2025-05-09 ENCOUNTER — NURSE ONLY (OUTPATIENT)
Facility: HOSPITAL | Age: 40
End: 2025-05-09
Payer: COMMERCIAL

## 2025-05-09 VITALS
WEIGHT: 315 LBS | HEART RATE: 75 BPM | OXYGEN SATURATION: 98 % | BODY MASS INDEX: 46.49 KG/M2 | TEMPERATURE: 97.5 F | SYSTOLIC BLOOD PRESSURE: 177 MMHG | DIASTOLIC BLOOD PRESSURE: 80 MMHG

## 2025-05-09 DIAGNOSIS — Z94.0 KIDNEY REPLACED BY TRANSPLANT (HHS-HCC): ICD-10-CM

## 2025-05-09 DIAGNOSIS — Z94.9 TRANSPLANT: ICD-10-CM

## 2025-05-09 DIAGNOSIS — T81.49XA SURGICAL WOUND INFECTION: ICD-10-CM

## 2025-05-09 LAB
ALBUMIN SERPL BCP-MCNC: 3.8 G/DL (ref 3.4–5)
ANION GAP SERPL CALC-SCNC: 18 MMOL/L (ref 10–20)
BUN SERPL-MCNC: 40 MG/DL (ref 6–23)
CALCIUM SERPL-MCNC: 7.8 MG/DL (ref 8.6–10.6)
CHLORIDE SERPL-SCNC: 97 MMOL/L (ref 98–107)
CO2 SERPL-SCNC: 25 MMOL/L (ref 21–32)
CREAT FLD-MCNC: 8.21 MG/DL
CREAT SERPL-MCNC: 8.37 MG/DL (ref 0.5–1.3)
EGFRCR SERPLBLD CKD-EPI 2021: 8 ML/MIN/1.73M*2
ERYTHROCYTE [DISTWIDTH] IN BLOOD BY AUTOMATED COUNT: 18.2 % (ref 11.5–14.5)
GLUCOSE SERPL-MCNC: 252 MG/DL (ref 74–99)
HCT VFR BLD AUTO: 27 % (ref 41–52)
HGB BLD-MCNC: 8 G/DL (ref 13.5–17.5)
MAGNESIUM SERPL-MCNC: 1.75 MG/DL (ref 1.6–2.4)
MCH RBC QN AUTO: 25 PG (ref 26–34)
MCHC RBC AUTO-ENTMCNC: 29.6 G/DL (ref 32–36)
MCV RBC AUTO: 84 FL (ref 80–100)
NRBC BLD-RTO: 0 /100 WBCS (ref 0–0)
PHOSPHATE SERPL-MCNC: 4.4 MG/DL (ref 2.5–4.9)
PLATELET # BLD AUTO: 201 X10*3/UL (ref 150–450)
POTASSIUM SERPL-SCNC: 4 MMOL/L (ref 3.5–5.3)
RBC # BLD AUTO: 3.2 X10*6/UL (ref 4.5–5.9)
SODIUM SERPL-SCNC: 136 MMOL/L (ref 136–145)
TACROLIMUS BLD-MCNC: 6 NG/ML
WBC # BLD AUTO: 9.9 X10*3/UL (ref 4.4–11.3)

## 2025-05-09 PROCEDURE — 99214 OFFICE O/P EST MOD 30 MIN: CPT | Mod: 24 | Performed by: STUDENT IN AN ORGANIZED HEALTH CARE EDUCATION/TRAINING PROGRAM

## 2025-05-09 PROCEDURE — 80197 ASSAY OF TACROLIMUS: CPT

## 2025-05-09 PROCEDURE — 82570 ASSAY OF URINE CREATININE: CPT | Performed by: STUDENT IN AN ORGANIZED HEALTH CARE EDUCATION/TRAINING PROGRAM

## 2025-05-09 PROCEDURE — 3079F DIAST BP 80-89 MM HG: CPT | Performed by: STUDENT IN AN ORGANIZED HEALTH CARE EDUCATION/TRAINING PROGRAM

## 2025-05-09 PROCEDURE — 99214 OFFICE O/P EST MOD 30 MIN: CPT | Performed by: STUDENT IN AN ORGANIZED HEALTH CARE EDUCATION/TRAINING PROGRAM

## 2025-05-09 PROCEDURE — 85027 COMPLETE CBC AUTOMATED: CPT

## 2025-05-09 PROCEDURE — 84100 ASSAY OF PHOSPHORUS: CPT

## 2025-05-09 PROCEDURE — 3077F SYST BP >= 140 MM HG: CPT | Performed by: STUDENT IN AN ORGANIZED HEALTH CARE EDUCATION/TRAINING PROGRAM

## 2025-05-09 PROCEDURE — 74176 CT ABD & PELVIS W/O CONTRAST: CPT

## 2025-05-09 PROCEDURE — 83735 ASSAY OF MAGNESIUM: CPT

## 2025-05-09 PROCEDURE — 76776 US EXAM K TRANSPL W/DOPPLER: CPT

## 2025-05-09 PROCEDURE — 36415 COLL VENOUS BLD VENIPUNCTURE: CPT

## 2025-05-09 RX ORDER — TACROLIMUS 1 MG/1
4 CAPSULE ORAL 2 TIMES DAILY
Qty: 240 CAPSULE | Refills: 11 | Status: SHIPPED | OUTPATIENT
Start: 2025-05-09 | End: 2025-05-09 | Stop reason: SDUPTHER

## 2025-05-09 RX ORDER — PREDNISONE 5 MG/1
10 TABLET ORAL DAILY
Qty: 60 TABLET | Refills: 11 | Status: SHIPPED | OUTPATIENT
Start: 2025-05-09 | End: 2026-05-09

## 2025-05-09 RX ORDER — TACROLIMUS 1 MG/1
5 CAPSULE ORAL 2 TIMES DAILY
Qty: 300 CAPSULE | Refills: 11 | Status: SHIPPED | OUTPATIENT
Start: 2025-05-09 | End: 2026-05-09

## 2025-05-09 RX ORDER — AMOXICILLIN AND CLAVULANATE POTASSIUM 500; 125 MG/1; MG/1
1 TABLET, FILM COATED ORAL 2 TIMES DAILY
Qty: 20 TABLET | Refills: 0 | Status: SHIPPED | OUTPATIENT
Start: 2025-05-09 | End: 2025-05-19

## 2025-05-09 ASSESSMENT — PAIN SCALES - GENERAL: PAINLEVEL_OUTOF10: 0-NO PAIN

## 2025-05-09 NOTE — TELEPHONE ENCOUNTER
Call placed to patient on behalf of primary coordinator Sana  Per Dr. Melgar no HD today  Labs Monday    Once Dr. Ontiveros reviews CT scan and US coordinator Sana will call patient.     Patient did not answer, LVM with above plan.

## 2025-05-09 NOTE — TELEPHONE ENCOUNTER
Imaging reviewed by Dr Ontiveros - no intervention at this time, but start on augmentin x10 days. Confirmed dosing with Mickie Cleary. Rx sent to Dr Ontivreos for signature.

## 2025-05-09 NOTE — PATIENT INSTRUCTIONS
Medication Changes:  Decrease prednisone to 10mg (2 tabs) every morning    Plan:  CT and ultrasound today to further evaluate your swelling  We'll let you know about dialysis as soon as we see your labs  Labs Mon Wed Fri to determine dialysis need  Urology appt Tues 5/13 at 8:15am for stent removal  Next clinic appt in 1 week 5/16 at 8:00am as scheduled

## 2025-05-09 NOTE — TELEPHONE ENCOUNTER
Per Dr Ontiveros - increase tac 5 mg BID    Call placed to patient to discuss tac increase and to start Augmentin, no answer. LMTCB or look at [email or web address] message. Message sent.

## 2025-05-09 NOTE — PROGRESS NOTES
Subjective   Tanmay Mcneill is a 39 y.o. male who underwent DDKT on 4/19/2025 (Kidney). Here today for follow-up.    Doing well. Some RLQ edema.   NO lower extremity edema.   Increasing UOP +1L daily    Review of Systems     Objective   /80   Pulse 75   Temp 36.4 °C (97.5 °F) (Temporal)   Wt (!) 160 kg (352 lb 6.4 oz)   SpO2 98%   BMI 46.49 kg/m²   Exam  Gen: AAOx3, NAD  Card: Regular rate and rhythm  Resp: sat well room air, equal chest rise  Abd: incision c/d/I, no erythema. + soft edema RLQ  Ext: no lower extremity edema  Lab Results   Component Value Date    CREATININE 9.51 (H) 05/07/2025    K 4.2 05/07/2025    GLUCOSE 286 (H) 05/07/2025    HCT 24.4 (L) 05/07/2025    WBC 8.8 05/07/2025     05/07/2025    CALCIUM 7.5 (L) 05/07/2025     Assessment/Plan      ESRD 2/2 DM and HTN     S/p DDKT 4/19/25   DCD donor, KDPI 48%  Full cava used as venous extension 2/2 depth  DGF, HD  M,W,F   Lasix 80 PO BID  Stent out 5/13 - had false passage + difficult hyatt / asked him to notify urology   Drained seroma in clinic - continue staples  CT and US today for increased edema RLE     Immunosuppression   PRA 19%  Goal thymo 4.5mg/kg - completed  Tacrolimus - cont 4/4  MMF 1 g BID  Prednisone to 10 mg today     3. Diabetes  Appreciate endocrine  Sugars well controlled at home     4. Ppx  CMV: D-/R+: Acyclovir and prevymis (cost)  EBV: D+/R+  Bactrim    5. HTN  Amlodipine 10 mg  Coreg  25 mg     Follow up 1 week  Labs Three times weekly w/ HD     Donor Kidney Info:  Etiology:  DCD  Risk: no but was lung tx recipient  Terminal Cr  1.08   KDPI: 48 %  PRA: 19 %  CMV: D-/R+  EBV: D+/R+  Toxo: Donor Negative  HCV Ab/MELISSA: Negative  HBcAB: Negative  HBsAg/HBV MELISSA: Negative

## 2025-05-12 ENCOUNTER — NURSE ONLY (OUTPATIENT)
Facility: HOSPITAL | Age: 40
End: 2025-05-12
Payer: COMMERCIAL

## 2025-05-12 ENCOUNTER — TELEPHONE (OUTPATIENT)
Facility: HOSPITAL | Age: 40
End: 2025-05-12
Payer: COMMERCIAL

## 2025-05-12 ENCOUNTER — HOSPITAL ENCOUNTER (OUTPATIENT)
Dept: DIALYSIS | Facility: HOSPITAL | Age: 40
Setting detail: DIALYSIS SERIES
Discharge: HOME | End: 2025-05-12
Payer: COMMERCIAL

## 2025-05-12 VITALS — SYSTOLIC BLOOD PRESSURE: 180 MMHG | TEMPERATURE: 97.9 F | DIASTOLIC BLOOD PRESSURE: 69 MMHG | HEART RATE: 62 BPM

## 2025-05-12 DIAGNOSIS — T86.19 DELAYED GRAFT FUNCTION OF KIDNEY (HHS-HCC): Primary | ICD-10-CM

## 2025-05-12 DIAGNOSIS — Z94.0 KIDNEY REPLACED BY TRANSPLANT (HHS-HCC): ICD-10-CM

## 2025-05-12 LAB
ALBUMIN SERPL BCP-MCNC: 3.9 G/DL (ref 3.4–5)
ANION GAP SERPL CALC-SCNC: 18 MMOL/L (ref 10–20)
BUN SERPL-MCNC: 51 MG/DL (ref 6–23)
CALCIUM SERPL-MCNC: 7.9 MG/DL (ref 8.6–10.6)
CHLORIDE SERPL-SCNC: 101 MMOL/L (ref 98–107)
CO2 SERPL-SCNC: 22 MMOL/L (ref 21–32)
CREAT SERPL-MCNC: 9.06 MG/DL (ref 0.5–1.3)
EGFRCR SERPLBLD CKD-EPI 2021: 7 ML/MIN/1.73M*2
ERYTHROCYTE [DISTWIDTH] IN BLOOD BY AUTOMATED COUNT: 18.4 % (ref 11.5–14.5)
GLUCOSE SERPL-MCNC: 204 MG/DL (ref 74–99)
HCT VFR BLD AUTO: 26.2 % (ref 41–52)
HGB BLD-MCNC: 7.6 G/DL (ref 13.5–17.5)
MAGNESIUM SERPL-MCNC: 1.71 MG/DL (ref 1.6–2.4)
MCH RBC QN AUTO: 24.1 PG (ref 26–34)
MCHC RBC AUTO-ENTMCNC: 29 G/DL (ref 32–36)
MCV RBC AUTO: 83 FL (ref 80–100)
NRBC BLD-RTO: 0 /100 WBCS (ref 0–0)
PHOSPHATE SERPL-MCNC: 4.8 MG/DL (ref 2.5–4.9)
PLATELET # BLD AUTO: 200 X10*3/UL (ref 150–450)
POTASSIUM SERPL-SCNC: 4.1 MMOL/L (ref 3.5–5.3)
RBC # BLD AUTO: 3.16 X10*6/UL (ref 4.5–5.9)
SODIUM SERPL-SCNC: 137 MMOL/L (ref 136–145)
TACROLIMUS BLD-MCNC: 7 NG/ML
WBC # BLD AUTO: 6.9 X10*3/UL (ref 4.4–11.3)

## 2025-05-12 PROCEDURE — 2500000004 HC RX 250 GENERAL PHARMACY W/ HCPCS (ALT 636 FOR OP/ED): Mod: JZ,TB | Performed by: INTERNAL MEDICINE

## 2025-05-12 PROCEDURE — 80069 RENAL FUNCTION PANEL: CPT

## 2025-05-12 PROCEDURE — 36415 COLL VENOUS BLD VENIPUNCTURE: CPT

## 2025-05-12 PROCEDURE — 83735 ASSAY OF MAGNESIUM: CPT

## 2025-05-12 PROCEDURE — 85027 COMPLETE CBC AUTOMATED: CPT

## 2025-05-12 PROCEDURE — 90937 HEMODIALYSIS REPEATED EVAL: CPT | Mod: V7

## 2025-05-12 PROCEDURE — 80197 ASSAY OF TACROLIMUS: CPT

## 2025-05-12 RX ORDER — ACETAMINOPHEN 325 MG/1
650 TABLET ORAL EVERY 4 HOURS PRN
OUTPATIENT
Start: 2025-05-16

## 2025-05-12 RX ORDER — HEPARIN SODIUM 10000 [USP'U]/ML
8000 INJECTION, SOLUTION INTRAVENOUS; SUBCUTANEOUS ONCE
Status: CANCELLED | OUTPATIENT
Start: 2025-05-16 | End: 2025-05-16

## 2025-05-12 RX ORDER — ONDANSETRON HYDROCHLORIDE 2 MG/ML
4 INJECTION, SOLUTION INTRAVENOUS
OUTPATIENT
Start: 2025-05-16

## 2025-05-12 RX ORDER — LIDOCAINE AND PRILOCAINE 25; 25 MG/G; MG/G
1 CREAM TOPICAL AS NEEDED
OUTPATIENT
Start: 2025-05-16

## 2025-05-12 RX ORDER — DIPHENHYDRAMINE HCL 25 MG
25 CAPSULE ORAL
OUTPATIENT
Start: 2025-05-16

## 2025-05-12 RX ORDER — HEPARIN SODIUM 10000 [USP'U]/ML
8000 INJECTION, SOLUTION INTRAVENOUS; SUBCUTANEOUS ONCE
Status: DISCONTINUED | OUTPATIENT
Start: 2025-05-12 | End: 2025-05-13 | Stop reason: HOSPADM

## 2025-05-12 RX ADMIN — DARBEPOETIN ALFA 100 MCG: 100 INJECTION, SOLUTION INTRAVENOUS; SUBCUTANEOUS at 19:54

## 2025-05-12 ASSESSMENT — PAIN - FUNCTIONAL ASSESSMENT: PAIN_FUNCTIONAL_ASSESSMENT: NO/DENIES PAIN

## 2025-05-12 ASSESSMENT — PAIN SCALES - PAIN ASSESSMENT IN ADVANCED DEMENTIA (PAINAD): BODYLANGUAGE: NORMAL

## 2025-05-12 NOTE — TELEPHONE ENCOUNTER
Patient called requesting to speak with coordinator about  wants to know does he need to go to dialysis or not.  Patient call back number is [phone]

## 2025-05-12 NOTE — PROGRESS NOTES
Procedure:  After informed consent was obtained, the patient was taken to the procedure room for cystoscopy due to indwelling ureteral stent, here for cystoscopic removal     Cystoscopy / Stetnt removal    Procedure Note:    A sterile prep and drape was performed in standard fashion. Lidocaine was used for topical anesthesia. A flexible cystoscope was inserted into the urethra without difficulty revealing hx of false pass and small bulbar stricture in the  urethra.     Then entered the bladder and the stent was seen emanating from the ureteral orifice. It was grasped and extracted through the urethra. It was inspected and found to be intact.     Post-Procedure:   The cystoscope was removed. The vital signs were stable . The patient tolerated the procedure well. There were no complications.

## 2025-05-12 NOTE — PROGRESS NOTES
"Today's visit:    40 yo NPV male here for stent removal s/p kidney transplant on 4/19/25 with 1 stent in place    Discharge summary , op notes and transplant notes reviewed    Hx of false passage- which required hyatt placement with cystoscopy     Patient is on Augmentin    CT, abd pelvis, 5/9/25 - reviewed/interpreted   1. Postoperative changes of the ventral abdominal wall with scattered  foci of gas surrounding the right lateral abdominal wall which  extends to the peritoneum towards the transplant kidney without  definite evidence of involvement of the kidney. Please correlate with  the same day ultrasound to evaluate kidney function. No abdominal  wall fluid collection.  2. Right iliac fossa transplant kidney with a small fluid collection  adjacent to the right external iliac vessels likely representing a  postoperative seroma.  Labs  Component      Latest Ref Rng 5/12/2025   GLUCOSE      74 - 99 mg/dL 204 (H)    SODIUM      136 - 145 mmol/L 137    POTASSIUM      3.5 - 5.3 mmol/L 4.1    CHLORIDE      98 - 107 mmol/L 101    Bicarbonate      21 - 32 mmol/L 22    Anion Gap      10 - 20 mmol/L 18    Blood Urea Nitrogen      6 - 23 mg/dL 51 (H)    Creatinine      0.50 - 1.30 mg/dL 9.06 (H)    Calcium      8.6 - 10.6 mg/dL 7.9 (L)    PHOSPHORUS      2.5 - 4.9 mg/dL 4.8    Albumin      3.4 - 5.0 g/dL 3.9    EGFR      >60 mL/min/1.73m*2 7 (L)       Lab Results   Component Value Date    HGBA1C 7.8 (H) 02/27/2025     No components found for: \"HEMATOCRIT\"      Medications:  Current Medications[1]    Allergy:  Allergies[2]     Exam  CONSTITUTIONAL:        No acute distress    HEAD:        Normocephalic and atraumatic    CHEST / RESPIRATORY      no excess work of breathing, no respiratory distress,    ABDOMEN / GASTROINTESTINAL:        Abdomen nondistended        Assessment/Plan  40 yo NPV male here for stent removal s/p kidney transplant on 4/19/25 with 1 stent in place. Hx urethral false passage and mild stricture, " traversed with scope today  -Cystoscopy in office: Discussed risks, including discomfort bleeding, infection, damage to adjacent structures, need for further procedures, recurrence, etc  -after informed consent was obtained, stent was removed today without complications   - patient tolerated well  -warning signs reviewed    Scribe Attestation  By signing my name below, I, Joan Conteh , Scribe   attest that this documentation has been prepared under the direction and in the presence of Brooke Greenwood MD.         [1]   Current Outpatient Medications:     acetaminophen (Tylenol) 325 mg tablet, Take 2 tablets (650 mg) by mouth every 6 hours if needed for mild pain (1 - 3). Do not exceed 3000 mg in 24 hours., Disp: 30 tablet, Rfl: 0    acyclovir (Zovirax) 200 mg capsule, Take 2 capsules (400 mg) by mouth 2 times a day., Disp: 120 capsule, Rfl: 2    albuterol (Ventolin HFA) 90 mcg/actuation inhaler, Inhale 2 puffs every 4 hours if needed for wheezing or shortness of breath., Disp: , Rfl:     alcohol swabs (Alcohol Pads), Use 4-8 per day to check blood glucose and for injectable medications, Disp: 400 each, Rfl: 0    amLODIPine (Norvasc) 10 mg tablet, Take 1 tablet (10 mg) by mouth once daily., Disp: 30 tablet, Rfl: 11    amoxicillin-clavulanate (Augmentin) 500-125 mg tablet, Take 1 tablet by mouth 2 times a day for 10 days., Disp: 20 tablet, Rfl: 0    aspirin 81 mg chewable tablet, Chew 1 tablet (81 mg) once daily., Disp: 90 tablet, Rfl: 3    atorvastatin (Lipitor) 80 mg tablet, Take 1 tablet (80 mg) by mouth once daily., Disp: , Rfl:     blood sugar diagnostic (Accu-Chek Celi Plus test strp) strip, 1 strip 4 times a day., Disp: , Rfl:     blood sugar diagnostic (Blood Glucose Test), Check blood glucose 4 time per day, Disp: 100 each, Rfl: 0    blood-glucose meter misc, Use to check blood glucose, Disp: 1 each, Rfl: 0    calcium carbonate-vitamin D3 600 mg-20 mcg (800 unit) tablet,chewable, Chew 600 mg 2  "times a day., Disp: 60 tablet, Rfl: 11    carvedilol (Coreg) 12.5 mg tablet, Take 1 tablet (12.5 mg) by mouth 2 times a day., Disp: 60 tablet, Rfl: 11    cholecalciferol (Vitamin D-3) 25 mcg (1,000 units) tablet, Take 1 tablet (25 mcg) by mouth once daily., Disp: 90 tablet, Rfl: 3    clotrimazole (Mycelex) 10 mg salile, Use 1 tablet (10 mg) in the mouth or throat 3 times a day after meals., Disp: 90 Sallie, Rfl: 1    docusate sodium (Colace) 100 mg capsule, Take 1 capsule (100 mg) by mouth 2 times a day as needed for constipation., Disp: 60 capsule, Rfl: 0    furosemide (Lasix) 40 mg tablet, Take 2 tablets (80 mg) by mouth 2 times daily (morning and late afternoon)., Disp: 120 tablet, Rfl: 0    insulin glargine-yfgn (Semglee,insulin glarg-yfgn,Pen) 100 unit/mL (3 mL) pen, Inject 30 Units under the skin once daily. Take as directed per insulin instructions., Disp: 15 mL, Rfl: 0    insulin lispro (HumaLOG) 100 unit/mL pen, Inject 0-10 Units under the skin 4 times a day before meals. Take 18-18-14 units with meals plus sliding scale., Disp: 15 mL, Rfl: 0    lancets (OneTouch UltraSoft Lancets) misc, 1 Lancet 2 times a day., Disp: , Rfl:     lancets 33 gauge misc, 1 each in the morning, at noon, in the evening, and at bedtime., Disp: 100 each, Rfl: 0    letermovir (Prevymis) 480 mg tablet, Take 1 tablet (480 mg) by mouth once daily., Disp: 28 tablet, Rfl: 2    mycophenolate (Cellcept) 250 mg capsule, Take 4 capsules (1,000 mg) by mouth 2 times a day., Disp: 240 capsule, Rfl: 11    pantoprazole (ProtoNix) 40 mg EC tablet, Take 1 tablet (40 mg) by mouth once daily in the morning. Take before meals. Do not crush, chew, or split., Disp: 30 tablet, Rfl: 0    pen needle, diabetic 31 gauge x 1/4\" needle, , Disp: , Rfl:     pen needle, diabetic 31 gauge x 3/16\" needle, 1 each in the morning, at noon, in the evening, and at bedtime., Disp: 100 each, Rfl: 0    polyethylene glycol (Glycolax, Miralax) 17 gram packet, Take 17 g by " mouth once daily as needed (Constipation)., Disp: 1 packet, Rfl: 0    predniSONE (Deltasone) 5 mg tablet, Take 2 tablets (10 mg) by mouth once daily., Disp: 60 tablet, Rfl: 11    sulfamethoxazole-trimethoprim (Bactrim) 400-80 mg tablet, Take 1 tablet by mouth once daily., Disp: 30 tablet, Rfl: 5    tacrolimus (Prograf) 1 mg capsule, Take 5 capsules (5 mg) by mouth 2 times a day., Disp: 300 capsule, Rfl: 11    [Paused] tirzepatide (Mounjaro) 2.5 mg/0.5 mL pen injector, Inject 2.5 mg under the skin every 7 days., Disp: , Rfl:     zolpidem (Ambien) 5 mg tablet, Take 0.5 tablets (2.5 mg) by mouth as needed at bedtime., Disp: , Rfl:   No current facility-administered medications for this visit.  [2] No Known Allergies

## 2025-05-13 ENCOUNTER — APPOINTMENT (OUTPATIENT)
Dept: UROLOGY | Facility: CLINIC | Age: 40
End: 2025-05-13
Payer: COMMERCIAL

## 2025-05-13 VITALS
BODY MASS INDEX: 42.66 KG/M2 | SYSTOLIC BLOOD PRESSURE: 163 MMHG | DIASTOLIC BLOOD PRESSURE: 74 MMHG | WEIGHT: 315 LBS | TEMPERATURE: 97.7 F | HEIGHT: 72 IN | HEART RATE: 80 BPM

## 2025-05-13 DIAGNOSIS — N18.5 CKD (CHRONIC KIDNEY DISEASE), STAGE V (MULTI): ICD-10-CM

## 2025-05-13 DIAGNOSIS — N35.911 STRICTURE OF URETHRAL MEATUS IN MALE, UNSPECIFIED STRICTURE TYPE: Primary | ICD-10-CM

## 2025-05-13 DIAGNOSIS — Z96.0 URETERAL STENT PRESENT: ICD-10-CM

## 2025-05-13 DIAGNOSIS — Z94.9 TRANSPLANT: ICD-10-CM

## 2025-05-13 LAB
POC APPEARANCE, URINE: CLEAR
POC BILIRUBIN, URINE: NEGATIVE
POC BLOOD, URINE: ABNORMAL
POC COLOR, URINE: YELLOW
POC GLUCOSE, URINE: ABNORMAL MG/DL
POC KETONES, URINE: NEGATIVE MG/DL
POC LEUKOCYTES, URINE: ABNORMAL
POC NITRITE,URINE: NEGATIVE
POC PH, URINE: 8 PH
POC PROTEIN, URINE: ABNORMAL MG/DL
POC SPECIFIC GRAVITY, URINE: 1.01
POC UROBILINOGEN, URINE: 0.2 EU/DL

## 2025-05-13 PROCEDURE — 52310 CYSTOSCOPY AND TREATMENT: CPT | Performed by: UROLOGY

## 2025-05-13 PROCEDURE — 81003 URINALYSIS AUTO W/O SCOPE: CPT | Performed by: UROLOGY

## 2025-05-13 PROCEDURE — 99204 OFFICE O/P NEW MOD 45 MIN: CPT | Performed by: UROLOGY

## 2025-05-13 ASSESSMENT — PAIN SCALES - GENERAL: PAINLEVEL_OUTOF10: 8

## 2025-05-14 ENCOUNTER — APPOINTMENT (OUTPATIENT)
Dept: DIALYSIS | Facility: HOSPITAL | Age: 40
End: 2025-05-14
Payer: COMMERCIAL

## 2025-05-14 ENCOUNTER — TELEPHONE (OUTPATIENT)
Facility: HOSPITAL | Age: 40
End: 2025-05-14

## 2025-05-14 ENCOUNTER — NUTRITION (OUTPATIENT)
Facility: HOSPITAL | Age: 40
End: 2025-05-14
Payer: COMMERCIAL

## 2025-05-14 ENCOUNTER — NURSE ONLY (OUTPATIENT)
Facility: HOSPITAL | Age: 40
End: 2025-05-14
Payer: COMMERCIAL

## 2025-05-14 DIAGNOSIS — Z94.0 KIDNEY REPLACED BY TRANSPLANT (HHS-HCC): ICD-10-CM

## 2025-05-14 DIAGNOSIS — Z94.9 TRANSPLANT: ICD-10-CM

## 2025-05-14 LAB
ALBUMIN SERPL BCP-MCNC: 3.8 G/DL (ref 3.4–5)
ANION GAP SERPL CALC-SCNC: 17 MMOL/L (ref 10–20)
BUN SERPL-MCNC: 33 MG/DL (ref 6–23)
CALCIUM SERPL-MCNC: 7.8 MG/DL (ref 8.6–10.6)
CHLORIDE SERPL-SCNC: 100 MMOL/L (ref 98–107)
CO2 SERPL-SCNC: 25 MMOL/L (ref 21–32)
CREAT SERPL-MCNC: 6.28 MG/DL (ref 0.5–1.3)
EGFRCR SERPLBLD CKD-EPI 2021: 11 ML/MIN/1.73M*2
ERYTHROCYTE [DISTWIDTH] IN BLOOD BY AUTOMATED COUNT: 18.5 % (ref 11.5–14.5)
GLUCOSE SERPL-MCNC: 227 MG/DL (ref 74–99)
HCT VFR BLD AUTO: 25.9 % (ref 41–52)
HGB BLD-MCNC: 7.6 G/DL (ref 13.5–17.5)
MAGNESIUM SERPL-MCNC: 1.74 MG/DL (ref 1.6–2.4)
MCH RBC QN AUTO: 24.4 PG (ref 26–34)
MCHC RBC AUTO-ENTMCNC: 29.3 G/DL (ref 32–36)
MCV RBC AUTO: 83 FL (ref 80–100)
NRBC BLD-RTO: 0 /100 WBCS (ref 0–0)
PHOSPHATE SERPL-MCNC: 3.2 MG/DL (ref 2.5–4.9)
PLATELET # BLD AUTO: 256 X10*3/UL (ref 150–450)
POTASSIUM SERPL-SCNC: 3.8 MMOL/L (ref 3.5–5.3)
RBC # BLD AUTO: 3.11 X10*6/UL (ref 4.5–5.9)
SODIUM SERPL-SCNC: 138 MMOL/L (ref 136–145)
TACROLIMUS BLD-MCNC: 4.6 NG/ML
WBC # BLD AUTO: 6.4 X10*3/UL (ref 4.4–11.3)

## 2025-05-14 PROCEDURE — 80069 RENAL FUNCTION PANEL: CPT

## 2025-05-14 PROCEDURE — 85027 COMPLETE CBC AUTOMATED: CPT

## 2025-05-14 PROCEDURE — 83735 ASSAY OF MAGNESIUM: CPT

## 2025-05-14 PROCEDURE — 80197 ASSAY OF TACROLIMUS: CPT

## 2025-05-14 PROCEDURE — 36415 COLL VENOUS BLD VENIPUNCTURE: CPT

## 2025-05-14 NOTE — TELEPHONE ENCOUNTER
Cannot make out where she was calling from, looking to speak with someone about PT's short term disability claim      [phone]

## 2025-05-14 NOTE — PROGRESS NOTES
Reason for Nutrition Visit:  Pt is a 39 y.o. male referred for food safety education s/p kidney transplant performed 4/19/25.    Transplant Coordinator:Sana Haley RN    Past Medical Hx:  Problem List[1]     Lab Results   Component Value Date    HGBA1C 7.8 (H) 02/27/2025    HGBA1C 7.2 (H) 04/02/2024    HGBA1C 7.6 (H) 06/27/2023     05/12/2025    K 4.1 05/12/2025    PHOS 4.8 05/12/2025     05/12/2025    CO2 22 05/12/2025    BUN 51 (H) 05/12/2025    CREATININE 9.06 (H) 05/12/2025    CALCIUM 7.9 (L) 05/12/2025    ALBUMIN 3.9 05/12/2025    PROT 7.2 04/19/2025    BILITOT 0.4 04/19/2025    ALKPHOS 88 04/19/2025    ALT 15 04/19/2025    AST 14 04/19/2025    GLUCOSE 204 (H) 05/12/2025    CHOL 162 03/12/2018    TRIG 294 (H) 03/12/2018    HDL 27.1 (A) 03/12/2018    BHYDRXBUT 0.14 07/21/2018    CPEPTIDE 12.5 (H) 04/02/2024       Lab Results   Component Value Date    HGB 7.6 (L) 05/12/2025           Food History and Nutrition Assessment:    Appetite: Normal  GI Symptoms : None   Swallowing Difficulty: No problems with swallowing  Allergies: None    24 Diet Recall:  Meal 1: eggs, toast  AM Snack:  Meal 2: chicken, potato, veg  PM Snack:  Meal 3: pork, rice, veg  Late Snack:  Beverages:water   Intake: >75%       Wt Readings from Last 10 Encounters:   05/13/25 (!) 159 kg (351 lb)   05/09/25 (!) 160 kg (352 lb 6.4 oz)   05/02/25 (!) 161 kg (355 lb)   04/26/25 (!) 160 kg (351 lb 10.1 oz)   08/07/24 145 kg (319 lb)   07/30/24 (!) 152 kg (334 lb)   04/02/24 (!) 156 kg (343 lb 14.7 oz)   02/20/24 (!) 159 kg (349 lb 12.8 oz)   02/20/24 (!) 159 kg (349 lb 12.8 oz)   11/02/23 (!) 152 kg (335 lb 8 oz)       Food Preparation: Patient  Grocery Shopping: Patient    Types of Activities: House/Yard Work; hoping to return to work earlier than 12 weeks    Sleep duration/quality : continuous sleep    Supplements: Vitamin D daily, Ca twice daily      Nutrition Focused Physical Exam:    Performed/Deferred: Deferred due to be being  virtual visit    Nutrition Diagnosis:    Diagnosis Status: New  Dx: Food and Nutrition related knowledge deficit RT recent change in health status (kidney transplant 25 days ago)   Additional Nutrition-Related Diagnoses: Increased weight for height compared to established references RT excess energy intake as reported in 24 hour diet recall, physical inactivity AEB Obesity Grade III (BMI = 47.60)    Nutrition Education, Interventions and Goals:    Nutrition Goals : Blood glucose control, increased physical activity as tolerated, smaller more frequent meals, CHO paired with PRO and fiber    Educational Handouts: High Protein Snack Ideas and Food Safety Booklet and Plate Method for DM    Nutrition Monitoring and Evaluation:    Additional Recommendations/Referrals:    Follow up: It was a pleasure speaking with you today! If you would like to meet again at any point in the future, or if you have any questions, please ask your coordinator to put you on my schedule. I would be happy to meet with you! I am available for both in-person and virtual appointments. Many thanks!    05/14/25 at 12:10 PM - ADILENE SCHROEDER RDN, ROSA M          [1]   Patient Active Problem List  Diagnosis    Acid reflux    Arteriovenous graft for hemodialysis in place, primary    Asthma    CKD (chronic kidney disease), stage V (Multi)    Chronic pain of right knee    Dyslipidemia    Hypertension    Morbid obesity (Multi)    Osteoarthritis    Snoring    Type 2 diabetes mellitus    Vitamin D deficiency    Transplant    Preop cardiovascular exam    ESRD (end stage renal disease) (Multi)    Type 2 diabetes mellitus with hyperglycemia, with long-term current use of insulin    Steroid-induced hyperglycemia    Delayed graft function of kidney (HHS-HCC)

## 2025-05-15 ENCOUNTER — APPOINTMENT (OUTPATIENT)
Dept: PHARMACY | Facility: HOSPITAL | Age: 40
End: 2025-05-15
Payer: COMMERCIAL

## 2025-05-15 RX ORDER — TACROLIMUS 1 MG/1
6 CAPSULE ORAL 2 TIMES DAILY
Qty: 360 CAPSULE | Refills: 11 | Status: CANCELLED | OUTPATIENT
Start: 2025-05-15 | End: 2026-05-15

## 2025-05-15 NOTE — TELEPHONE ENCOUNTER
Tac 4.6 on 5/5 - per Dr Velasco, increase to 6/6  Called pt, no answer. Left detailed VM informing him to increase dose, also sent [facility] ([facility])t message.

## 2025-05-16 ENCOUNTER — DOCUMENTATION (OUTPATIENT)
Dept: HOME HEALTH SERVICES | Facility: HOME HEALTH | Age: 40
End: 2025-05-16

## 2025-05-16 ENCOUNTER — TELEPHONE (OUTPATIENT)
Dept: HOME HEALTH SERVICES | Facility: HOME HEALTH | Age: 40
End: 2025-05-16

## 2025-05-16 ENCOUNTER — PHARMACY VISIT (OUTPATIENT)
Dept: PHARMACY | Facility: CLINIC | Age: 40
End: 2025-05-16
Payer: COMMERCIAL

## 2025-05-16 ENCOUNTER — HOME HEALTH ADMISSION (OUTPATIENT)
Dept: HOME HEALTH SERVICES | Facility: HOME HEALTH | Age: 40
End: 2025-05-16
Payer: MEDICARE

## 2025-05-16 ENCOUNTER — APPOINTMENT (OUTPATIENT)
Dept: DIALYSIS | Facility: HOSPITAL | Age: 40
End: 2025-05-16
Payer: COMMERCIAL

## 2025-05-16 ENCOUNTER — HOSPITAL ENCOUNTER (OUTPATIENT)
Dept: DIALYSIS | Facility: HOSPITAL | Age: 40
Setting detail: DIALYSIS SERIES
Discharge: HOME | End: 2025-05-16
Payer: COMMERCIAL

## 2025-05-16 ENCOUNTER — NURSE ONLY (OUTPATIENT)
Facility: HOSPITAL | Age: 40
End: 2025-05-16
Payer: MEDICARE

## 2025-05-16 ENCOUNTER — OFFICE VISIT (OUTPATIENT)
Facility: HOSPITAL | Age: 40
End: 2025-05-16
Payer: MEDICARE

## 2025-05-16 VITALS
DIASTOLIC BLOOD PRESSURE: 97 MMHG | HEART RATE: 76 BPM | SYSTOLIC BLOOD PRESSURE: 169 MMHG | BODY MASS INDEX: 47.33 KG/M2 | WEIGHT: 315 LBS | TEMPERATURE: 97.8 F | OXYGEN SATURATION: 97 %

## 2025-05-16 VITALS — TEMPERATURE: 97.2 F | SYSTOLIC BLOOD PRESSURE: 147 MMHG | HEART RATE: 70 BPM | DIASTOLIC BLOOD PRESSURE: 71 MMHG

## 2025-05-16 DIAGNOSIS — Z94.0 IMMUNOSUPPRESSIVE MANAGEMENT ENCOUNTER FOLLOWING KIDNEY TRANSPLANT: Primary | ICD-10-CM

## 2025-05-16 DIAGNOSIS — T86.19 DELAYED GRAFT FUNCTION OF KIDNEY (HHS-HCC): Primary | ICD-10-CM

## 2025-05-16 DIAGNOSIS — Z94.0 KIDNEY REPLACED BY TRANSPLANT (HHS-HCC): ICD-10-CM

## 2025-05-16 DIAGNOSIS — Z79.899 IMMUNOSUPPRESSIVE MANAGEMENT ENCOUNTER FOLLOWING KIDNEY TRANSPLANT: Primary | ICD-10-CM

## 2025-05-16 DIAGNOSIS — Z94.9 TRANSPLANT: ICD-10-CM

## 2025-05-16 LAB
ALBUMIN SERPL BCP-MCNC: 3.9 G/DL (ref 3.4–5)
ALLOSURE SCORE - KIDNEY: 0.68 %
ANION GAP SERPL CALC-SCNC: 17 MMOL/L (ref 10–20)
APPEARANCE UR: CLEAR
BACTERIA #/AREA URNS AUTO: ABNORMAL /HPF
BILIRUB UR STRIP.AUTO-MCNC: NEGATIVE MG/DL
BUN SERPL-MCNC: 36 MG/DL (ref 6–23)
CALCIUM SERPL-MCNC: 8.4 MG/DL (ref 8.6–10.6)
CAREDX_ORDER_ID: NORMAL
CENTER_ORDER_ID: NORMAL
CHLORIDE SERPL-SCNC: 103 MMOL/L (ref 98–107)
CLIENT SPECIMEN ID - ALLOSURE: NORMAL
CO2 SERPL-SCNC: 24 MMOL/L (ref 21–32)
COLOR UR: ABNORMAL
CREAT SERPL-MCNC: 6.35 MG/DL (ref 0.5–1.3)
DONOR RELATION - ALLOSURE: NORMAL
EGFRCR SERPLBLD CKD-EPI 2021: 11 ML/MIN/1.73M*2
ERYTHROCYTE [DISTWIDTH] IN BLOOD BY AUTOMATED COUNT: 18.7 % (ref 11.5–14.5)
GLUCOSE SERPL-MCNC: 188 MG/DL (ref 74–99)
GLUCOSE UR STRIP.AUTO-MCNC: ABNORMAL MG/DL
HCT VFR BLD AUTO: 28.9 % (ref 41–52)
HGB BLD-MCNC: 8.3 G/DL (ref 13.5–17.5)
HOLD SPECIMEN: NORMAL
KETONES UR STRIP.AUTO-MCNC: NEGATIVE MG/DL
LEUKOCYTE ESTERASE UR QL STRIP.AUTO: ABNORMAL
MAGNESIUM SERPL-MCNC: 1.74 MG/DL (ref 1.6–2.4)
MCH RBC QN AUTO: 24.4 PG (ref 26–34)
MCHC RBC AUTO-ENTMCNC: 28.7 G/DL (ref 32–36)
MCV RBC AUTO: 85 FL (ref 80–100)
MUCOUS THREADS #/AREA URNS AUTO: ABNORMAL /LPF
NITRITE UR QL STRIP.AUTO: NEGATIVE
NOTES - ALLOSURE: NORMAL
NRBC BLD-RTO: 0 /100 WBCS (ref 0–0)
PH UR STRIP.AUTO: 7 [PH]
PHOSPHATE SERPL-MCNC: 4.1 MG/DL (ref 2.5–4.9)
PLATELET # BLD AUTO: 319 X10*3/UL (ref 150–450)
POTASSIUM SERPL-SCNC: 4.1 MMOL/L (ref 3.5–5.3)
PROT UR STRIP.AUTO-MCNC: ABNORMAL MG/DL
RBC # BLD AUTO: 3.4 X10*6/UL (ref 4.5–5.9)
RBC # UR STRIP.AUTO: ABNORMAL MG/DL
RBC #/AREA URNS AUTO: ABNORMAL /HPF
RELATIVE CHANGE VALUE - KIDNEY: NORMAL
SODIUM SERPL-SCNC: 140 MMOL/L (ref 136–145)
SP GR UR STRIP.AUTO: 1.01
SQUAMOUS #/AREA URNS AUTO: ABNORMAL /HPF
TACROLIMUS BLD-MCNC: 5.5 NG/ML
TEST COMMENTS - ALLOSURE: NORMAL
TIME POST TX - ALLOSURE: NORMAL
TRANSPLANTED ORGAN - ALLOSURE: NORMAL
TX DATE - ALLOSURE/ALLOMAP: NORMAL
UROBILINOGEN UR STRIP.AUTO-MCNC: NORMAL MG/DL
WBC # BLD AUTO: 6.5 X10*3/UL (ref 4.4–11.3)
WBC #/AREA URNS AUTO: ABNORMAL /HPF
WP_ORDER_ID: NORMAL

## 2025-05-16 PROCEDURE — 85027 COMPLETE CBC AUTOMATED: CPT

## 2025-05-16 PROCEDURE — 3077F SYST BP >= 140 MM HG: CPT | Performed by: STUDENT IN AN ORGANIZED HEALTH CARE EDUCATION/TRAINING PROGRAM

## 2025-05-16 PROCEDURE — 80197 ASSAY OF TACROLIMUS: CPT

## 2025-05-16 PROCEDURE — 83735 ASSAY OF MAGNESIUM: CPT

## 2025-05-16 PROCEDURE — 2500000004 HC RX 250 GENERAL PHARMACY W/ HCPCS (ALT 636 FOR OP/ED): Mod: JZ | Performed by: INTERNAL MEDICINE

## 2025-05-16 PROCEDURE — 81001 URINALYSIS AUTO W/SCOPE: CPT | Performed by: TRANSPLANT SURGERY

## 2025-05-16 PROCEDURE — 2500000004 HC RX 250 GENERAL PHARMACY W/ HCPCS (ALT 636 FOR OP/ED): Mod: JZ,TB

## 2025-05-16 PROCEDURE — 80069 RENAL FUNCTION PANEL: CPT

## 2025-05-16 PROCEDURE — 99214 OFFICE O/P EST MOD 30 MIN: CPT | Performed by: STUDENT IN AN ORGANIZED HEALTH CARE EDUCATION/TRAINING PROGRAM

## 2025-05-16 PROCEDURE — RXMED WILLOW AMBULATORY MEDICATION CHARGE

## 2025-05-16 PROCEDURE — 3080F DIAST BP >= 90 MM HG: CPT | Performed by: STUDENT IN AN ORGANIZED HEALTH CARE EDUCATION/TRAINING PROGRAM

## 2025-05-16 PROCEDURE — 36415 COLL VENOUS BLD VENIPUNCTURE: CPT

## 2025-05-16 PROCEDURE — 90937 HEMODIALYSIS REPEATED EVAL: CPT | Mod: V7

## 2025-05-16 PROCEDURE — 99214 OFFICE O/P EST MOD 30 MIN: CPT | Mod: 24 | Performed by: STUDENT IN AN ORGANIZED HEALTH CARE EDUCATION/TRAINING PROGRAM

## 2025-05-16 PROCEDURE — 87086 URINE CULTURE/COLONY COUNT: CPT | Performed by: TRANSPLANT SURGERY

## 2025-05-16 RX ORDER — ONDANSETRON HYDROCHLORIDE 2 MG/ML
4 INJECTION, SOLUTION INTRAVENOUS
OUTPATIENT
Start: 2025-05-19

## 2025-05-16 RX ORDER — ACETAMINOPHEN 325 MG/1
650 TABLET ORAL EVERY 4 HOURS PRN
OUTPATIENT
Start: 2025-05-19

## 2025-05-16 RX ORDER — LIDOCAINE AND PRILOCAINE 25; 25 MG/G; MG/G
1 CREAM TOPICAL AS NEEDED
OUTPATIENT
Start: 2025-05-19

## 2025-05-16 RX ORDER — FUROSEMIDE 40 MG/1
80 TABLET ORAL DAILY
Qty: 60 TABLET | Refills: 0 | Status: SHIPPED | OUTPATIENT
Start: 2025-05-16 | End: 2025-06-15

## 2025-05-16 RX ORDER — TACROLIMUS 1 MG/1
6 CAPSULE ORAL 2 TIMES DAILY
Qty: 360 CAPSULE | Refills: 11 | Status: SHIPPED | OUTPATIENT
Start: 2025-05-16 | End: 2026-05-16

## 2025-05-16 RX ORDER — HEPARIN SODIUM 10000 [USP'U]/ML
8000 INJECTION, SOLUTION INTRAVENOUS; SUBCUTANEOUS ONCE
Status: COMPLETED | OUTPATIENT
Start: 2025-05-16 | End: 2025-05-16

## 2025-05-16 RX ORDER — DIPHENHYDRAMINE HCL 25 MG
25 CAPSULE ORAL
OUTPATIENT
Start: 2025-05-19

## 2025-05-16 RX ORDER — HEPARIN SODIUM 10000 [USP'U]/ML
8000 INJECTION, SOLUTION INTRAVENOUS; SUBCUTANEOUS ONCE
OUTPATIENT
Start: 2025-05-19 | End: 2025-05-19

## 2025-05-16 RX ADMIN — HEPARIN SODIUM 8000 UNITS: 10000 INJECTION INTRAVENOUS; SUBCUTANEOUS at 15:36

## 2025-05-16 RX ADMIN — DARBEPOETIN ALFA 200 MCG: 200 INJECTION, SOLUTION INTRAVENOUS; SUBCUTANEOUS at 17:28

## 2025-05-16 ASSESSMENT — PAIN - FUNCTIONAL ASSESSMENT: PAIN_FUNCTIONAL_ASSESSMENT: NO/DENIES PAIN

## 2025-05-16 ASSESSMENT — PAIN SCALES - GENERAL
PAINLEVEL_OUTOF10: 0-NO PAIN
PAINLEVEL_OUTOF10: 0 - NO PAIN

## 2025-05-16 NOTE — HH CARE COORDINATION
Home Care received a Referral for Nursing. We have processed the referral for a Start of Care on 5/18/25.     If you have any questions or concerns, please feel free to contact us at [phone]. Follow the prompts, enter your five digit zip code, and you will be directed to your care team on [facility] 3.

## 2025-05-16 NOTE — PATIENT INSTRUCTIONS
Decrease lasix 80 mg daily  We started packing your wound today. This will be required to be changed daily  Sent home care orders  If home care does not come out by tomorrow, please remove the packing tomorrow in the shower and keep it covered with gauze until home care can come out  Labs 3x weekly before dialysis  RTC 1 week  We'll call you later today if you need dialysis

## 2025-05-16 NOTE — PROGRESS NOTES
TRANSPLANT SURGERY FOLLOW UP    Reason for visit:    Tanmay Mcneill underwent transplant surgery,  4/19/2025 (Kidney), and returns to clinic for follow up evaluation focusing on their current immunosuppression. Specifically, Tanmay Mcneill's regiment was evaluated to ensure adequate levels to prevent life threatening or organ function impairing rejection while not increasing risk of adverse effects including malignancy, infection, bone health, or accelerated cardiovascular disease.  I reviewed common side effects including tremor, hair loss, GI toxicity, and agitation.  The patient reported that they were experiencing: none.    The long-term management of maintenance immunosuppression in organ transplant recipients remains complex given differences in clinical characteristics, comorbid conditions, and prior rejection episodes.  These factors were evaluated at this visit and used in formulating this plan of care. Immunosuppression following the transplant is medically necessary to closely monitor and to reduce the risk of post-operative complications distinct from the post operative management of the transplant surgical procedure.     Interval history  Doing well  Good uop and hydration    Problem List[1]    Medications:    Current Outpatient Medications   Medication Instructions    acyclovir (ZOVIRAX) 400 mg, oral, 2 times daily    albuterol (Ventolin HFA) 90 mcg/actuation inhaler 2 puffs, inhalation, Every 4 hours PRN    alcohol swabs (Alcohol Pads) Use 4-8 per day to check blood glucose and for injectable medications    amLODIPine (NORVASC) 10 mg, oral, Daily    amoxicillin-clavulanate (Augmentin) 500-125 mg tablet 1 tablet, oral, 2 times daily    aspirin 81 mg, oral, Daily    atorvastatin (Lipitor) 80 mg tablet 1 tablet, oral, Daily    blood sugar diagnostic (Accu-Chek Celi Plus test strp) strip 1 strip, 4 times daily    blood sugar diagnostic (Blood Glucose Test) Check blood glucose 4 time per day     "blood-glucose meter misc Use to check blood glucose    calcium carbonate-vitamin D3 600 mg-20 mcg (800 unit) tablet,chewable 600 mg, oral, 2 times daily    carvedilol (COREG) 12.5 mg, oral, 2 times daily    cholecalciferol (VITAMIN D-3) 25 mcg, oral, Daily    clotrimazole (MYCELEX) 10 mg, Mouth/Throat, 3 times daily after meals    furosemide (LASIX) 80 mg, oral, Daily    insulin glargine-yfgn (SEMGLEE(INSULIN GLARG-YFGN)PEN) 30 Units, subcutaneous, Daily, Take as directed per insulin instructions.    insulin lispro (HUMALOG) 0-10 Units, subcutaneous, 4 times daily before meals and nightly, Take 18-18-14 units with meals plus sliding scale.    lancets (OneTouch UltraSoft Lancets) misc 1 Lancet, miscellaneous, 2 times daily    lancets 33 gauge misc 1 each, miscellaneous, 4 times daily Insulin    letermovir (PREVYMIS) 480 mg, oral, Daily    [Paused] Mounjaro 2.5 mg, subcutaneous, Every 7 days    mycophenolate (CELLCEPT) 1,000 mg, oral, 2 times daily    pantoprazole (PROTONIX) 40 mg, oral, Daily before breakfast, Do not crush, chew, or split.    pen needle, diabetic 31 gauge x 1/4\" needle miscellaneous    pen needle, diabetic 31 gauge x 3/16\" needle 1 each, miscellaneous, 4 times daily Insulin    predniSONE (DELTASONE) 10 mg, oral, Daily    sulfamethoxazole-trimethoprim (Bactrim) 400-80 mg tablet 1 tablet, oral, Daily    tacrolimus (PROGRAF) 6 mg, oral, 2 times daily    zolpidem (Ambien) 5 mg tablet 0.5 tablets, oral, Nightly PRN       Physical Exam  Vitals:    05/16/25 0829   BP: (!) 169/97   Pulse: 76   Temp: 36.6 °C (97.8 °F)   SpO2: 97%        Vitals:    05/16/25 0829   BP: (!) 169/97   Pulse: 76   Temp: 36.6 °C (97.8 °F)   SpO2: 97%       General: Alert and oriented to time, place and person. Not in distress  ENT: unremarkable  Cardiovascular: Regular rate, normotensive  Respiratory: no signs of labored breathing on room air  Abdomen/ GI: Kidney transplant incision with staples in place, seroma draining, probing " very deep and copious amount of drainage expressed today in clinic, packed  Extremity: BLE trace edema -  Skin: no rash    ALLERGY:  Allergies[2]    LABS:  No results found for this or any previous visit (from the past 24 hours).   Lab Results   Component Value Date    ALT 15 04/19/2025    AST 14 04/19/2025    ALKPHOS 88 04/19/2025    BILITOT 0.4 04/19/2025         ASSESSMENT AND PLAN:    Mr. Mcneill is a 39 y.o. male who underwent  transplant surgery on 4/19/2025 (Kidney).    DCD donor, KDPI 48%  Full cava used as venous extension 2/2 depth  DGF, HD  M,W,F   Lasix 80 PO BID - decrease to 80 daily  Stent out 5/13 - had false passage + difficult hyatt / asked him to notify urology   Drained seroma in clinic - continue staples - packed  CT and US last week for increased edema RLE (CT reviewed, some air foci in muscle, started augmentin)    Holding HD as of 2d ago, on DGF protocol    1. Immunosuppression reviewed and adjusted   Thymo 4.5      Tacrolimus: Yes - 6 bid, goal 8-10       Mycophenolate: Yes - 1000 mg bid      Prednisone: Yes - 10 mg daily    2. Prophylaxis adherence protocol to prevent immunosuppression related infection      Prevymis/ Acyclovir/ Bactrim/ Sallie    3. Hypertension         Blood Pressures         5/16/2025  0829             BP: 169/97              Current antihypertensives: Norvasc 10          4. Wound/BRETT      [place] ready for removal: No   Wound probed, large amount of seroma drained, packed, completing Augmentin   Home health being set up    5. GI prophylaxis       On PPI     6.  Follow up:       Labs  3 times per week       RTC: 1 week(s)    I spent a total of 32 min providing care for this patient including record review, examination, face to face counseling, and documentation.     Christ Hernandez MD    Transplant Surgery Attending         [1]   Patient Active Problem List  Diagnosis    Acid reflux    Arteriovenous graft for hemodialysis in place, primary    Asthma    CKD (chronic kidney  disease), stage V (Multi)    Chronic pain of right knee    Dyslipidemia    Hypertension    Morbid obesity (Multi)    Osteoarthritis    Snoring    Type 2 diabetes mellitus    Vitamin D deficiency    Transplant    Preop cardiovascular exam    ESRD (end stage renal disease) (Multi)    Type 2 diabetes mellitus with hyperglycemia, with long-term current use of insulin    Steroid-induced hyperglycemia    Delayed graft function of kidney (HHS-HCC)   [2] No Known Allergies

## 2025-05-16 NOTE — PROGRESS NOTES
Subjective   Tanmay Mcneill is a 39 y.o. male who underwent *** on 4/19/2025 (Kidney). Here today for follow-up.    Review of Systems     Objective   The home BP readings have been in the ***'s to ***'s range.  Exam  Gen: AAOx3, NAD  Card: Regular rate and rhythm  Resp: sat well room air, equal chest rise  Abd: incision c/d/I, no erythema or induration  Ext: no lower extremity edema  Lab Results   Component Value Date    CREATININE 6.28 (H) 05/14/2025    K 3.8 05/14/2025    GLUCOSE 227 (H) 05/14/2025    HCT 25.9 (L) 05/14/2025    WBC 6.4 05/14/2025     05/14/2025    CALCIUM 7.8 (L) 05/14/2025     Assessment/Plan      S/p *** on 4/19/2025 (Kidney)    Immunosuppression     3.   Follow up

## 2025-05-16 NOTE — TELEPHONE ENCOUNTER
Neris Hernandez,    Home Care received Home Care Orders (HCOs) for Tanmay Mcneill for Nursing and Daily Wound Care.  Unfortunately, [facility] is not able to go to  his home daily to perform wound care, there must be a caregiver or someone to learn and perform the care. Our nurses can come out a few times a week to change the dressing, assess his wound and it's healing. They can also help him order supplies, however they do not readily carry wound care supplies. Please confirm if patient has a Caregiver that is teachable and willing to assist him with his wound care needs? If he does not have a caregiver or can not perform his own wound dressing care, [facility] will need to decline the referral due to not having the ability to staff and to perform daily services.     [facility] appreciates your time and Home Care referrals.     Thank you,   Desi See, CNP  [facility]   [phone]

## 2025-05-17 ENCOUNTER — DOCUMENTATION (OUTPATIENT)
Facility: HOSPITAL | Age: 40
End: 2025-05-17
Payer: COMMERCIAL

## 2025-05-17 LAB — BACTERIA UR CULT: NO GROWTH

## 2025-05-18 ENCOUNTER — HOME CARE VISIT (OUTPATIENT)
Dept: HOME HEALTH SERVICES | Facility: HOME HEALTH | Age: 40
End: 2025-05-18
Payer: MEDICARE

## 2025-05-18 DIAGNOSIS — Z94.0 KIDNEY REPLACED BY TRANSPLANT (HHS-HCC): ICD-10-CM

## 2025-05-18 PROCEDURE — 169592 NO-PAY CLAIM PROCEDURE

## 2025-05-18 PROCEDURE — G0299 HHS/HOSPICE OF RN EA 15 MIN: HCPCS | Mod: HHH

## 2025-05-19 ENCOUNTER — NURSE ONLY (OUTPATIENT)
Facility: HOSPITAL | Age: 40
End: 2025-05-19
Payer: MEDICARE

## 2025-05-19 ENCOUNTER — DOCUMENTATION (OUTPATIENT)
Dept: DIALYSIS | Facility: HOSPITAL | Age: 40
End: 2025-05-19
Payer: COMMERCIAL

## 2025-05-19 ENCOUNTER — APPOINTMENT (OUTPATIENT)
Dept: DIALYSIS | Facility: HOSPITAL | Age: 40
End: 2025-05-19
Payer: COMMERCIAL

## 2025-05-19 DIAGNOSIS — Z94.0 KIDNEY REPLACED BY TRANSPLANT (HHS-HCC): ICD-10-CM

## 2025-05-19 LAB
ALBUMIN SERPL BCP-MCNC: 3.8 G/DL (ref 3.4–5)
ANION GAP SERPL CALC-SCNC: 16 MMOL/L (ref 10–20)
BUN SERPL-MCNC: 40 MG/DL (ref 6–23)
CALCIUM SERPL-MCNC: 8.5 MG/DL (ref 8.6–10.6)
CHLORIDE SERPL-SCNC: 104 MMOL/L (ref 98–107)
CO2 SERPL-SCNC: 22 MMOL/L (ref 21–32)
CREAT SERPL-MCNC: 5.73 MG/DL (ref 0.5–1.3)
EGFRCR SERPLBLD CKD-EPI 2021: 12 ML/MIN/1.73M*2
ERYTHROCYTE [DISTWIDTH] IN BLOOD BY AUTOMATED COUNT: 19 % (ref 11.5–14.5)
GLUCOSE SERPL-MCNC: 146 MG/DL (ref 74–99)
HCT VFR BLD AUTO: 30 % (ref 41–52)
HGB BLD-MCNC: 8.6 G/DL (ref 13.5–17.5)
MAGNESIUM SERPL-MCNC: 1.59 MG/DL (ref 1.6–2.4)
MCH RBC QN AUTO: 24.4 PG (ref 26–34)
MCHC RBC AUTO-ENTMCNC: 28.7 G/DL (ref 32–36)
MCV RBC AUTO: 85 FL (ref 80–100)
NRBC BLD-RTO: 0 /100 WBCS (ref 0–0)
PHOSPHATE SERPL-MCNC: 3.8 MG/DL (ref 2.5–4.9)
PLATELET # BLD AUTO: 324 X10*3/UL (ref 150–450)
POTASSIUM SERPL-SCNC: 4.1 MMOL/L (ref 3.5–5.3)
RBC # BLD AUTO: 3.52 X10*6/UL (ref 4.5–5.9)
SODIUM SERPL-SCNC: 138 MMOL/L (ref 136–145)
TACROLIMUS BLD-MCNC: 7.6 NG/ML
WBC # BLD AUTO: 7.4 X10*3/UL (ref 4.4–11.3)

## 2025-05-19 PROCEDURE — 83735 ASSAY OF MAGNESIUM: CPT

## 2025-05-19 PROCEDURE — RXMED WILLOW AMBULATORY MEDICATION CHARGE

## 2025-05-19 PROCEDURE — 80197 ASSAY OF TACROLIMUS: CPT

## 2025-05-19 PROCEDURE — 36415 COLL VENOUS BLD VENIPUNCTURE: CPT

## 2025-05-19 PROCEDURE — 80069 RENAL FUNCTION PANEL: CPT

## 2025-05-19 PROCEDURE — 85027 COMPLETE CBC AUTOMATED: CPT

## 2025-05-19 RX ORDER — PANTOPRAZOLE SODIUM 40 MG/1
40 TABLET, DELAYED RELEASE ORAL
Qty: 30 TABLET | Refills: 0 | OUTPATIENT
Start: 2025-05-19 | End: 2025-06-18

## 2025-05-19 NOTE — PROGRESS NOTES
Pt had labs drawn earlier today, 5/19/25. Dr. Feliz reviewed labs and stated pt can skip tx for today and have labs redrawn early Wednesday morning.    Pt was called by this RN and made aware.

## 2025-05-20 ENCOUNTER — TELEPHONE (OUTPATIENT)
Facility: HOSPITAL | Age: 40
End: 2025-05-20
Payer: COMMERCIAL

## 2025-05-20 VITALS
DIASTOLIC BLOOD PRESSURE: 58 MMHG | BODY MASS INDEX: 41.75 KG/M2 | TEMPERATURE: 98.3 F | WEIGHT: 315 LBS | HEART RATE: 71 BPM | SYSTOLIC BLOOD PRESSURE: 140 MMHG | HEIGHT: 73 IN | OXYGEN SATURATION: 99 %

## 2025-05-20 ASSESSMENT — ACTIVITIES OF DAILY LIVING (ADL)
AMBULATION ASSISTANCE: SUPERVISION
OASIS_M1830: 02
ENTERING_EXITING_HOME: NEEDS ASSISTANCE
PHYSICAL TRANSFERS ASSESSED: 1
CURRENT_FUNCTION: SUPERVISION
AMBULATION ASSISTANCE: 1

## 2025-05-20 ASSESSMENT — ENCOUNTER SYMPTOMS
PERSON REPORTING PAIN: PATIENT
APPETITE LEVEL: GOOD
NAUSEA: 1
CHANGE IN APPETITE: UNCHANGED
DENIES PAIN: 1
DIARRHEA: 1

## 2025-05-20 NOTE — TELEPHONE ENCOUNTER
Faxed PT's medical records to [employer]. critical illness department. Fax [phone] Attn policy #[id number]

## 2025-05-21 ENCOUNTER — NURSE ONLY (OUTPATIENT)
Facility: HOSPITAL | Age: 40
End: 2025-05-21
Payer: MEDICARE

## 2025-05-21 DIAGNOSIS — Z94.0 KIDNEY REPLACED BY TRANSPLANT (HHS-HCC): ICD-10-CM

## 2025-05-21 LAB
ALBUMIN SERPL BCP-MCNC: 4.2 G/DL (ref 3.4–5)
ANION GAP SERPL CALC-SCNC: 18 MMOL/L (ref 10–20)
BUN SERPL-MCNC: 41 MG/DL (ref 6–23)
CALCIUM SERPL-MCNC: 8.6 MG/DL (ref 8.6–10.6)
CHLORIDE SERPL-SCNC: 104 MMOL/L (ref 98–107)
CO2 SERPL-SCNC: 20 MMOL/L (ref 21–32)
CREAT SERPL-MCNC: 4.88 MG/DL (ref 0.5–1.3)
EGFRCR SERPLBLD CKD-EPI 2021: 14 ML/MIN/1.73M*2
ERYTHROCYTE [DISTWIDTH] IN BLOOD BY AUTOMATED COUNT: 18.7 % (ref 11.5–14.5)
GLUCOSE SERPL-MCNC: 177 MG/DL (ref 74–99)
HCT VFR BLD AUTO: 31.8 % (ref 41–52)
HGB BLD-MCNC: 9.1 G/DL (ref 13.5–17.5)
MAGNESIUM SERPL-MCNC: 1.64 MG/DL (ref 1.6–2.4)
MCH RBC QN AUTO: 24 PG (ref 26–34)
MCHC RBC AUTO-ENTMCNC: 28.6 G/DL (ref 32–36)
MCV RBC AUTO: 84 FL (ref 80–100)
NRBC BLD-RTO: 0 /100 WBCS (ref 0–0)
PHOSPHATE SERPL-MCNC: 3.8 MG/DL (ref 2.5–4.9)
PLATELET # BLD AUTO: 309 X10*3/UL (ref 150–450)
POTASSIUM SERPL-SCNC: 4.5 MMOL/L (ref 3.5–5.3)
RBC # BLD AUTO: 3.79 X10*6/UL (ref 4.5–5.9)
SODIUM SERPL-SCNC: 137 MMOL/L (ref 136–145)
TACROLIMUS BLD-MCNC: 7.3 NG/ML
WBC # BLD AUTO: 7.3 X10*3/UL (ref 4.4–11.3)

## 2025-05-21 PROCEDURE — 87801 DETECT AGNT MULT DNA AMPLI: CPT | Performed by: TRANSPLANT SURGERY

## 2025-05-21 PROCEDURE — 80197 ASSAY OF TACROLIMUS: CPT

## 2025-05-21 PROCEDURE — 85027 COMPLETE CBC AUTOMATED: CPT

## 2025-05-21 PROCEDURE — 83735 ASSAY OF MAGNESIUM: CPT

## 2025-05-21 PROCEDURE — 36415 COLL VENOUS BLD VENIPUNCTURE: CPT

## 2025-05-21 PROCEDURE — 80069 RENAL FUNCTION PANEL: CPT

## 2025-05-22 NOTE — PATIENT INSTRUCTIONS
Medication Changes:  Decrease prednisone to 5mg daily  Ok to stop pantoprazole from transplant standpoint. Doctor previously managing reflux can determine medication regimen moving forward    Plan:  Let us know if your blood pressure top number goes up over 160  Change packing and dressing every day  Labs twice a week, Tues & Fri  Next clinic appt in 1 week - please stop at the desk on your way out to make your appt

## 2025-05-23 ENCOUNTER — HOME CARE VISIT (OUTPATIENT)
Dept: HOME HEALTH SERVICES | Facility: HOME HEALTH | Age: 40
End: 2025-05-23
Payer: MEDICARE

## 2025-05-23 ENCOUNTER — NURSE ONLY (OUTPATIENT)
Facility: HOSPITAL | Age: 40
End: 2025-05-23
Payer: MEDICARE

## 2025-05-23 ENCOUNTER — OFFICE VISIT (OUTPATIENT)
Facility: HOSPITAL | Age: 40
End: 2025-05-23
Payer: MEDICARE

## 2025-05-23 VITALS
BODY MASS INDEX: 45.19 KG/M2 | HEART RATE: 81 BPM | TEMPERATURE: 97.2 F | SYSTOLIC BLOOD PRESSURE: 169 MMHG | DIASTOLIC BLOOD PRESSURE: 82 MMHG | WEIGHT: 315 LBS | OXYGEN SATURATION: 98 %

## 2025-05-23 DIAGNOSIS — Z94.0 KIDNEY REPLACED BY TRANSPLANT (HHS-HCC): ICD-10-CM

## 2025-05-23 LAB
ALBUMIN SERPL BCP-MCNC: 4.5 G/DL (ref 3.4–5)
ANION GAP SERPL CALC-SCNC: 17 MMOL/L (ref 10–20)
BUN SERPL-MCNC: 37 MG/DL (ref 6–23)
CALCIUM SERPL-MCNC: 8.7 MG/DL (ref 8.6–10.6)
CHLORIDE SERPL-SCNC: 105 MMOL/L (ref 98–107)
CO2 SERPL-SCNC: 20 MMOL/L (ref 21–32)
CREAT SERPL-MCNC: 4.15 MG/DL (ref 0.5–1.3)
EGFRCR SERPLBLD CKD-EPI 2021: 18 ML/MIN/1.73M*2
ERYTHROCYTE [DISTWIDTH] IN BLOOD BY AUTOMATED COUNT: 18.8 % (ref 11.5–14.5)
GLUCOSE SERPL-MCNC: 242 MG/DL (ref 74–99)
HCT VFR BLD AUTO: 32.8 % (ref 41–52)
HGB BLD-MCNC: 9.5 G/DL (ref 13.5–17.5)
MAGNESIUM SERPL-MCNC: 1.59 MG/DL (ref 1.6–2.4)
MCH RBC QN AUTO: 24.2 PG (ref 26–34)
MCHC RBC AUTO-ENTMCNC: 29 G/DL (ref 32–36)
MCV RBC AUTO: 84 FL (ref 80–100)
NRBC BLD-RTO: 0 /100 WBCS (ref 0–0)
PHOSPHATE SERPL-MCNC: 4.1 MG/DL (ref 2.5–4.9)
PLATELET # BLD AUTO: 321 X10*3/UL (ref 150–450)
POTASSIUM SERPL-SCNC: 4.8 MMOL/L (ref 3.5–5.3)
RBC # BLD AUTO: 3.93 X10*6/UL (ref 4.5–5.9)
SODIUM SERPL-SCNC: 137 MMOL/L (ref 136–145)
TACROLIMUS BLD-MCNC: 8.4 NG/ML
WBC # BLD AUTO: 7.9 X10*3/UL (ref 4.4–11.3)

## 2025-05-23 PROCEDURE — 85027 COMPLETE CBC AUTOMATED: CPT

## 2025-05-23 PROCEDURE — 3079F DIAST BP 80-89 MM HG: CPT | Performed by: STUDENT IN AN ORGANIZED HEALTH CARE EDUCATION/TRAINING PROGRAM

## 2025-05-23 PROCEDURE — 83735 ASSAY OF MAGNESIUM: CPT

## 2025-05-23 PROCEDURE — 80069 RENAL FUNCTION PANEL: CPT

## 2025-05-23 PROCEDURE — 99215 OFFICE O/P EST HI 40 MIN: CPT | Mod: 24

## 2025-05-23 PROCEDURE — 36415 COLL VENOUS BLD VENIPUNCTURE: CPT

## 2025-05-23 PROCEDURE — 3077F SYST BP >= 140 MM HG: CPT | Performed by: STUDENT IN AN ORGANIZED HEALTH CARE EDUCATION/TRAINING PROGRAM

## 2025-05-23 PROCEDURE — 80197 ASSAY OF TACROLIMUS: CPT

## 2025-05-23 PROCEDURE — 99215 OFFICE O/P EST HI 40 MIN: CPT

## 2025-05-23 RX ORDER — PREDNISONE 5 MG/1
5 TABLET ORAL DAILY
Qty: 90 TABLET | Refills: 3 | Status: SHIPPED | OUTPATIENT
Start: 2025-05-23 | End: 2026-05-23

## 2025-05-23 ASSESSMENT — PAIN SCALES - GENERAL: PAINLEVEL_OUTOF10: 0-NO PAIN

## 2025-05-23 NOTE — PROGRESS NOTES
Subjective   Tanmay Mcneill is a 39 y.o. male who underwent DDKT on 4/19/2025 (Kidney). Here today for follow-up.    Doing well. Some RLQ edema. This was drained in clinic  NO lower extremity edema.   Increasing UOP +1L daily  Still on some iHD    Review of Systems     Objective   /82   Pulse 81   Temp 36.2 °C (97.2 °F) (Temporal)   Wt (!) 155 kg (342 lb 8 oz)   SpO2 98%   BMI 45.19 kg/m²   Exam  Gen: AAOx3, NAD  Card: Regular rate and rhythm  Resp: sat well room air, equal chest rise  Abd: incision c/d/I, no erythema. + soft edema RLQ  Ext: no lower extremity edema  Lab Results   Component Value Date    CREATININE 4.88 (H) 05/21/2025    K 4.5 05/21/2025    GLUCOSE 177 (H) 05/21/2025    HCT 31.8 (L) 05/21/2025    WBC 7.3 05/21/2025     05/21/2025    CALCIUM 8.6 05/21/2025     Assessment/Plan      ESRD 2/2 DM and HTN     S/p DDKT 4/19/25   DCD donor, KDPI 48%  Full cava used as venous extension 2/2 depth  DGF, HD  M,W,F   Lasix 80 PO BID  Stent out 5/13 - had false passage + difficult hyatt / asked him to notify urology   Drained seroma in clinic - continue staples  CT and US today for increased edema RLE (CT reviewed, some air foci in muscle, started augmentin)     Immunosuppression   PRA 19%  Goal thymo 4.5mg/kg - completed  Tacrolimus - cont 4/4  MMF 1 g BID  Prednisone to 10 mg today     3. Diabetes  Appreciate endocrine  Sugars well controlled at home     4. Ppx  CMV: D-/R+: Acyclovir and prevymis (cost)  EBV: D+/R+  Bactrim    5. HTN  Amlodipine 10 mg  Coreg  25 mg     Follow up 1 week  Labs Three times weekly w/ HD     Donor Kidney Info:  Etiology:  DCD  Risk: no but was lung tx recipient  Terminal Cr  1.08   KDPI: 48 %  PRA: 19 %  CMV: D-/R+  EBV: D+/R+  Toxo: Donor Negative  HCV Ab/MELISSA: Negative  HBcAB: Negative  HBsAg/HBV MELISSA: Negative

## 2025-05-24 LAB — HIV1 RNA SERPL DONR QL PROBE AMP: NORMAL

## 2025-05-25 ENCOUNTER — PHARMACY VISIT (OUTPATIENT)
Dept: PHARMACY | Facility: CLINIC | Age: 40
End: 2025-05-25
Payer: COMMERCIAL

## 2025-05-27 ENCOUNTER — NURSE ONLY (OUTPATIENT)
Facility: HOSPITAL | Age: 40
End: 2025-05-27
Payer: MEDICARE

## 2025-05-27 DIAGNOSIS — Z94.0 KIDNEY REPLACED BY TRANSPLANT (HHS-HCC): ICD-10-CM

## 2025-05-27 LAB
ALBUMIN SERPL BCP-MCNC: 4.2 G/DL (ref 3.4–5)
ANION GAP SERPL CALC-SCNC: 15 MMOL/L (ref 10–20)
BUN SERPL-MCNC: 33 MG/DL (ref 6–23)
CALCIUM SERPL-MCNC: 9 MG/DL (ref 8.6–10.6)
CHLORIDE SERPL-SCNC: 108 MMOL/L (ref 98–107)
CO2 SERPL-SCNC: 19 MMOL/L (ref 21–32)
CREAT SERPL-MCNC: 3.55 MG/DL (ref 0.5–1.3)
EGFRCR SERPLBLD CKD-EPI 2021: 21 ML/MIN/1.73M*2
ERYTHROCYTE [DISTWIDTH] IN BLOOD BY AUTOMATED COUNT: 18.3 % (ref 11.5–14.5)
GLUCOSE SERPL-MCNC: 237 MG/DL (ref 74–99)
HCT VFR BLD AUTO: 32.4 % (ref 41–52)
HGB BLD-MCNC: 9.4 G/DL (ref 13.5–17.5)
MAGNESIUM SERPL-MCNC: 1.51 MG/DL (ref 1.6–2.4)
MCH RBC QN AUTO: 23.9 PG (ref 26–34)
MCHC RBC AUTO-ENTMCNC: 29 G/DL (ref 32–36)
MCV RBC AUTO: 82 FL (ref 80–100)
NRBC BLD-RTO: 0 /100 WBCS (ref 0–0)
PHOSPHATE SERPL-MCNC: 4 MG/DL (ref 2.5–4.9)
PLATELET # BLD AUTO: 245 X10*3/UL (ref 150–450)
POTASSIUM SERPL-SCNC: 5.2 MMOL/L (ref 3.5–5.3)
RBC # BLD AUTO: 3.94 X10*6/UL (ref 4.5–5.9)
SODIUM SERPL-SCNC: 137 MMOL/L (ref 136–145)
TACROLIMUS BLD-MCNC: 9.6 NG/ML
WBC # BLD AUTO: 9.8 X10*3/UL (ref 4.4–11.3)

## 2025-05-27 PROCEDURE — 80069 RENAL FUNCTION PANEL: CPT | Performed by: STUDENT IN AN ORGANIZED HEALTH CARE EDUCATION/TRAINING PROGRAM

## 2025-05-27 PROCEDURE — 80197 ASSAY OF TACROLIMUS: CPT | Performed by: STUDENT IN AN ORGANIZED HEALTH CARE EDUCATION/TRAINING PROGRAM

## 2025-05-27 PROCEDURE — 85027 COMPLETE CBC AUTOMATED: CPT | Performed by: STUDENT IN AN ORGANIZED HEALTH CARE EDUCATION/TRAINING PROGRAM

## 2025-05-27 PROCEDURE — 83735 ASSAY OF MAGNESIUM: CPT | Performed by: STUDENT IN AN ORGANIZED HEALTH CARE EDUCATION/TRAINING PROGRAM

## 2025-05-27 PROCEDURE — 86832 HLA CLASS I HIGH DEFIN QUAL: CPT | Performed by: STUDENT IN AN ORGANIZED HEALTH CARE EDUCATION/TRAINING PROGRAM

## 2025-05-27 PROCEDURE — 36415 COLL VENOUS BLD VENIPUNCTURE: CPT | Performed by: STUDENT IN AN ORGANIZED HEALTH CARE EDUCATION/TRAINING PROGRAM

## 2025-05-28 DIAGNOSIS — Z94.0 KIDNEY REPLACED BY TRANSPLANT (HHS-HCC): ICD-10-CM

## 2025-05-29 LAB
HLA RESULTS: NORMAL
HLA-A+B+C AB NFR SER: NORMAL %
HLA-DP+DQ+DR AB NFR SER: NORMAL %

## 2025-05-30 ENCOUNTER — OFFICE VISIT (OUTPATIENT)
Facility: HOSPITAL | Age: 40
End: 2025-05-30
Payer: MEDICARE

## 2025-05-30 ENCOUNTER — PATIENT MESSAGE (OUTPATIENT)
Facility: HOSPITAL | Age: 40
End: 2025-05-30

## 2025-05-30 ENCOUNTER — APPOINTMENT (OUTPATIENT)
Facility: HOSPITAL | Age: 40
End: 2025-05-30
Payer: MEDICARE

## 2025-05-30 VITALS
SYSTOLIC BLOOD PRESSURE: 170 MMHG | HEART RATE: 74 BPM | WEIGHT: 315 LBS | TEMPERATURE: 97.1 F | OXYGEN SATURATION: 98 % | BODY MASS INDEX: 44.54 KG/M2 | DIASTOLIC BLOOD PRESSURE: 81 MMHG

## 2025-05-30 DIAGNOSIS — Z94.0 IMMUNOSUPPRESSIVE MANAGEMENT ENCOUNTER FOLLOWING KIDNEY TRANSPLANT: Primary | ICD-10-CM

## 2025-05-30 DIAGNOSIS — Z94.0 KIDNEY REPLACED BY TRANSPLANT (HHS-HCC): ICD-10-CM

## 2025-05-30 DIAGNOSIS — E87.20 METABOLIC ACIDOSIS: ICD-10-CM

## 2025-05-30 DIAGNOSIS — K21.9 GASTROESOPHAGEAL REFLUX DISEASE, UNSPECIFIED WHETHER ESOPHAGITIS PRESENT: ICD-10-CM

## 2025-05-30 DIAGNOSIS — Z79.4 TYPE 2 DIABETES MELLITUS WITH HYPERGLYCEMIA, WITH LONG-TERM CURRENT USE OF INSULIN: Chronic | ICD-10-CM

## 2025-05-30 DIAGNOSIS — R73.9 STEROID-INDUCED HYPERGLYCEMIA: ICD-10-CM

## 2025-05-30 DIAGNOSIS — Z79.899 IMMUNOSUPPRESSIVE MANAGEMENT ENCOUNTER FOLLOWING KIDNEY TRANSPLANT: Primary | ICD-10-CM

## 2025-05-30 DIAGNOSIS — E11.65 TYPE 2 DIABETES MELLITUS WITH HYPERGLYCEMIA, WITH LONG-TERM CURRENT USE OF INSULIN: Chronic | ICD-10-CM

## 2025-05-30 DIAGNOSIS — T38.0X5A STEROID-INDUCED HYPERGLYCEMIA: ICD-10-CM

## 2025-05-30 LAB
ALBUMIN SERPL BCP-MCNC: 4.1 G/DL (ref 3.4–5)
ANION GAP SERPL CALC-SCNC: 16 MMOL/L (ref 10–20)
BUN SERPL-MCNC: 30 MG/DL (ref 6–23)
CALCIUM SERPL-MCNC: 8.8 MG/DL (ref 8.6–10.6)
CHLORIDE SERPL-SCNC: 111 MMOL/L (ref 98–107)
CO2 SERPL-SCNC: 15 MMOL/L (ref 21–32)
CREAT SERPL-MCNC: 3.39 MG/DL (ref 0.5–1.3)
EGFRCR SERPLBLD CKD-EPI 2021: 22 ML/MIN/1.73M*2
ERYTHROCYTE [DISTWIDTH] IN BLOOD BY AUTOMATED COUNT: 18.3 % (ref 11.5–14.5)
GLUCOSE SERPL-MCNC: 228 MG/DL (ref 74–99)
HCT VFR BLD AUTO: 33 % (ref 41–52)
HGB BLD-MCNC: 9.2 G/DL (ref 13.5–17.5)
MAGNESIUM SERPL-MCNC: 1.5 MG/DL (ref 1.6–2.4)
MCH RBC QN AUTO: 23.7 PG (ref 26–34)
MCHC RBC AUTO-ENTMCNC: 27.9 G/DL (ref 32–36)
MCV RBC AUTO: 85 FL (ref 80–100)
NRBC BLD-RTO: 0 /100 WBCS (ref 0–0)
PHOSPHATE SERPL-MCNC: 3.3 MG/DL (ref 2.5–4.9)
PLATELET # BLD AUTO: 224 X10*3/UL (ref 150–450)
POTASSIUM SERPL-SCNC: 5.6 MMOL/L (ref 3.5–5.3)
RBC # BLD AUTO: 3.89 X10*6/UL (ref 4.5–5.9)
SODIUM SERPL-SCNC: 136 MMOL/L (ref 136–145)
TACROLIMUS BLD-MCNC: 9.6 NG/ML
WBC # BLD AUTO: 9.5 X10*3/UL (ref 4.4–11.3)

## 2025-05-30 PROCEDURE — 85027 COMPLETE CBC AUTOMATED: CPT | Performed by: STUDENT IN AN ORGANIZED HEALTH CARE EDUCATION/TRAINING PROGRAM

## 2025-05-30 PROCEDURE — 99213 OFFICE O/P EST LOW 20 MIN: CPT | Mod: 24

## 2025-05-30 PROCEDURE — 80197 ASSAY OF TACROLIMUS: CPT | Performed by: STUDENT IN AN ORGANIZED HEALTH CARE EDUCATION/TRAINING PROGRAM

## 2025-05-30 PROCEDURE — 83735 ASSAY OF MAGNESIUM: CPT | Performed by: STUDENT IN AN ORGANIZED HEALTH CARE EDUCATION/TRAINING PROGRAM

## 2025-05-30 PROCEDURE — 36415 COLL VENOUS BLD VENIPUNCTURE: CPT

## 2025-05-30 PROCEDURE — 80069 RENAL FUNCTION PANEL: CPT | Performed by: STUDENT IN AN ORGANIZED HEALTH CARE EDUCATION/TRAINING PROGRAM

## 2025-05-30 RX ORDER — PANTOPRAZOLE SODIUM 40 MG/1
40 TABLET, DELAYED RELEASE ORAL
Qty: 30 TABLET | Refills: 1 | Status: SHIPPED | OUTPATIENT
Start: 2025-05-30 | End: 2025-07-29

## 2025-05-30 RX ORDER — INSULIN GLARGINE-YFGN 100 [IU]/ML
8 INJECTION, SOLUTION SUBCUTANEOUS EVERY MORNING
Start: 2025-05-30 | End: 2025-06-29

## 2025-05-30 RX ORDER — INSULIN LISPRO 100 [IU]/ML
0-10 INJECTION, SOLUTION INTRAVENOUS; SUBCUTANEOUS
Qty: 15 ML | Refills: 1 | Status: SHIPPED | OUTPATIENT
Start: 2025-05-30 | End: 2025-08-13

## 2025-05-30 RX ORDER — SODIUM BICARBONATE 650 MG/1
650 TABLET ORAL DAILY
Start: 2025-05-30 | End: 2025-05-30 | Stop reason: DRUGHIGH

## 2025-05-30 ASSESSMENT — ENCOUNTER SYMPTOMS
FATIGUE: 0
STRIDOR: 0
SHORTNESS OF BREATH: 0
ABDOMINAL PAIN: 0

## 2025-05-30 ASSESSMENT — PAIN SCALES - GENERAL: PAINLEVEL_OUTOF10: 0-NO PAIN

## 2025-05-30 NOTE — TELEPHONE ENCOUNTER
On-Call  Patient called on-call stating [facility] was missing some information to fill his Humalog. After tonight he will be out.  Called [facility] [phone] and spoke to the pharmacists, she wanted to confirm the total maximum dose for filling and billing purposes.  Confirmed dosage and they stated they would be able to fill.  Called patient back, rec VM, that the discrepancy should be resolved and they should be able to fill his insulin and to c/b if he has any other problems.

## 2025-05-30 NOTE — PATIENT INSTRUCTIONS
Medication Changes:  Start sodium bicarbonate 650mg (1 tab) once a day  Restart pantoprazole 40mg every morning  Increase long acting insulin to 8 units every morning  We're ordering the long-acting tacrolimus called Envarsus for you  It's a once a day medication you take in the morning on an empty stomach  Let us know when you pick it up and we'll talk about how to switch over.    Plan:  Drink at least 2.5L of water every day  Labs twice a week  Next clinic appt in 2 weeks

## 2025-05-30 NOTE — PROGRESS NOTES
Subjective   Tanmay Mcneill is a 39 y.o. male who underwent DDKT on 4/19/2025 (Kidney). Here today for follow-up.    Doing well  Incision fully healed.  No drainage    Review of Systems   Constitutional:  Negative for fatigue.   Respiratory:  Negative for shortness of breath and stridor.    Cardiovascular:  Positive for leg swelling.   Gastrointestinal:  Negative for abdominal pain.        Objective   /81 (BP Location: Left arm, Patient Position: Sitting, BP Cuff Size: Large adult)   Pulse 74   Temp 36.2 °C (97.1 °F) (Temporal)   Wt (!) 153 kg (337 lb 9.6 oz)   SpO2 98%   BMI 44.54 kg/m²   Exam  Gen: AAOx3, NAD  Card: Regular rate and rhythm  Resp: sat well room air, equal chest rise  Abd: incision c/d/I, no erythema. +  edema RLQ. No drains. Staples removed  Ext: no lower extremity edema    Lab Results   Component Value Date    CREATININE 3.55 (H) 05/27/2025    K 5.2 05/27/2025    GLUCOSE 237 (H) 05/27/2025    HCT 32.4 (L) 05/27/2025    WBC 9.8 05/27/2025     05/27/2025    CALCIUM 9.0 05/27/2025     Assessment/Plan      ESRD 2/2 DM and HTN     S/p DDKT 4/19/25   DCD donor, KDPI 48%  Full cava used as venous extension 2/2 depth  DGF, slowly improving, Cr continues to decrease  Allosure and DSA  Lasix 80 PO daily continue     Immunosuppression medications review and adjusted to minimize toxicity   PRA 19%  Goal thymo 4.5mg/kg - completed  Tacrolimus - 6/6-- will change to Envarsus for tremor  MMF 1 g BID  Prednisone 5 mg     3. Diabetes  Appreciate endocrine  Sugars well controlled at home  Increased long acting insulin in clinic     4. Ppx  CMV: D-/R+: Acyclovir and prevymis (cost)  EBV: D+/R+  Bactrim    5. HTN  Amlodipine 10 mg  Coreg  25 mg     Follow up 1 week  Labs twice weekly     Donor Kidney Info:  Etiology:  DCD  Risk: no but was lung tx recipient  Terminal Cr  1.08   KDPI: 48 %  PRA: 19 %  CMV: D-/R+  EBV: D+/R+  Toxo: Donor Negative  HCV Ab/MELISSA: Negative  HBcAB: Negative  HBsAg/HBV MELISSA:  Negative

## 2025-05-30 NOTE — TELEPHONE ENCOUNTER
[id number] - [place], [place] - [address] AT [address] [address]  Phone: [phone] Fax: [phone]           insulin lispro (HumaLOG) 100 unit/mL pen      Looking to find out the max number of units per day

## 2025-05-31 ENCOUNTER — HOME CARE VISIT (OUTPATIENT)
Dept: HOME HEALTH SERVICES | Facility: HOME HEALTH | Age: 40
End: 2025-05-31
Payer: MEDICARE

## 2025-05-31 RX ORDER — SODIUM BICARBONATE 650 MG/1
1300 TABLET ORAL 2 TIMES DAILY
Qty: 120 TABLET | Refills: 2 | Status: SHIPPED | OUTPATIENT
Start: 2025-05-31 | End: 2025-08-29

## 2025-06-02 ENCOUNTER — NURSE ONLY (OUTPATIENT)
Facility: HOSPITAL | Age: 40
End: 2025-06-02
Payer: MEDICARE

## 2025-06-02 DIAGNOSIS — Z94.0 KIDNEY REPLACED BY TRANSPLANT (HHS-HCC): ICD-10-CM

## 2025-06-02 LAB
ALBUMIN SERPL BCP-MCNC: 4.2 G/DL (ref 3.4–5)
ANION GAP SERPL CALC-SCNC: 12 MMOL/L (ref 10–20)
BUN SERPL-MCNC: 27 MG/DL (ref 6–23)
CALCIUM SERPL-MCNC: 9.2 MG/DL (ref 8.6–10.6)
CHLORIDE SERPL-SCNC: 108 MMOL/L (ref 98–107)
CO2 SERPL-SCNC: 20 MMOL/L (ref 21–32)
CREAT SERPL-MCNC: 3.12 MG/DL (ref 0.5–1.3)
EGFRCR SERPLBLD CKD-EPI 2021: 25 ML/MIN/1.73M*2
ERYTHROCYTE [DISTWIDTH] IN BLOOD BY AUTOMATED COUNT: 17.3 % (ref 11.5–14.5)
GLUCOSE SERPL-MCNC: 209 MG/DL (ref 74–99)
HCT VFR BLD AUTO: 33.4 % (ref 41–52)
HGB BLD-MCNC: 9.3 G/DL (ref 13.5–17.5)
MAGNESIUM SERPL-MCNC: 1.45 MG/DL (ref 1.6–2.4)
MCH RBC QN AUTO: 23.4 PG (ref 26–34)
MCHC RBC AUTO-ENTMCNC: 27.8 G/DL (ref 32–36)
MCV RBC AUTO: 84 FL (ref 80–100)
NRBC BLD-RTO: 0 /100 WBCS (ref 0–0)
PHOSPHATE SERPL-MCNC: 2.9 MG/DL (ref 2.5–4.9)
PLATELET # BLD AUTO: 227 X10*3/UL (ref 150–450)
POTASSIUM SERPL-SCNC: 5 MMOL/L (ref 3.5–5.3)
RBC # BLD AUTO: 3.98 X10*6/UL (ref 4.5–5.9)
SODIUM SERPL-SCNC: 135 MMOL/L (ref 136–145)
TACROLIMUS BLD-MCNC: 11.8 NG/ML
WBC # BLD AUTO: 8.9 X10*3/UL (ref 4.4–11.3)

## 2025-06-02 PROCEDURE — 85027 COMPLETE CBC AUTOMATED: CPT | Performed by: STUDENT IN AN ORGANIZED HEALTH CARE EDUCATION/TRAINING PROGRAM

## 2025-06-02 PROCEDURE — 83735 ASSAY OF MAGNESIUM: CPT | Performed by: STUDENT IN AN ORGANIZED HEALTH CARE EDUCATION/TRAINING PROGRAM

## 2025-06-02 PROCEDURE — 80197 ASSAY OF TACROLIMUS: CPT | Performed by: STUDENT IN AN ORGANIZED HEALTH CARE EDUCATION/TRAINING PROGRAM

## 2025-06-02 PROCEDURE — 80069 RENAL FUNCTION PANEL: CPT | Performed by: STUDENT IN AN ORGANIZED HEALTH CARE EDUCATION/TRAINING PROGRAM

## 2025-06-02 PROCEDURE — 36415 COLL VENOUS BLD VENIPUNCTURE: CPT

## 2025-06-03 ENCOUNTER — PATIENT MESSAGE (OUTPATIENT)
Facility: HOSPITAL | Age: 40
End: 2025-06-03
Payer: COMMERCIAL

## 2025-06-03 ENCOUNTER — HOME CARE VISIT (OUTPATIENT)
Dept: HOME HEALTH SERVICES | Facility: HOME HEALTH | Age: 40
End: 2025-06-03
Payer: MEDICARE

## 2025-06-03 VITALS — TEMPERATURE: 97 F | DIASTOLIC BLOOD PRESSURE: 81 MMHG | SYSTOLIC BLOOD PRESSURE: 163 MMHG

## 2025-06-03 DIAGNOSIS — E83.42 HYPOMAGNESEMIA: ICD-10-CM

## 2025-06-03 PROCEDURE — G0300 HHS/HOSPICE OF LPN EA 15 MIN: HCPCS | Mod: HHH

## 2025-06-03 RX ORDER — CALCIUM CARBONATE 300MG(750)
400 TABLET,CHEWABLE ORAL
Qty: 30 TABLET | Refills: 1 | Status: SHIPPED | OUTPATIENT
Start: 2025-06-03 | End: 2025-08-02

## 2025-06-03 ASSESSMENT — ENCOUNTER SYMPTOMS
PERSON REPORTING PAIN: PATIENT
CHANGE IN APPETITE: UNCHANGED
DYSPNEA ACTIVITY LEVEL: AFTER AMBULATING 10 - 20 FT
APPETITE LEVEL: GOOD
OCCASIONAL FEELINGS OF UNSTEADINESS: 0
SHORTNESS OF BREATH: 1
DENIES PAIN: 1
HYPERTENSION: 1

## 2025-06-04 DIAGNOSIS — Z94.0 KIDNEY REPLACED BY TRANSPLANT (HHS-HCC): ICD-10-CM

## 2025-06-05 ENCOUNTER — NURSE ONLY (OUTPATIENT)
Facility: HOSPITAL | Age: 40
End: 2025-06-05
Payer: MEDICARE

## 2025-06-05 ENCOUNTER — TELEPHONE (OUTPATIENT)
Facility: HOSPITAL | Age: 40
End: 2025-06-05
Payer: COMMERCIAL

## 2025-06-05 LAB
ALBUMIN SERPL BCP-MCNC: 4.2 G/DL (ref 3.4–5)
ANION GAP SERPL CALC-SCNC: 14 MMOL/L (ref 10–20)
BUN SERPL-MCNC: 30 MG/DL (ref 6–23)
CALCIUM SERPL-MCNC: 9.2 MG/DL (ref 8.6–10.6)
CHLORIDE SERPL-SCNC: 110 MMOL/L (ref 98–107)
CO2 SERPL-SCNC: 18 MMOL/L (ref 21–32)
CREAT SERPL-MCNC: 2.81 MG/DL (ref 0.5–1.3)
EGFRCR SERPLBLD CKD-EPI 2021: 28 ML/MIN/1.73M*2
ERYTHROCYTE [DISTWIDTH] IN BLOOD BY AUTOMATED COUNT: 16.5 % (ref 11.5–14.5)
GLUCOSE SERPL-MCNC: 202 MG/DL (ref 74–99)
HCT VFR BLD AUTO: 32.2 % (ref 41–52)
HGB BLD-MCNC: 9 G/DL (ref 13.5–17.5)
MAGNESIUM SERPL-MCNC: 1.32 MG/DL (ref 1.6–2.4)
MCH RBC QN AUTO: 23.7 PG (ref 26–34)
MCHC RBC AUTO-ENTMCNC: 28 G/DL (ref 32–36)
MCV RBC AUTO: 85 FL (ref 80–100)
NRBC BLD-RTO: 0 /100 WBCS (ref 0–0)
PHOSPHATE SERPL-MCNC: 3 MG/DL (ref 2.5–4.9)
PLATELET # BLD AUTO: 193 X10*3/UL (ref 150–450)
POTASSIUM SERPL-SCNC: 5.3 MMOL/L (ref 3.5–5.3)
RBC # BLD AUTO: 3.79 X10*6/UL (ref 4.5–5.9)
SODIUM SERPL-SCNC: 137 MMOL/L (ref 136–145)
TACROLIMUS BLD-MCNC: 11.5 NG/ML
WBC # BLD AUTO: 7.6 X10*3/UL (ref 4.4–11.3)

## 2025-06-05 PROCEDURE — 80197 ASSAY OF TACROLIMUS: CPT | Performed by: STUDENT IN AN ORGANIZED HEALTH CARE EDUCATION/TRAINING PROGRAM

## 2025-06-05 PROCEDURE — 83735 ASSAY OF MAGNESIUM: CPT | Performed by: STUDENT IN AN ORGANIZED HEALTH CARE EDUCATION/TRAINING PROGRAM

## 2025-06-05 PROCEDURE — 80069 RENAL FUNCTION PANEL: CPT | Performed by: STUDENT IN AN ORGANIZED HEALTH CARE EDUCATION/TRAINING PROGRAM

## 2025-06-05 PROCEDURE — 36415 COLL VENOUS BLD VENIPUNCTURE: CPT

## 2025-06-05 PROCEDURE — 85027 COMPLETE CBC AUTOMATED: CPT | Performed by: STUDENT IN AN ORGANIZED HEALTH CARE EDUCATION/TRAINING PROGRAM

## 2025-06-05 NOTE — TELEPHONE ENCOUNTER
LAILA KIDNEY SURGEON at  8:00 AM (30 min)  Tuesday Randi 10, 2025  Appointment Provider:LAILA ABDOMINAL SURGEON in [place] [address] [address]

## 2025-06-06 ENCOUNTER — PATIENT MESSAGE (OUTPATIENT)
Facility: HOSPITAL | Age: 40
End: 2025-06-06
Payer: COMMERCIAL

## 2025-06-06 DIAGNOSIS — Z94.0 KIDNEY REPLACED BY TRANSPLANT (HHS-HCC): ICD-10-CM

## 2025-06-06 DIAGNOSIS — E83.42 HYPOMAGNESEMIA: ICD-10-CM

## 2025-06-06 DIAGNOSIS — E87.20 METABOLIC ACIDOSIS: ICD-10-CM

## 2025-06-06 RX ORDER — CALCIUM CARBONATE 300MG(750)
400 TABLET,CHEWABLE ORAL 2 TIMES DAILY
Qty: 60 TABLET | Refills: 1 | Status: CANCELLED | OUTPATIENT
Start: 2025-06-06 | End: 2025-08-05

## 2025-06-06 RX ORDER — SODIUM BICARBONATE 650 MG/1
1300 TABLET ORAL 3 TIMES DAILY
Qty: 180 TABLET | Refills: 2 | Status: CANCELLED | OUTPATIENT
Start: 2025-06-06 | End: 2025-09-04

## 2025-06-08 ENCOUNTER — HOME CARE VISIT (OUTPATIENT)
Dept: HOME HEALTH SERVICES | Facility: HOME HEALTH | Age: 40
End: 2025-06-08
Payer: MEDICARE

## 2025-06-08 ASSESSMENT — ACTIVITIES OF DAILY LIVING (ADL)
HOME_HEALTH_OASIS: 01
AMBULATION ASSISTANCE: 1
AMBULATION ASSISTANCE: SUPERVISION
OASIS_M1830: 00

## 2025-06-08 ASSESSMENT — PATIENT HEALTH QUESTIONNAIRE - PHQ9: PATIENT UNABLE TO COMPLETE PHQ: PATIENT UNAVAILABLE

## 2025-06-09 DIAGNOSIS — Z94.0 KIDNEY REPLACED BY TRANSPLANT (HHS-HCC): ICD-10-CM

## 2025-06-10 ENCOUNTER — APPOINTMENT (OUTPATIENT)
Facility: HOSPITAL | Age: 40
End: 2025-06-10
Payer: MEDICARE

## 2025-06-10 ENCOUNTER — OFFICE VISIT (OUTPATIENT)
Facility: HOSPITAL | Age: 40
End: 2025-06-10
Payer: MEDICARE

## 2025-06-10 ENCOUNTER — HOSPITAL ENCOUNTER (OUTPATIENT)
Dept: RADIOLOGY | Facility: HOSPITAL | Age: 40
Discharge: HOME | End: 2025-06-10
Payer: MEDICARE

## 2025-06-10 VITALS
TEMPERATURE: 97.1 F | SYSTOLIC BLOOD PRESSURE: 165 MMHG | OXYGEN SATURATION: 99 % | BODY MASS INDEX: 44.49 KG/M2 | WEIGHT: 315 LBS | HEART RATE: 83 BPM | DIASTOLIC BLOOD PRESSURE: 76 MMHG

## 2025-06-10 DIAGNOSIS — E87.20 METABOLIC ACIDOSIS: ICD-10-CM

## 2025-06-10 DIAGNOSIS — Z94.0 KIDNEY REPLACED BY TRANSPLANT (HHS-HCC): ICD-10-CM

## 2025-06-10 DIAGNOSIS — E83.42 HYPOMAGNESEMIA: ICD-10-CM

## 2025-06-10 DIAGNOSIS — K21.9 GASTROESOPHAGEAL REFLUX DISEASE, UNSPECIFIED WHETHER ESOPHAGITIS PRESENT: ICD-10-CM

## 2025-06-10 DIAGNOSIS — Z79.899 IMMUNOSUPPRESSIVE MANAGEMENT ENCOUNTER FOLLOWING KIDNEY TRANSPLANT: ICD-10-CM

## 2025-06-10 DIAGNOSIS — Z94.0 IMMUNOSUPPRESSIVE MANAGEMENT ENCOUNTER FOLLOWING KIDNEY TRANSPLANT: ICD-10-CM

## 2025-06-10 DIAGNOSIS — R19.7 DIARRHEA, UNSPECIFIED TYPE: ICD-10-CM

## 2025-06-10 LAB
ALBUMIN SERPL BCP-MCNC: 4.7 G/DL (ref 3.4–5)
ANION GAP SERPL CALC-SCNC: 16 MMOL/L (ref 10–20)
BUN SERPL-MCNC: 26 MG/DL (ref 6–23)
CALCIUM SERPL-MCNC: 9.4 MG/DL (ref 8.6–10.6)
CHLORIDE SERPL-SCNC: 107 MMOL/L (ref 98–107)
CO2 SERPL-SCNC: 18 MMOL/L (ref 21–32)
CREAT SERPL-MCNC: 2.55 MG/DL (ref 0.5–1.3)
EGFRCR SERPLBLD CKD-EPI 2021: 32 ML/MIN/1.73M*2
ERYTHROCYTE [DISTWIDTH] IN BLOOD BY AUTOMATED COUNT: 16.4 % (ref 11.5–14.5)
GLUCOSE SERPL-MCNC: 304 MG/DL (ref 74–99)
HCT VFR BLD AUTO: 34.8 % (ref 41–52)
HGB BLD-MCNC: 9.9 G/DL (ref 13.5–17.5)
MAGNESIUM SERPL-MCNC: 1.47 MG/DL (ref 1.6–2.4)
MCH RBC QN AUTO: 23.7 PG (ref 26–34)
MCHC RBC AUTO-ENTMCNC: 28.4 G/DL (ref 32–36)
MCV RBC AUTO: 83 FL (ref 80–100)
NRBC BLD-RTO: 0 /100 WBCS (ref 0–0)
PHOSPHATE SERPL-MCNC: 2.5 MG/DL (ref 2.5–4.9)
PLATELET # BLD AUTO: 272 X10*3/UL (ref 150–450)
POTASSIUM SERPL-SCNC: 5.5 MMOL/L (ref 3.5–5.3)
RBC # BLD AUTO: 4.18 X10*6/UL (ref 4.5–5.9)
SODIUM SERPL-SCNC: 135 MMOL/L (ref 136–145)
TACROLIMUS BLD-MCNC: 8.4 NG/ML
WBC # BLD AUTO: 8.8 X10*3/UL (ref 4.4–11.3)

## 2025-06-10 PROCEDURE — 99214 OFFICE O/P EST MOD 30 MIN: CPT | Mod: 24,25 | Performed by: SURGERY

## 2025-06-10 PROCEDURE — 83735 ASSAY OF MAGNESIUM: CPT | Performed by: STUDENT IN AN ORGANIZED HEALTH CARE EDUCATION/TRAINING PROGRAM

## 2025-06-10 PROCEDURE — 3078F DIAST BP <80 MM HG: CPT | Performed by: SURGERY

## 2025-06-10 PROCEDURE — 74176 CT ABD & PELVIS W/O CONTRAST: CPT

## 2025-06-10 PROCEDURE — 99214 OFFICE O/P EST MOD 30 MIN: CPT | Performed by: SURGERY

## 2025-06-10 PROCEDURE — RXMED WILLOW AMBULATORY MEDICATION CHARGE

## 2025-06-10 PROCEDURE — 36415 COLL VENOUS BLD VENIPUNCTURE: CPT

## 2025-06-10 PROCEDURE — 3077F SYST BP >= 140 MM HG: CPT | Performed by: SURGERY

## 2025-06-10 PROCEDURE — 85027 COMPLETE CBC AUTOMATED: CPT | Performed by: STUDENT IN AN ORGANIZED HEALTH CARE EDUCATION/TRAINING PROGRAM

## 2025-06-10 PROCEDURE — 80197 ASSAY OF TACROLIMUS: CPT | Performed by: STUDENT IN AN ORGANIZED HEALTH CARE EDUCATION/TRAINING PROGRAM

## 2025-06-10 PROCEDURE — 80069 RENAL FUNCTION PANEL: CPT | Performed by: STUDENT IN AN ORGANIZED HEALTH CARE EDUCATION/TRAINING PROGRAM

## 2025-06-10 RX ORDER — PANTOPRAZOLE SODIUM 40 MG/1
40 TABLET, DELAYED RELEASE ORAL 2 TIMES DAILY
Qty: 60 TABLET | Refills: 1 | Status: SHIPPED | OUTPATIENT
Start: 2025-06-10 | End: 2025-08-09

## 2025-06-10 RX ORDER — ATOVAQUONE 750 MG/5ML
1500 SUSPENSION ORAL DAILY
Qty: 300 ML | Refills: 5 | Status: SHIPPED | OUTPATIENT
Start: 2025-06-10 | End: 2025-12-07

## 2025-06-10 RX ORDER — CALCIUM CARBONATE 300MG(750)
400 TABLET,CHEWABLE ORAL 2 TIMES DAILY
Qty: 60 TABLET | Refills: 1 | Status: SHIPPED | OUTPATIENT
Start: 2025-06-10 | End: 2025-08-09

## 2025-06-10 RX ORDER — MYCOPHENOLIC ACID 180 MG/1
720 TABLET, DELAYED RELEASE ORAL 2 TIMES DAILY
Qty: 240 TABLET | Refills: 11 | Status: SHIPPED | OUTPATIENT
Start: 2025-06-10 | End: 2026-06-10

## 2025-06-10 RX ORDER — SODIUM BICARBONATE 650 MG/1
1300 TABLET ORAL 3 TIMES DAILY
Qty: 180 TABLET | Refills: 2 | Status: SHIPPED | OUTPATIENT
Start: 2025-06-10 | End: 2025-09-08

## 2025-06-10 ASSESSMENT — PAIN SCALES - GENERAL: PAINLEVEL_OUTOF10: 0-NO PAIN

## 2025-06-10 NOTE — PROGRESS NOTES
Tanmay Mcneill is a 39 y.o. male POD#52 from DDKT from a DCD donor.    - reflux sx  - diarrhea 4-5X daily  - feels like seroma has re-accumulated    Objective   Gen: A+OX3; NAD  HEENT: PERRL, sclera anicteric, MMM  Cardiac: RRR  Chest: Normal inspiratory effort  Abdomen: S/NT/+seroma. Incision C/D/I.  Ext: No LE edema    Assessment/Plan   ESRD 2/2 DM and HTN     S/p DDKT 4/19/25   DCD donor, KDPI 48%/PRA 19  Full cava used as venous extension 2/2 depth  DGF, slowly improving  Has not gotten bx yet - deep kidney  Allosure repeat today (negative 5/12)  DSA negative 5/27  Lasix 80 PO daily     Seroma seems back: non-contrast CT A/P, likely need IR drain  Hyperkalemia: stop bactrim, convert to atovaquone     Immunosuppression   PRA 19%  Induction: Thymo 4.5mg/kg  Envarsus 10 mg  Convert to Myfortic 720 mg BID (diarrhea)  Prednisone 5 mg     3. Diabetes  Appreciate endocrine  Sugars well controlled at home     4. Ppx  CMV: D-/R+: Acyclovir and prevymis (cost)  EBV: D+/R+  Stop bactrim, switch to atovaquone  Protonix increase to BID     5. HTN  Amlodipine 10 mg  Coreg  12.5 mg    6. Diarrhea      Convert myfortic       Stool studies; if neg can do prn Imodium     Follow up 3 weeks  Labs twice weekly    I spent 30 minutes in the professional and overall care of this patient, including immunosuppression management.    Joanna Potts MD, PHD, MPH, FACS  Chief, Transplant and Hepatobiliary Surgery

## 2025-06-10 NOTE — LETTER
06/10/25    To Whom It May Concern:    Tanmay Mcneill has been under our care at [facility] as a result of having abdominal surgery on 4/19/2025. Tanmay is permitted to return to work on 6/17/2025 with the following restrictions: no lifting >15 lbs, must have frequent bathroom breaks as needed. If you have any questions and/or concerns, please feel free to contact me.    Thank you,     Joanna Potts MD  P. [phone]  F. [phone]

## 2025-06-10 NOTE — PATIENT INSTRUCTIONS
Take Envarsus 10 mg total every day with your morning medications  Low potassium diet - limit bananas, avocados, potatoes and tomatoes  Stop Bactrim  Start Atovaquone 10 mL daily in the morning - this will replace Bactrim  Increase pantoprazole to twice daily  Please  Myfortic today and start it tonight (720 mg every 12 hours) & stop Cellcept (your current Mycophenolate)  Labs twice per week  RTC 3 weeks

## 2025-06-11 ENCOUNTER — TELEPHONE (OUTPATIENT)
Facility: HOSPITAL | Age: 40
End: 2025-06-11
Payer: COMMERCIAL

## 2025-06-11 DIAGNOSIS — Z94.0 KIDNEY REPLACED BY TRANSPLANT (HHS-HCC): ICD-10-CM

## 2025-06-11 NOTE — TELEPHONE ENCOUNTER
Dr Gonzalez reviewed CT results - Needs IR drainage of large subcutaneous collection Wed or Thurs; if calls with fever or any sx, then direct admit     Call placed to patient, no answer. LMTCB.  IR order placed. Direct message sent to IR schedulers.  Mychart message sent to patient.

## 2025-06-11 NOTE — TELEPHONE ENCOUNTER
IR able to accommodate appt at 0700 tomorrow morning for drainage of fluid collection and drain placement.     Spoke with patient - he agreed to appointment. Patient given instructions per the IR charge nurse.    Dr Gonzalez updated.

## 2025-06-12 ENCOUNTER — PHARMACY VISIT (OUTPATIENT)
Dept: PHARMACY | Facility: CLINIC | Age: 40
End: 2025-06-12
Payer: COMMERCIAL

## 2025-06-12 ENCOUNTER — TELEPHONE (OUTPATIENT)
Facility: HOSPITAL | Age: 40
End: 2025-06-12
Payer: COMMERCIAL

## 2025-06-12 ENCOUNTER — HOSPITAL ENCOUNTER (OUTPATIENT)
Dept: RADIOLOGY | Facility: HOSPITAL | Age: 40
Discharge: HOME | End: 2025-06-12
Payer: MEDICARE

## 2025-06-12 VITALS
OXYGEN SATURATION: 100 % | SYSTOLIC BLOOD PRESSURE: 135 MMHG | TEMPERATURE: 97.7 F | DIASTOLIC BLOOD PRESSURE: 57 MMHG | RESPIRATION RATE: 16 BRPM | HEART RATE: 70 BPM

## 2025-06-12 DIAGNOSIS — S30.1XXA ABDOMINAL WALL SEROMA, INITIAL ENCOUNTER: Primary | ICD-10-CM

## 2025-06-12 DIAGNOSIS — E87.5 HYPERKALEMIA: ICD-10-CM

## 2025-06-12 DIAGNOSIS — Z94.0 KIDNEY REPLACED BY TRANSPLANT (HHS-HCC): ICD-10-CM

## 2025-06-12 LAB
ALBUMIN SERPL BCP-MCNC: 4.2 G/DL (ref 3.4–5)
ANION GAP SERPL CALC-SCNC: 13 MMOL/L (ref 10–20)
BUN SERPL-MCNC: 28 MG/DL (ref 6–23)
CALCIUM SERPL-MCNC: 9.2 MG/DL (ref 8.6–10.6)
CHLORIDE SERPL-SCNC: 108 MMOL/L (ref 98–107)
CO2 SERPL-SCNC: 19 MMOL/L (ref 21–32)
CREAT SERPL-MCNC: 2.83 MG/DL (ref 0.5–1.3)
EGFRCR SERPLBLD CKD-EPI 2021: 28 ML/MIN/1.73M*2
ERYTHROCYTE [DISTWIDTH] IN BLOOD BY AUTOMATED COUNT: 15.9 % (ref 11.5–14.5)
GLUCOSE SERPL-MCNC: 256 MG/DL (ref 74–99)
HCT VFR BLD AUTO: 30 % (ref 41–52)
HGB BLD-MCNC: 9 G/DL (ref 13.5–17.5)
MAGNESIUM SERPL-MCNC: 1.49 MG/DL (ref 1.6–2.4)
MCH RBC QN AUTO: 23.7 PG (ref 26–34)
MCHC RBC AUTO-ENTMCNC: 30 G/DL (ref 32–36)
MCV RBC AUTO: 79 FL (ref 80–100)
NRBC BLD-RTO: 0 /100 WBCS (ref 0–0)
PHOSPHATE SERPL-MCNC: 2.8 MG/DL (ref 2.5–4.9)
PLATELET # BLD AUTO: 256 X10*3/UL (ref 150–450)
POTASSIUM SERPL-SCNC: 5.8 MMOL/L (ref 3.5–5.3)
RBC # BLD AUTO: 3.8 X10*6/UL (ref 4.5–5.9)
SODIUM SERPL-SCNC: 134 MMOL/L (ref 136–145)
TACROLIMUS BLD-MCNC: 6.8 NG/ML
WBC # BLD AUTO: 9.7 X10*3/UL (ref 4.4–11.3)

## 2025-06-12 PROCEDURE — 2500000004 HC RX 250 GENERAL PHARMACY W/ HCPCS (ALT 636 FOR OP/ED): Performed by: RADIOLOGY

## 2025-06-12 PROCEDURE — 85027 COMPLETE CBC AUTOMATED: CPT | Performed by: STUDENT IN AN ORGANIZED HEALTH CARE EDUCATION/TRAINING PROGRAM

## 2025-06-12 PROCEDURE — 7100000010 HC PHASE TWO TIME - EACH INCREMENTAL 1 MINUTE

## 2025-06-12 PROCEDURE — 7100000009 HC PHASE TWO TIME - INITIAL BASE CHARGE

## 2025-06-12 PROCEDURE — 83735 ASSAY OF MAGNESIUM: CPT | Performed by: STUDENT IN AN ORGANIZED HEALTH CARE EDUCATION/TRAINING PROGRAM

## 2025-06-12 PROCEDURE — 49405 IMAGE CATH FLUID COLXN VISC: CPT

## 2025-06-12 PROCEDURE — 80069 RENAL FUNCTION PANEL: CPT | Performed by: STUDENT IN AN ORGANIZED HEALTH CARE EDUCATION/TRAINING PROGRAM

## 2025-06-12 PROCEDURE — 2720000007 HC OR 272 NO HCPCS

## 2025-06-12 PROCEDURE — C1729 CATH, DRAINAGE: HCPCS

## 2025-06-12 PROCEDURE — 10030 IMG GID FLU COLL DRG SFT TIS: CPT | Performed by: RADIOLOGY

## 2025-06-12 PROCEDURE — 36415 COLL VENOUS BLD VENIPUNCTURE: CPT

## 2025-06-12 PROCEDURE — 99152 MOD SED SAME PHYS/QHP 5/>YRS: CPT | Performed by: RADIOLOGY

## 2025-06-12 PROCEDURE — 80197 ASSAY OF TACROLIMUS: CPT | Performed by: STUDENT IN AN ORGANIZED HEALTH CARE EDUCATION/TRAINING PROGRAM

## 2025-06-12 RX ORDER — MIDAZOLAM HYDROCHLORIDE 1 MG/ML
INJECTION INTRAMUSCULAR; INTRAVENOUS
Status: COMPLETED | OUTPATIENT
Start: 2025-06-12 | End: 2025-06-12

## 2025-06-12 RX ORDER — FENTANYL CITRATE 50 UG/ML
INJECTION, SOLUTION INTRAMUSCULAR; INTRAVENOUS
Status: COMPLETED | OUTPATIENT
Start: 2025-06-12 | End: 2025-06-12

## 2025-06-12 RX ADMIN — MIDAZOLAM HYDROCHLORIDE 1 MG: 2 INJECTION, SOLUTION INTRAMUSCULAR; INTRAVENOUS at 09:13

## 2025-06-12 RX ADMIN — FENTANYL CITRATE 50 MCG: 50 INJECTION, SOLUTION INTRAMUSCULAR; INTRAVENOUS at 09:18

## 2025-06-12 RX ADMIN — MIDAZOLAM HYDROCHLORIDE 1 MG: 2 INJECTION, SOLUTION INTRAMUSCULAR; INTRAVENOUS at 09:18

## 2025-06-12 RX ADMIN — FENTANYL CITRATE 50 MCG: 50 INJECTION, SOLUTION INTRAMUSCULAR; INTRAVENOUS at 09:13

## 2025-06-12 ASSESSMENT — PAIN SCALES - GENERAL
PAINLEVEL_OUTOF10: 0 - NO PAIN

## 2025-06-12 ASSESSMENT — PAIN - FUNCTIONAL ASSESSMENT
PAIN_FUNCTIONAL_ASSESSMENT: 0-10

## 2025-06-12 NOTE — PRE-PROCEDURE NOTE
Interventional Radiology Preprocedure Note    Procedure: US-guided anterior abdominal wall aspiration/drainage of fluid collection     Indication for procedure: The encounter diagnosis was Kidney replaced by transplant ([facility]HS-HCC).    Relevant review of systems: NA    Relevant Labs:   Lab Results   Component Value Date    CREATININE 2.55 (H) 06/10/2025    EGFR 32 (L) 06/10/2025    INR 1.0 04/19/2025    PROTIME 11.4 04/19/2025       Planned Sedation/Anesthesia: Moderate    Airway assessment: N/A    Directed physical examination:    A&Ox3     Mallampati: N/A    ASA Score: ASA 2 - Patient with mild systemic disease with no functional limitations    Benefits, risks and alternatives of procedure and planned sedation have been discussed with the patient and/or their representative. All questions answered and they agree to proceed.     Jackie Rodríguez DO, PGY-3   Diagnostic Radiology   [facility]

## 2025-06-12 NOTE — DISCHARGE INSTRUCTIONS
You received moderate sedation:  - Do not drive a car, or operate any machinery or power tools of any kind for 24 hours.   - Do not drink any alcoholic drinks for 24 hours.   - Do not take any over the counter medications that may cause drowsiness for 24 hours.  - Do not make any important decisions or sign any legal documents for 24 hours.  - You need to have a responsible adult accompany you home.  - You may resume your normal diet.  - We strongly suggest that a responsible adult be with you for the rest of the day and also during the night. This is for your protection and safety.     For questions related to your procedure:  Please call [phone] between the hours of 7:00am-5:00pm Monday through Friday.  Please call [phone] after 5:00pm and on weekends and holidays.     In the event of an emergency call [phone] or go to your nearest emergency room.

## 2025-06-12 NOTE — TELEPHONE ENCOUNTER
Labs reviewed with Dr Hernandez - elevated K & Cr specifically noted, tacrolimus pending. Patient to start lokelma 10g daily with lunch. Patient updated.     Patient confirmed IR procedure went well.

## 2025-06-12 NOTE — POST-PROCEDURE NOTE
Interventional Radiology Brief Postprocedure Note    Attending: Dr. Tran     Assistant: Dr. Marcos DO, PGY-3    Diagnosis: Abdominal wall post-surgical seroma     Description of procedure: Us-guided aspiration and 8 F percutaneous drain placement into the abdominal wall seroma.     450 ml of serosanguinous fluid was drained from the collection.     Pigtail catheter was connected to a BRETT drain. Catheter was secured with a StayFix.     Anesthesia:  MAC Moderate    Complications: None    Estimated Blood Loss: minimal    Medications (Filter: Administrations occurring from 0908 to 0922 on 06/12/25) As of 06/12/25 0922      fentaNYL PF (Sublimaze) injection (mcg) Total dose:  100 mcg      Date/Time Rate/Dose/Volume Action       06/12/25 0913 50 mcg Given      0918 50 mcg Given               midazolam (Versed) injection (mg) Total dose:  2 mg      Date/Time Rate/Dose/Volume Action       06/12/25 0913 1 mg Given      0918 1 mg Given                   No specimens sent to the lab.    See detailed result report with images in PACS.    The patient tolerated the procedure well without incident or complication and is in stable condition.     PLAN:   Follow-up early next week for drain removal and possible sclerotherapy to the collection.     Jackie Rodríguez DO, PGY-3   Diagnostic Radiology   [facility]

## 2025-06-13 DIAGNOSIS — Z94.0 KIDNEY REPLACED BY TRANSPLANT (HHS-HCC): ICD-10-CM

## 2025-06-16 ENCOUNTER — NURSE ONLY (OUTPATIENT)
Facility: HOSPITAL | Age: 40
End: 2025-06-16
Payer: MEDICARE

## 2025-06-16 DIAGNOSIS — Z94.0 KIDNEY REPLACED BY TRANSPLANT (HHS-HCC): ICD-10-CM

## 2025-06-16 LAB
ALBUMIN SERPL BCP-MCNC: 4.2 G/DL (ref 3.4–5)
ANION GAP SERPL CALC-SCNC: 14 MMOL/L (ref 10–20)
BUN SERPL-MCNC: 27 MG/DL (ref 6–23)
CALCIUM SERPL-MCNC: 9.3 MG/DL (ref 8.6–10.6)
CHLORIDE SERPL-SCNC: 109 MMOL/L (ref 98–107)
CO2 SERPL-SCNC: 18 MMOL/L (ref 21–32)
CREAT SERPL-MCNC: 2.54 MG/DL (ref 0.5–1.3)
EGFRCR SERPLBLD CKD-EPI 2021: 32 ML/MIN/1.73M*2
ERYTHROCYTE [DISTWIDTH] IN BLOOD BY AUTOMATED COUNT: 15.8 % (ref 11.5–14.5)
GLUCOSE SERPL-MCNC: 157 MG/DL (ref 74–99)
HCT VFR BLD AUTO: 31.7 % (ref 41–52)
HGB BLD-MCNC: 9.2 G/DL (ref 13.5–17.5)
MAGNESIUM SERPL-MCNC: 1.6 MG/DL (ref 1.6–2.4)
MCH RBC QN AUTO: 23.9 PG (ref 26–34)
MCHC RBC AUTO-ENTMCNC: 29 G/DL (ref 32–36)
MCV RBC AUTO: 82 FL (ref 80–100)
NRBC BLD-RTO: 0 /100 WBCS (ref 0–0)
PHOSPHATE SERPL-MCNC: 2.8 MG/DL (ref 2.5–4.9)
PLATELET # BLD AUTO: 266 X10*3/UL (ref 150–450)
POTASSIUM SERPL-SCNC: 4.7 MMOL/L (ref 3.5–5.3)
RBC # BLD AUTO: 3.85 X10*6/UL (ref 4.5–5.9)
SODIUM SERPL-SCNC: 136 MMOL/L (ref 136–145)
TACROLIMUS BLD-MCNC: 14.2 NG/ML
WBC # BLD AUTO: 8.3 X10*3/UL (ref 4.4–11.3)

## 2025-06-16 PROCEDURE — 83735 ASSAY OF MAGNESIUM: CPT | Performed by: STUDENT IN AN ORGANIZED HEALTH CARE EDUCATION/TRAINING PROGRAM

## 2025-06-16 PROCEDURE — 85027 COMPLETE CBC AUTOMATED: CPT | Performed by: STUDENT IN AN ORGANIZED HEALTH CARE EDUCATION/TRAINING PROGRAM

## 2025-06-16 PROCEDURE — 80197 ASSAY OF TACROLIMUS: CPT | Performed by: STUDENT IN AN ORGANIZED HEALTH CARE EDUCATION/TRAINING PROGRAM

## 2025-06-16 PROCEDURE — 36415 COLL VENOUS BLD VENIPUNCTURE: CPT

## 2025-06-16 PROCEDURE — 80069 RENAL FUNCTION PANEL: CPT | Performed by: STUDENT IN AN ORGANIZED HEALTH CARE EDUCATION/TRAINING PROGRAM

## 2025-06-18 ENCOUNTER — HOSPITAL ENCOUNTER (OUTPATIENT)
Dept: RADIOLOGY | Facility: HOSPITAL | Age: 40
Discharge: HOME | End: 2025-06-18
Payer: MEDICARE

## 2025-06-18 ENCOUNTER — PATIENT MESSAGE (OUTPATIENT)
Facility: HOSPITAL | Age: 40
End: 2025-06-18
Payer: MEDICARE

## 2025-06-18 VITALS
TEMPERATURE: 97.2 F | SYSTOLIC BLOOD PRESSURE: 160 MMHG | OXYGEN SATURATION: 99 % | DIASTOLIC BLOOD PRESSURE: 80 MMHG | RESPIRATION RATE: 18 BRPM | HEART RATE: 61 BPM

## 2025-06-18 DIAGNOSIS — D63.1 ANEMIA DUE TO STAGE 5 CHRONIC KIDNEY DISEASE, NOT ON CHRONIC DIALYSIS: ICD-10-CM

## 2025-06-18 DIAGNOSIS — E83.42 HYPOMAGNESEMIA: ICD-10-CM

## 2025-06-18 DIAGNOSIS — N18.5 ANEMIA DUE TO STAGE 5 CHRONIC KIDNEY DISEASE, NOT ON CHRONIC DIALYSIS: ICD-10-CM

## 2025-06-18 DIAGNOSIS — Z94.0 KIDNEY REPLACED BY TRANSPLANT (HHS-HCC): ICD-10-CM

## 2025-06-18 DIAGNOSIS — N18.6 ESRD (END STAGE RENAL DISEASE) (MULTI): ICD-10-CM

## 2025-06-18 DIAGNOSIS — S30.1XXA ABDOMINAL WALL SEROMA, INITIAL ENCOUNTER: ICD-10-CM

## 2025-06-18 PROCEDURE — 99152 MOD SED SAME PHYS/QHP 5/>YRS: CPT

## 2025-06-18 PROCEDURE — 99152 MOD SED SAME PHYS/QHP 5/>YRS: CPT | Performed by: RADIOLOGY

## 2025-06-18 PROCEDURE — 49185 SCLEROTX FLUID COLLECTION: CPT

## 2025-06-18 PROCEDURE — 7100000009 HC PHASE TWO TIME - INITIAL BASE CHARGE

## 2025-06-18 PROCEDURE — 7100000010 HC PHASE TWO TIME - EACH INCREMENTAL 1 MINUTE

## 2025-06-18 PROCEDURE — 2500000004 HC RX 250 GENERAL PHARMACY W/ HCPCS (ALT 636 FOR OP/ED): Performed by: RADIOLOGY

## 2025-06-18 RX ORDER — FENTANYL CITRATE 50 UG/ML
INJECTION, SOLUTION INTRAMUSCULAR; INTRAVENOUS
Status: COMPLETED | OUTPATIENT
Start: 2025-06-18 | End: 2025-06-18

## 2025-06-18 RX ORDER — MIDAZOLAM HYDROCHLORIDE 1 MG/ML
INJECTION INTRAMUSCULAR; INTRAVENOUS
Status: COMPLETED | OUTPATIENT
Start: 2025-06-18 | End: 2025-06-18

## 2025-06-18 RX ADMIN — FENTANYL CITRATE 50 MCG: 50 INJECTION, SOLUTION INTRAMUSCULAR; INTRAVENOUS at 10:02

## 2025-06-18 RX ADMIN — FENTANYL CITRATE 50 MCG: 50 INJECTION, SOLUTION INTRAMUSCULAR; INTRAVENOUS at 09:50

## 2025-06-18 RX ADMIN — MIDAZOLAM HYDROCHLORIDE 1 MG: 2 INJECTION, SOLUTION INTRAMUSCULAR; INTRAVENOUS at 10:02

## 2025-06-18 RX ADMIN — MIDAZOLAM HYDROCHLORIDE 1 MG: 2 INJECTION, SOLUTION INTRAMUSCULAR; INTRAVENOUS at 09:50

## 2025-06-18 ASSESSMENT — PAIN SCALES - GENERAL
PAINLEVEL_OUTOF10: 0 - NO PAIN

## 2025-06-18 ASSESSMENT — PAIN - FUNCTIONAL ASSESSMENT
PAIN_FUNCTIONAL_ASSESSMENT: 0-10

## 2025-06-18 NOTE — POST-PROCEDURE NOTE
Interventional Radiology Brief Postprocedure Note    Attending: Bernice Tran MD    Assistant: Justino Cano MD    Diagnosis:   1. Abdominal wall seroma, initial encounter  CT guided aspiration of abscess, hematoma, cyst    CT guided aspiration of abscess, hematoma, cyst    CANCELED: US guided abscess fluid collection drainage    CANCELED: US guided abscess fluid collection drainage            Description of procedure: Successful drainage and doxycycline sclerotherapy of abdominal wall seroma.  Please see radiology report for full details.      Anesthesia:  MAC Moderate    Complications: None    Estimated Blood Loss: minimal    Medications (Filter: Administrations occurring from 0939 to 1012 on 06/18/25) As of 06/18/25 1012      fentaNYL PF (Sublimaze) injection (mcg) Total dose:  100 mcg      Date/Time Rate/Dose/Volume Action       06/18/25  0950 50 mcg Given      1002 50 mcg Given               midazolam (Versed) injection (mg) Total dose:  2 mg      Date/Time Rate/Dose/Volume Action       06/18/25  0950 1 mg Given      1002 1 mg Given                   No specimens collected      See detailed result report with images in PACS.    The patient tolerated the procedure well without incident or complication and is in stable condition.

## 2025-06-18 NOTE — PRE-PROCEDURE NOTE
Interventional Radiology Preprocedure Note    Indication for procedure: The encounter diagnosis was Abdominal wall seroma, initial encounter.    Relevant review of systems: NA    Relevant Labs:   Lab Results   Component Value Date    CREATININE 2.54 (H) 06/16/2025    EGFR 32 (L) 06/16/2025    INR 1.0 04/19/2025    PROTIME 11.4 04/19/2025       Planned Sedation/Anesthesia: Moderate    Airway assessment: normal    Directed physical examination:    No focal findings    Mallampati: II (hard and soft palate, upper portion of tonsils and uvula visible)    ASA Score: ASA 2 - Patient with mild systemic disease with no functional limitations    Benefits, risks and alternatives of procedure and planned sedation have been discussed with the patient and/or their representative. All questions answered and they agree to proceed.

## 2025-06-18 NOTE — DISCHARGE INSTRUCTIONS
Discharge information    Ok to remove dressing on 6/19/25 at 1130am.  Ok to shower on 6/19/25, no baths, jacuzzis, or swimming. Do not get submerged in a body of water (leads to increased risk of infection.)  Ok to keep open until healed. Healing is when a scab is created and falls off, usually within 5-10 days.     Look for signs and symptoms of infection:  Including: redness, swelling, discharge such as pus, or odor from site, fever > 100.5*F.    Look for bleeding from site:  If bleeding occurs hold pressure for 5 minutes with paper towel, check site if still bleeding hold for 5 more minutes  If site continues to bleed after 10 minutes call 911.    You received moderate sedation:  - Do not drive a car, or operate any machinery or power tools of any kind.  - Do not drink any alcoholic drinks.  - Do not take any over the counter medications that may cause drowsiness.  - Do not make any important decisions or sign any legal documents.  - You need to have a responsible adult accompany you home.  - You may resume your normal diet.  - We strongly suggest that a responsible adult be with you for the rest of the day and also during the night. This is for your protection and safety.     For questions related to your procedure:  Please call [phone] between the hours of 7:00am-5:00pm Monday through Friday.  Please call [phone] for Resident on call after 5:00pm weeknights, on weekends, and holidays.     In the event of an emergency call 911 or go to your nearest emergency room.

## 2025-06-20 ENCOUNTER — NURSE ONLY (OUTPATIENT)
Facility: HOSPITAL | Age: 40
End: 2025-06-20
Payer: MEDICARE

## 2025-06-20 DIAGNOSIS — Z94.0 KIDNEY REPLACED BY TRANSPLANT (HHS-HCC): ICD-10-CM

## 2025-06-20 LAB
ALBUMIN SERPL BCP-MCNC: 4.1 G/DL (ref 3.4–5)
ANION GAP SERPL CALC-SCNC: 13 MMOL/L (ref 10–20)
BUN SERPL-MCNC: 23 MG/DL (ref 6–23)
CALCIUM SERPL-MCNC: 9.4 MG/DL (ref 8.6–10.6)
CHLORIDE SERPL-SCNC: 109 MMOL/L (ref 98–107)
CO2 SERPL-SCNC: 20 MMOL/L (ref 21–32)
CREAT SERPL-MCNC: 2.11 MG/DL (ref 0.5–1.3)
EGFRCR SERPLBLD CKD-EPI 2021: 40 ML/MIN/1.73M*2
ERYTHROCYTE [DISTWIDTH] IN BLOOD BY AUTOMATED COUNT: 15.5 % (ref 11.5–14.5)
GLUCOSE SERPL-MCNC: 175 MG/DL (ref 74–99)
HCT VFR BLD AUTO: 30.5 % (ref 41–52)
HGB BLD-MCNC: 8.6 G/DL (ref 13.5–17.5)
MAGNESIUM SERPL-MCNC: 1.36 MG/DL (ref 1.6–2.4)
MCH RBC QN AUTO: 23.1 PG (ref 26–34)
MCHC RBC AUTO-ENTMCNC: 28.2 G/DL (ref 32–36)
MCV RBC AUTO: 82 FL (ref 80–100)
NRBC BLD-RTO: 0 /100 WBCS (ref 0–0)
PHOSPHATE SERPL-MCNC: 2.7 MG/DL (ref 2.5–4.9)
PLATELET # BLD AUTO: 250 X10*3/UL (ref 150–450)
POTASSIUM SERPL-SCNC: 5.1 MMOL/L (ref 3.5–5.3)
RBC # BLD AUTO: 3.72 X10*6/UL (ref 4.5–5.9)
SODIUM SERPL-SCNC: 137 MMOL/L (ref 136–145)
TACROLIMUS BLD-MCNC: 6.7 NG/ML
WBC # BLD AUTO: 9.2 X10*3/UL (ref 4.4–11.3)

## 2025-06-20 PROCEDURE — 83735 ASSAY OF MAGNESIUM: CPT | Performed by: STUDENT IN AN ORGANIZED HEALTH CARE EDUCATION/TRAINING PROGRAM

## 2025-06-20 PROCEDURE — 80197 ASSAY OF TACROLIMUS: CPT | Performed by: STUDENT IN AN ORGANIZED HEALTH CARE EDUCATION/TRAINING PROGRAM

## 2025-06-20 PROCEDURE — 85027 COMPLETE CBC AUTOMATED: CPT | Performed by: STUDENT IN AN ORGANIZED HEALTH CARE EDUCATION/TRAINING PROGRAM

## 2025-06-20 PROCEDURE — RXMED WILLOW AMBULATORY MEDICATION CHARGE

## 2025-06-20 PROCEDURE — 80069 RENAL FUNCTION PANEL: CPT | Performed by: STUDENT IN AN ORGANIZED HEALTH CARE EDUCATION/TRAINING PROGRAM

## 2025-06-20 PROCEDURE — 36415 COLL VENOUS BLD VENIPUNCTURE: CPT

## 2025-06-20 RX ORDER — CALCIUM CARBONATE 300MG(750)
800 TABLET,CHEWABLE ORAL
Qty: 120 TABLET | Refills: 1 | Status: SHIPPED | OUTPATIENT
Start: 2025-06-20 | End: 2025-08-19

## 2025-06-20 RX ORDER — DARBEPOETIN ALFA 100 UG/.5ML
100 INJECTION, SOLUTION INTRAVENOUS; SUBCUTANEOUS
Qty: 2 ML | Refills: 11 | Status: SHIPPED | OUTPATIENT
Start: 2025-06-20 | End: 2026-06-20

## 2025-06-22 LAB
ALLOSURE SCORE - KIDNEY: 0.17 %
CAREDX_ORDER_ID: NORMAL
CENTER_ORDER_ID: NORMAL
CLIENT SPECIMEN ID - ALLOSURE: NORMAL
DONOR RELATION - ALLOSURE: NORMAL
NOTES - ALLOSURE: NORMAL
RELATIVE CHANGE VALUE - KIDNEY: NORMAL
TEST COMMENTS - ALLOSURE: NORMAL
TIME POST TX - ALLOSURE: NORMAL
TRANSPLANTED ORGAN - ALLOSURE: NORMAL
TX DATE - ALLOSURE/ALLOMAP: NORMAL
WP_ORDER_ID: NORMAL

## 2025-06-23 DIAGNOSIS — Z94.0 KIDNEY REPLACED BY TRANSPLANT (HHS-HCC): ICD-10-CM

## 2025-06-23 PROCEDURE — RXMED WILLOW AMBULATORY MEDICATION CHARGE

## 2025-06-24 ENCOUNTER — APPOINTMENT (OUTPATIENT)
Facility: HOSPITAL | Age: 40
End: 2025-06-24
Payer: MEDICARE

## 2025-06-24 LAB
ALBUMIN SERPL BCP-MCNC: 4.2 G/DL (ref 3.4–5)
ANION GAP SERPL CALC-SCNC: 15 MMOL/L (ref 10–20)
BUN SERPL-MCNC: 33 MG/DL (ref 6–23)
CALCIUM SERPL-MCNC: 9.6 MG/DL (ref 8.6–10.6)
CHLORIDE SERPL-SCNC: 108 MMOL/L (ref 98–107)
CO2 SERPL-SCNC: 19 MMOL/L (ref 21–32)
CREAT SERPL-MCNC: 2.52 MG/DL (ref 0.5–1.3)
EGFRCR SERPLBLD CKD-EPI 2021: 32 ML/MIN/1.73M*2
ERYTHROCYTE [DISTWIDTH] IN BLOOD BY AUTOMATED COUNT: 15.8 % (ref 11.5–14.5)
GLUCOSE SERPL-MCNC: 177 MG/DL (ref 74–99)
HCT VFR BLD AUTO: 31.5 % (ref 41–52)
HGB BLD-MCNC: 8.9 G/DL (ref 13.5–17.5)
MAGNESIUM SERPL-MCNC: 1.79 MG/DL (ref 1.6–2.4)
MCH RBC QN AUTO: 23.2 PG (ref 26–34)
MCHC RBC AUTO-ENTMCNC: 28.3 G/DL (ref 32–36)
MCV RBC AUTO: 82 FL (ref 80–100)
NRBC BLD-RTO: 0 /100 WBCS (ref 0–0)
PHOSPHATE SERPL-MCNC: 3 MG/DL (ref 2.5–4.9)
PLATELET # BLD AUTO: 217 X10*3/UL (ref 150–450)
POTASSIUM SERPL-SCNC: 4.8 MMOL/L (ref 3.5–5.3)
RBC # BLD AUTO: 3.83 X10*6/UL (ref 4.5–5.9)
SODIUM SERPL-SCNC: 137 MMOL/L (ref 136–145)
TACROLIMUS BLD-MCNC: 7.3 NG/ML
WBC # BLD AUTO: 8.2 X10*3/UL (ref 4.4–11.3)

## 2025-06-24 PROCEDURE — 83735 ASSAY OF MAGNESIUM: CPT | Performed by: STUDENT IN AN ORGANIZED HEALTH CARE EDUCATION/TRAINING PROGRAM

## 2025-06-24 PROCEDURE — 80197 ASSAY OF TACROLIMUS: CPT | Performed by: STUDENT IN AN ORGANIZED HEALTH CARE EDUCATION/TRAINING PROGRAM

## 2025-06-24 PROCEDURE — 36415 COLL VENOUS BLD VENIPUNCTURE: CPT

## 2025-06-24 PROCEDURE — 85027 COMPLETE CBC AUTOMATED: CPT | Performed by: STUDENT IN AN ORGANIZED HEALTH CARE EDUCATION/TRAINING PROGRAM

## 2025-06-24 PROCEDURE — 80069 RENAL FUNCTION PANEL: CPT | Performed by: STUDENT IN AN ORGANIZED HEALTH CARE EDUCATION/TRAINING PROGRAM

## 2025-06-24 PROCEDURE — 83036 HEMOGLOBIN GLYCOSYLATED A1C: CPT | Performed by: STUDENT IN AN ORGANIZED HEALTH CARE EDUCATION/TRAINING PROGRAM

## 2025-06-25 ENCOUNTER — PATIENT MESSAGE (OUTPATIENT)
Facility: HOSPITAL | Age: 40
End: 2025-06-25
Payer: COMMERCIAL

## 2025-06-25 DIAGNOSIS — E83.42 HYPOMAGNESEMIA: ICD-10-CM

## 2025-06-25 DIAGNOSIS — Z94.0 KIDNEY REPLACED BY TRANSPLANT (HHS-HCC): ICD-10-CM

## 2025-06-25 DIAGNOSIS — D63.1 ANEMIA DUE TO STAGE 5 CHRONIC KIDNEY DISEASE, NOT ON CHRONIC DIALYSIS: ICD-10-CM

## 2025-06-25 DIAGNOSIS — N18.5 ANEMIA DUE TO STAGE 5 CHRONIC KIDNEY DISEASE, NOT ON CHRONIC DIALYSIS: ICD-10-CM

## 2025-06-25 NOTE — LETTER
July 2, 2025    Tanmay Mcneill  [id number]  [place] [address]      To Whom It May Concern,    Mr Mcneill had a kidney transplant on 4/19/2025, he experienced delayed graft function requiring intermittent dialysis from the date of transplant until 5/25/2025.    If you have any questions or concerns, please don't hesitate to call.    Sincerely,    Geno AGUILAR, RN, [facility]  P. [phone]  F. [phone]

## 2025-06-26 ENCOUNTER — PHARMACY VISIT (OUTPATIENT)
Dept: PHARMACY | Facility: CLINIC | Age: 40
End: 2025-06-26
Payer: COMMERCIAL

## 2025-06-26 ENCOUNTER — OFFICE VISIT (OUTPATIENT)
Facility: HOSPITAL | Age: 40
End: 2025-06-26
Payer: MEDICARE

## 2025-06-26 DIAGNOSIS — Z79.4 TYPE 2 DIABETES MELLITUS WITH HYPERGLYCEMIA, WITH LONG-TERM CURRENT USE OF INSULIN: Primary | ICD-10-CM

## 2025-06-26 DIAGNOSIS — I50.32 CHRONIC DIASTOLIC CONGESTIVE HEART FAILURE: ICD-10-CM

## 2025-06-26 DIAGNOSIS — T38.0X5A STEROID-INDUCED HYPERGLYCEMIA: ICD-10-CM

## 2025-06-26 DIAGNOSIS — E11.65 TYPE 2 DIABETES MELLITUS WITH HYPERGLYCEMIA, WITH LONG-TERM CURRENT USE OF INSULIN: Primary | ICD-10-CM

## 2025-06-26 DIAGNOSIS — E66.01 MORBID OBESITY (MULTI): ICD-10-CM

## 2025-06-26 DIAGNOSIS — R73.9 STEROID-INDUCED HYPERGLYCEMIA: ICD-10-CM

## 2025-06-26 LAB
EST. AVERAGE GLUCOSE BLD GHB EST-MCNC: 160 MG/DL
HBA1C MFR BLD: 7.2 % (ref ?–5.7)

## 2025-06-26 PROCEDURE — 99214 OFFICE O/P EST MOD 30 MIN: CPT | Mod: GC,95 | Performed by: PHYSICIAN ASSISTANT

## 2025-06-26 PROCEDURE — 99214 OFFICE O/P EST MOD 30 MIN: CPT | Performed by: PHYSICIAN ASSISTANT

## 2025-06-26 PROCEDURE — 95251 CONT GLUC MNTR ANALYSIS I&R: CPT | Performed by: PHYSICIAN ASSISTANT

## 2025-06-26 PROCEDURE — RXMED WILLOW AMBULATORY MEDICATION CHARGE

## 2025-06-26 RX ORDER — INSULIN LISPRO 100 [IU]/ML
0-10 INJECTION, SOLUTION INTRAVENOUS; SUBCUTANEOUS
Qty: 30 ML | Refills: 3 | Status: SHIPPED | OUTPATIENT
Start: 2025-06-26 | End: 2026-04-22

## 2025-06-26 RX ORDER — INSULIN GLARGINE-YFGN 100 [IU]/ML
30 INJECTION, SOLUTION SUBCUTANEOUS EVERY MORNING
Qty: 15 ML | Refills: 3 | Status: SHIPPED | OUTPATIENT
Start: 2025-06-26 | End: 2026-01-12

## 2025-06-26 ASSESSMENT — ENCOUNTER SYMPTOMS: FATIGUE: 0

## 2025-06-26 NOTE — PATIENT INSTRUCTIONS
Type 2 diabetes mellitus with hyperglycemia, with long-term current use of insulin  -     insulin glargine-yfgn (Semglee,insulin glarg-yfgn,Pen) 100 unit/mL (3 mL) pen; Inject 30 Units under the skin once daily in the morning. Take as directed per insulin instructions.  -     insulin lispro (HumaLOG) 100 unit/mL pen; Inject 0-10 Units under the skin 4 times a day before meals. Take 15 units with meals 3x per day plus sliding scale, expect up to 60 units total per day  -     Lipid panel; Future  -     Albumin-Creatinine Ratio, Urine Random; Future  -     Hemoglobin A1c; Future  Steroid-induced hyperglycemia  -     insulin glargine-yfgn (Semglee,insulin glarg-yfgn,Pen) 100 unit/mL (3 mL) pen; Inject 30 Units under the skin once daily in the morning. Take as directed per insulin instructions.  -     insulin lispro (HumaLOG) 100 unit/mL pen; Inject 0-10 Units under the skin 4 times a day before meals. Take 15 units with meals 3x per day plus sliding scale, expect up to 60 units total per day  -     Lipid panel; Future  -     Albumin-Creatinine Ratio, Urine Random; Future  -     Hemoglobin A1c; Future  Morbid obesity (Multi)  -     Hemoglobin A1c; Future      Type 2 diabetes mellitus, is not at goal.  A1c due for update, ordered today    RX changes: see updated insulin plan below   Education:  interpretation of lab results, blood sugar goals, complications of diabetes mellitus, and self-monitoring of blood glucose skills  Follow up: I recommend diabetes care be 2 weeks virtually.    INSULIN INSTRUCTIONS    LANTUS/Glargine = 30 units once every morning    HUMALOG        BREAKFAST = 15 units plus sliding scale (take 15 min before meal)       LUNCH = 15 units plus sliding scale (take 15 min before meal)       DINNER = 15 units plus sliding scale (take 15 min before meal)    SLIDING SCALE    = 0u  151-200 = 2u  201-250 = 4u  251-300 = 6u  301-350 = 8u  351-400 = 10u  Over 400 = repeat 10u every 4 hours

## 2025-06-26 NOTE — PROGRESS NOTES
Subjective   Tanmay Mcneill is a 40 y.o. male who presents for follow-up of Type 2 diabetes mellitus. The initial diagnosis of diabetes was made several years ago, first mention in EMR was 2018. The patient does have a known family history of diabetes.    Known complications due to diabetes included chronic kidney disease and obesity    Cardiovascular risk factors include diabetes mellitus, family history of premature cardiovascular disease, male gender, and obesity (BMI >= 30 kg/m2). The patient is not on an ACE inhibitor or angiotensin II receptor blocker.  The patient has not been previously hospitalized due to diabetic ketoacidosis.     Current symptoms/problems include hyperglycemia. His clinical course has worsened as indicated by general glucose elevation on continuous glucose monitor trends.     Current diabetes regimen is as follows: Glargine 8 units every morning, lispro 20 units +2 units per 50 above 150 sliding scale 3 times a day before meals, freestyle leandro 3 CGM   Of note endocrine recommendation upon hospital discharge was 30 units of glargine every morning, lispro 18 units with meals +2 units per 50 above 150 sliding scale 3 times a day before meals, it is unclear when or why patient's glargine was so dramatically reduced but is likely the primary contributor to his persistent hyperglycemia    The patient is currently checking the blood glucose 5+ times per day.    Patient is using: continuous glucose monitor      CGM INTERPRETATION:  Average blood sugar 213 mg/dL, glucose management indicator 8.4%    Glucose less than 70 mg/dL equals 0% of time worn  Glucose ranging between 70 to 180 mg/dL represented by 25% of time worn  Glucose ranging greater than 180 mg/dL represented by 75% of time worn    72 hours of data reviewed in order to inform diabetes treatment plan decision making, patient is not currently at risk for recurrent hypoglycemia safety concerns      Hypoglycemia frequency:  0%  Hypoglycemia awareness: Yes     Exercise: never -limited by recent kidney allograft transplantation, though patient is now well enough to have return to work  Meal panning: He is using avoidance of concentrated sweets.    Review of Systems   Constitutional:  Negative for fatigue.   All other systems reviewed and are negative.      Objective   There were no vitals taken for this visit.  Physical Exam  Constitutional:       Comments: Patient sounds well on audio call, patient unable to connect via camera for full video chat   Pulmonary:      Effort: Pulmonary effort is normal.   Neurological:      General: No focal deficit present.      Mental Status: He is alert and oriented to person, place, and time.   Psychiatric:         Mood and Affect: Mood normal.         Thought Content: Thought content normal.         Lab Review  Glucose (mg/dL)   Date Value   06/24/2025 177 (H)   06/20/2025 175 (H)   06/16/2025 157 (H)     Hemoglobin A1C (%)   Date Value   02/27/2025 7.8 (H)   04/02/2024 7.2 (H)   06/27/2023 7.6 (H)   10/29/2021 6.2 (A)   07/13/2021 6.4 (H)     Bicarbonate (mmol/L)   Date Value   06/24/2025 19 (L)   06/20/2025 20 (L)   06/16/2025 18 (L)     Urea Nitrogen (mg/dL)   Date Value   06/24/2025 33 (H)   06/20/2025 23   06/16/2025 27 (H)     Creatinine (mg/dL)   Date Value   06/24/2025 2.52 (H)   06/20/2025 2.11 (H)   06/16/2025 2.54 (H)       Health Maintenance:   Foot Exam: Due for update at upcoming in person appointment  Eye Exam: Due for update  Lipid Panel: Due for update, ordered for 3 months posttransplant  Urine Albumin: Due for update, ordered for 6 months posttransplant    Assessment/Plan   Diagnoses and all orders for this visit:  Type 2 diabetes mellitus with hyperglycemia, with long-term current use of insulin  -     insulin glargine-yfgn (Semglee,insulin glarg-yfgn,Pen) 100 unit/mL (3 mL) pen; Inject 30 Units under the skin once daily in the morning. Take as directed per insulin instructions.  -      insulin lispro (HumaLOG) 100 unit/mL pen; Inject 0-10 Units under the skin 4 times a day before meals. Take 15 units with meals 3x per day plus sliding scale, expect up to 60 units total per day  -     Lipid panel; Future  -     Albumin-Creatinine Ratio, Urine Random; Future  -     Hemoglobin A1c; Future  Steroid-induced hyperglycemia  -     insulin glargine-yfgn (Semglee,insulin glarg-yfgn,Pen) 100 unit/mL (3 mL) pen; Inject 30 Units under the skin once daily in the morning. Take as directed per insulin instructions.  -     insulin lispro (HumaLOG) 100 unit/mL pen; Inject 0-10 Units under the skin 4 times a day before meals. Take 15 units with meals 3x per day plus sliding scale, expect up to 60 units total per day  -     Lipid panel; Future  -     Albumin-Creatinine Ratio, Urine Random; Future  -     Hemoglobin A1c; Future  Morbid obesity (Multi)  -     Hemoglobin A1c; Future      Type 2 diabetes mellitus, is not at goal.  A1c due for update, ordered today    RX changes: see updated insulin plan below   Education:  interpretation of lab results, blood sugar goals, complications of diabetes mellitus, and self-monitoring of blood glucose skills  Follow up: I recommend diabetes care be 2 weeks virtually.    INSULIN INSTRUCTIONS    LANTUS/Glargine = 30 units once every morning    HUMALOG        BREAKFAST = 15 units plus sliding scale (take 15 min before meal)       LUNCH = 15 units plus sliding scale (take 15 min before meal)       DINNER = 15 units plus sliding scale (take 15 min before meal)    SLIDING SCALE    = 0u  151-200 = 2u  201-250 = 4u  251-300 = 6u  301-350 = 8u  351-400 = 10u  Over 400 = repeat 10u every 4 hours

## 2025-06-27 ENCOUNTER — PHARMACY VISIT (OUTPATIENT)
Dept: PHARMACY | Facility: CLINIC | Age: 40
End: 2025-06-27
Payer: COMMERCIAL

## 2025-06-27 ENCOUNTER — APPOINTMENT (OUTPATIENT)
Facility: HOSPITAL | Age: 40
End: 2025-06-27
Payer: MEDICARE

## 2025-06-27 DIAGNOSIS — Z94.0 KIDNEY REPLACED BY TRANSPLANT (HHS-HCC): ICD-10-CM

## 2025-06-27 LAB
ALBUMIN SERPL BCP-MCNC: 4.2 G/DL (ref 3.4–5)
ANION GAP SERPL CALC-SCNC: 13 MMOL/L (ref 10–20)
BUN SERPL-MCNC: 30 MG/DL (ref 6–23)
CALCIUM SERPL-MCNC: 9.3 MG/DL (ref 8.6–10.6)
CHLORIDE SERPL-SCNC: 108 MMOL/L (ref 98–107)
CO2 SERPL-SCNC: 19 MMOL/L (ref 21–32)
CREAT SERPL-MCNC: 2.33 MG/DL (ref 0.5–1.3)
EGFRCR SERPLBLD CKD-EPI 2021: 35 ML/MIN/1.73M*2
ERYTHROCYTE [DISTWIDTH] IN BLOOD BY AUTOMATED COUNT: 15.3 % (ref 11.5–14.5)
GLUCOSE SERPL-MCNC: 191 MG/DL (ref 74–99)
HCT VFR BLD AUTO: 32.3 % (ref 41–52)
HGB BLD-MCNC: 9 G/DL (ref 13.5–17.5)
MAGNESIUM SERPL-MCNC: 1.81 MG/DL (ref 1.6–2.4)
MCH RBC QN AUTO: 23.1 PG (ref 26–34)
MCHC RBC AUTO-ENTMCNC: 27.9 G/DL (ref 32–36)
MCV RBC AUTO: 83 FL (ref 80–100)
NRBC BLD-RTO: 0 /100 WBCS (ref 0–0)
PHOSPHATE SERPL-MCNC: 3.1 MG/DL (ref 2.5–4.9)
PLATELET # BLD AUTO: 233 X10*3/UL (ref 150–450)
POTASSIUM SERPL-SCNC: 4.7 MMOL/L (ref 3.5–5.3)
RBC # BLD AUTO: 3.9 X10*6/UL (ref 4.5–5.9)
SODIUM SERPL-SCNC: 135 MMOL/L (ref 136–145)
TACROLIMUS BLD-MCNC: 5 NG/ML
WBC # BLD AUTO: 7.5 X10*3/UL (ref 4.4–11.3)

## 2025-06-27 PROCEDURE — 85027 COMPLETE CBC AUTOMATED: CPT | Performed by: STUDENT IN AN ORGANIZED HEALTH CARE EDUCATION/TRAINING PROGRAM

## 2025-06-27 PROCEDURE — 80197 ASSAY OF TACROLIMUS: CPT | Performed by: STUDENT IN AN ORGANIZED HEALTH CARE EDUCATION/TRAINING PROGRAM

## 2025-06-27 PROCEDURE — 36415 COLL VENOUS BLD VENIPUNCTURE: CPT

## 2025-06-27 PROCEDURE — 83735 ASSAY OF MAGNESIUM: CPT | Performed by: STUDENT IN AN ORGANIZED HEALTH CARE EDUCATION/TRAINING PROGRAM

## 2025-06-27 PROCEDURE — 80069 RENAL FUNCTION PANEL: CPT | Performed by: STUDENT IN AN ORGANIZED HEALTH CARE EDUCATION/TRAINING PROGRAM

## 2025-06-30 DIAGNOSIS — Z94.0 KIDNEY REPLACED BY TRANSPLANT (HHS-HCC): ICD-10-CM

## 2025-07-01 ENCOUNTER — NURSE ONLY (OUTPATIENT)
Facility: HOSPITAL | Age: 40
End: 2025-07-01
Payer: MEDICARE

## 2025-07-01 DIAGNOSIS — R73.9 STEROID-INDUCED HYPERGLYCEMIA: ICD-10-CM

## 2025-07-01 DIAGNOSIS — E11.65 TYPE 2 DIABETES MELLITUS WITH HYPERGLYCEMIA, WITH LONG-TERM CURRENT USE OF INSULIN: ICD-10-CM

## 2025-07-01 DIAGNOSIS — T38.0X5A STEROID-INDUCED HYPERGLYCEMIA: ICD-10-CM

## 2025-07-01 DIAGNOSIS — Z79.4 TYPE 2 DIABETES MELLITUS WITH HYPERGLYCEMIA, WITH LONG-TERM CURRENT USE OF INSULIN: ICD-10-CM

## 2025-07-01 DIAGNOSIS — Z94.0 KIDNEY REPLACED BY TRANSPLANT (HHS-HCC): ICD-10-CM

## 2025-07-01 LAB
ALBUMIN SERPL BCP-MCNC: 4.2 G/DL (ref 3.4–5)
ANION GAP SERPL CALC-SCNC: 14 MMOL/L (ref 10–20)
BUN SERPL-MCNC: 28 MG/DL (ref 6–23)
CALCIUM SERPL-MCNC: 9.5 MG/DL (ref 8.6–10.6)
CHLORIDE SERPL-SCNC: 107 MMOL/L (ref 98–107)
CHOLEST SERPL-MCNC: 125 MG/DL (ref 0–199)
CHOLESTEROL/HDL RATIO: 3.7
CO2 SERPL-SCNC: 20 MMOL/L (ref 21–32)
CREAT SERPL-MCNC: 2.28 MG/DL (ref 0.5–1.3)
EGFRCR SERPLBLD CKD-EPI 2021: 36 ML/MIN/1.73M*2
ERYTHROCYTE [DISTWIDTH] IN BLOOD BY AUTOMATED COUNT: 15.1 % (ref 11.5–14.5)
GLUCOSE SERPL-MCNC: 185 MG/DL (ref 74–99)
HCT VFR BLD AUTO: 33.6 % (ref 41–52)
HDLC SERPL-MCNC: 33.9 MG/DL
HGB BLD-MCNC: 9.5 G/DL (ref 13.5–17.5)
LDLC SERPL CALC-MCNC: 69 MG/DL
MAGNESIUM SERPL-MCNC: 1.53 MG/DL (ref 1.6–2.4)
MCH RBC QN AUTO: 23.6 PG (ref 26–34)
MCHC RBC AUTO-ENTMCNC: 28.3 G/DL (ref 32–36)
MCV RBC AUTO: 83 FL (ref 80–100)
NON HDL CHOLESTEROL: 91 MG/DL (ref 0–149)
NRBC BLD-RTO: 0 /100 WBCS (ref 0–0)
PHOSPHATE SERPL-MCNC: 2.3 MG/DL (ref 2.5–4.9)
PLATELET # BLD AUTO: 208 X10*3/UL (ref 150–450)
POTASSIUM SERPL-SCNC: 4.9 MMOL/L (ref 3.5–5.3)
RBC # BLD AUTO: 4.03 X10*6/UL (ref 4.5–5.9)
SODIUM SERPL-SCNC: 136 MMOL/L (ref 136–145)
TACROLIMUS BLD-MCNC: 4.9 NG/ML
TRIGL SERPL-MCNC: 109 MG/DL (ref 0–149)
VLDL: 22 MG/DL (ref 0–40)
WBC # BLD AUTO: 7.2 X10*3/UL (ref 4.4–11.3)

## 2025-07-01 PROCEDURE — 36415 COLL VENOUS BLD VENIPUNCTURE: CPT

## 2025-07-01 PROCEDURE — 85027 COMPLETE CBC AUTOMATED: CPT | Performed by: STUDENT IN AN ORGANIZED HEALTH CARE EDUCATION/TRAINING PROGRAM

## 2025-07-01 PROCEDURE — 82248 BILIRUBIN DIRECT: CPT

## 2025-07-01 PROCEDURE — 83735 ASSAY OF MAGNESIUM: CPT | Performed by: STUDENT IN AN ORGANIZED HEALTH CARE EDUCATION/TRAINING PROGRAM

## 2025-07-01 PROCEDURE — 82040 ASSAY OF SERUM ALBUMIN: CPT

## 2025-07-01 PROCEDURE — 80197 ASSAY OF TACROLIMUS: CPT | Performed by: STUDENT IN AN ORGANIZED HEALTH CARE EDUCATION/TRAINING PROGRAM

## 2025-07-01 PROCEDURE — 80061 LIPID PANEL: CPT

## 2025-07-01 PROCEDURE — 84132 ASSAY OF SERUM POTASSIUM: CPT | Performed by: STUDENT IN AN ORGANIZED HEALTH CARE EDUCATION/TRAINING PROGRAM

## 2025-07-02 DIAGNOSIS — Z94.0 KIDNEY REPLACED BY TRANSPLANT (HHS-HCC): ICD-10-CM

## 2025-07-02 LAB
ALBUMIN SERPL BCP-MCNC: 4.2 G/DL (ref 3.4–5)
ALP SERPL-CCNC: 131 U/L (ref 33–120)
ALT SERPL W P-5'-P-CCNC: 9 U/L (ref 10–52)
AST SERPL W P-5'-P-CCNC: 11 U/L (ref 9–39)
BILIRUB DIRECT SERPL-MCNC: 0.1 MG/DL (ref 0–0.3)
BILIRUB SERPL-MCNC: 0.5 MG/DL (ref 0–1.2)
PROT SERPL-MCNC: 6.9 G/DL (ref 6.4–8.2)

## 2025-07-03 ENCOUNTER — PHARMACY VISIT (OUTPATIENT)
Dept: PHARMACY | Facility: CLINIC | Age: 40
End: 2025-07-03
Payer: COMMERCIAL

## 2025-07-03 ENCOUNTER — NURSE ONLY (OUTPATIENT)
Facility: HOSPITAL | Age: 40
End: 2025-07-03
Payer: MEDICARE

## 2025-07-03 LAB
ALBUMIN SERPL BCP-MCNC: 4 G/DL (ref 3.4–5)
ANION GAP SERPL CALC-SCNC: 14 MMOL/L (ref 10–20)
BUN SERPL-MCNC: 28 MG/DL (ref 6–23)
CALCIUM SERPL-MCNC: 9.7 MG/DL (ref 8.6–10.6)
CHLORIDE SERPL-SCNC: 108 MMOL/L (ref 98–107)
CO2 SERPL-SCNC: 20 MMOL/L (ref 21–32)
CREAT SERPL-MCNC: 2.44 MG/DL (ref 0.5–1.3)
EGFRCR SERPLBLD CKD-EPI 2021: 33 ML/MIN/1.73M*2
ERYTHROCYTE [DISTWIDTH] IN BLOOD BY AUTOMATED COUNT: 15.1 % (ref 11.5–14.5)
GLUCOSE SERPL-MCNC: 184 MG/DL (ref 74–99)
HCT VFR BLD AUTO: 32.4 % (ref 41–52)
HGB BLD-MCNC: 9.5 G/DL (ref 13.5–17.5)
MAGNESIUM SERPL-MCNC: 1.63 MG/DL (ref 1.6–2.4)
MCH RBC QN AUTO: 23.8 PG (ref 26–34)
MCHC RBC AUTO-ENTMCNC: 29.3 G/DL (ref 32–36)
MCV RBC AUTO: 81 FL (ref 80–100)
NRBC BLD-RTO: 0 /100 WBCS (ref 0–0)
PHOSPHATE SERPL-MCNC: 2.7 MG/DL (ref 2.5–4.9)
PLATELET # BLD AUTO: 221 X10*3/UL (ref 150–450)
POTASSIUM SERPL-SCNC: 4.8 MMOL/L (ref 3.5–5.3)
RBC # BLD AUTO: 3.99 X10*6/UL (ref 4.5–5.9)
SODIUM SERPL-SCNC: 137 MMOL/L (ref 136–145)
TACROLIMUS BLD-MCNC: 6 NG/ML
WBC # BLD AUTO: 6.7 X10*3/UL (ref 4.4–11.3)

## 2025-07-03 PROCEDURE — 83735 ASSAY OF MAGNESIUM: CPT | Performed by: STUDENT IN AN ORGANIZED HEALTH CARE EDUCATION/TRAINING PROGRAM

## 2025-07-03 PROCEDURE — 36415 COLL VENOUS BLD VENIPUNCTURE: CPT

## 2025-07-03 PROCEDURE — 80197 ASSAY OF TACROLIMUS: CPT | Performed by: STUDENT IN AN ORGANIZED HEALTH CARE EDUCATION/TRAINING PROGRAM

## 2025-07-03 PROCEDURE — RXMED WILLOW AMBULATORY MEDICATION CHARGE

## 2025-07-03 PROCEDURE — 85027 COMPLETE CBC AUTOMATED: CPT | Performed by: STUDENT IN AN ORGANIZED HEALTH CARE EDUCATION/TRAINING PROGRAM

## 2025-07-03 PROCEDURE — 80069 RENAL FUNCTION PANEL: CPT | Performed by: STUDENT IN AN ORGANIZED HEALTH CARE EDUCATION/TRAINING PROGRAM

## 2025-07-03 RX ORDER — DARBEPOETIN ALFA 100 UG/.5ML
100 INJECTION, SOLUTION INTRAVENOUS; SUBCUTANEOUS
Qty: 2 ML | Refills: 11 | OUTPATIENT
Start: 2025-07-03 | End: 2026-07-03

## 2025-07-03 RX ORDER — LANOLIN ALCOHOL/MO/W.PET/CERES
CREAM (GRAM) TOPICAL
Qty: 120 TABLET | Refills: 1 | Status: SHIPPED | OUTPATIENT
Start: 2025-07-03 | End: 2025-09-01

## 2025-07-04 DIAGNOSIS — Z94.0 KIDNEY REPLACED BY TRANSPLANT (HHS-HCC): ICD-10-CM

## 2025-07-07 DIAGNOSIS — Z94.0 KIDNEY REPLACED BY TRANSPLANT (HHS-HCC): ICD-10-CM

## 2025-07-08 ENCOUNTER — NURSE ONLY (OUTPATIENT)
Facility: HOSPITAL | Age: 40
End: 2025-07-08
Payer: MEDICARE

## 2025-07-08 LAB
ALBUMIN SERPL BCP-MCNC: 4 G/DL (ref 3.4–5)
ANION GAP SERPL CALC-SCNC: 15 MMOL/L (ref 10–20)
BUN SERPL-MCNC: 25 MG/DL (ref 6–23)
CALCIUM SERPL-MCNC: 9.2 MG/DL (ref 8.6–10.6)
CHLORIDE SERPL-SCNC: 108 MMOL/L (ref 98–107)
CO2 SERPL-SCNC: 20 MMOL/L (ref 21–32)
CREAT SERPL-MCNC: 2.54 MG/DL (ref 0.5–1.3)
EGFRCR SERPLBLD CKD-EPI 2021: 31 ML/MIN/1.73M*2
ERYTHROCYTE [DISTWIDTH] IN BLOOD BY AUTOMATED COUNT: 15.9 % (ref 11.5–14.5)
GLUCOSE SERPL-MCNC: 179 MG/DL (ref 74–99)
HCT VFR BLD AUTO: 35.2 % (ref 41–52)
HGB BLD-MCNC: 9.9 G/DL (ref 13.5–17.5)
MAGNESIUM SERPL-MCNC: 1.71 MG/DL (ref 1.6–2.4)
MCH RBC QN AUTO: 23.3 PG (ref 26–34)
MCHC RBC AUTO-ENTMCNC: 28.1 G/DL (ref 32–36)
MCV RBC AUTO: 83 FL (ref 80–100)
NRBC BLD-RTO: 0 /100 WBCS (ref 0–0)
PHOSPHATE SERPL-MCNC: 2.9 MG/DL (ref 2.5–4.9)
PLATELET # BLD AUTO: 204 X10*3/UL (ref 150–450)
POTASSIUM SERPL-SCNC: 4.5 MMOL/L (ref 3.5–5.3)
RBC # BLD AUTO: 4.24 X10*6/UL (ref 4.5–5.9)
SODIUM SERPL-SCNC: 138 MMOL/L (ref 136–145)
TACROLIMUS BLD-MCNC: 6.7 NG/ML
WBC # BLD AUTO: 6.1 X10*3/UL (ref 4.4–11.3)

## 2025-07-08 PROCEDURE — 83735 ASSAY OF MAGNESIUM: CPT | Performed by: STUDENT IN AN ORGANIZED HEALTH CARE EDUCATION/TRAINING PROGRAM

## 2025-07-08 PROCEDURE — 85027 COMPLETE CBC AUTOMATED: CPT | Performed by: STUDENT IN AN ORGANIZED HEALTH CARE EDUCATION/TRAINING PROGRAM

## 2025-07-08 PROCEDURE — 36415 COLL VENOUS BLD VENIPUNCTURE: CPT

## 2025-07-08 PROCEDURE — 80197 ASSAY OF TACROLIMUS: CPT | Performed by: STUDENT IN AN ORGANIZED HEALTH CARE EDUCATION/TRAINING PROGRAM

## 2025-07-08 PROCEDURE — 80069 RENAL FUNCTION PANEL: CPT | Performed by: STUDENT IN AN ORGANIZED HEALTH CARE EDUCATION/TRAINING PROGRAM

## 2025-07-09 ENCOUNTER — TELEPHONE (OUTPATIENT)
Facility: HOSPITAL | Age: 40
End: 2025-07-09
Payer: COMMERCIAL

## 2025-07-09 DIAGNOSIS — Z94.0 KIDNEY REPLACED BY TRANSPLANT (HHS-HCC): ICD-10-CM

## 2025-07-10 NOTE — TELEPHONE ENCOUNTER
Pt reached out via [employer] requesting a call from coordinator. Call placed to patient, he reports he has a cold, only mucous and congestion denies fever and inquiring what he can take. Advised him to take plain Mucinex and Tylenol PRN.     Pt also reports he feels like he's been dehydrated, he's been working out recently and still taking lasix 40 mg BID, denies swelling. Pt reports he's drinking all day long but just losing it via pee or sweat.     Patient discussed in 365 yesterday and plan per Dr Ontiveros was to increase Envarsus to 14 mg daily and arrange biopsy. Pt informed of recommendations, he's been taking 10 mg of Envarsus so he'll increase to 12 mg starting this morning. Will re-discuss biopsy plan since patient seems to be dehydrated. Pt agreeable to come to clinic tomorrow @ 8:30 am. Task sent to sec to schedule.

## 2025-07-10 NOTE — TELEPHONE ENCOUNTER
Re-discussed plan with Dr Weston snyder to hold off on biopsy at this time. Plan to stop lasix, increase hydration and repeat labs. If no improvement, will plan to biopsy. Pt updated and verbalized understanding.

## 2025-07-11 ENCOUNTER — OFFICE VISIT (OUTPATIENT)
Facility: HOSPITAL | Age: 40
End: 2025-07-11
Payer: MEDICARE

## 2025-07-11 VITALS
BODY MASS INDEX: 45.23 KG/M2 | TEMPERATURE: 96.9 F | WEIGHT: 315 LBS | OXYGEN SATURATION: 98 % | SYSTOLIC BLOOD PRESSURE: 175 MMHG | HEART RATE: 62 BPM | DIASTOLIC BLOOD PRESSURE: 84 MMHG

## 2025-07-11 DIAGNOSIS — Z94.0 KIDNEY REPLACED BY TRANSPLANT (HHS-HCC): ICD-10-CM

## 2025-07-11 DIAGNOSIS — Z79.899 IMMUNOSUPPRESSIVE MANAGEMENT ENCOUNTER FOLLOWING KIDNEY TRANSPLANT: Primary | ICD-10-CM

## 2025-07-11 DIAGNOSIS — T38.0X5A STEROID-INDUCED HYPERGLYCEMIA: ICD-10-CM

## 2025-07-11 DIAGNOSIS — R73.9 STEROID-INDUCED HYPERGLYCEMIA: ICD-10-CM

## 2025-07-11 DIAGNOSIS — Z79.4 TYPE 2 DIABETES MELLITUS WITH HYPERGLYCEMIA, WITH LONG-TERM CURRENT USE OF INSULIN: Chronic | ICD-10-CM

## 2025-07-11 DIAGNOSIS — E11.65 TYPE 2 DIABETES MELLITUS WITH HYPERGLYCEMIA, WITH LONG-TERM CURRENT USE OF INSULIN: Chronic | ICD-10-CM

## 2025-07-11 DIAGNOSIS — Z94.0 IMMUNOSUPPRESSIVE MANAGEMENT ENCOUNTER FOLLOWING KIDNEY TRANSPLANT: Primary | ICD-10-CM

## 2025-07-11 PROCEDURE — 3048F LDL-C <100 MG/DL: CPT | Performed by: TRANSPLANT SURGERY

## 2025-07-11 PROCEDURE — 3077F SYST BP >= 140 MM HG: CPT | Performed by: TRANSPLANT SURGERY

## 2025-07-11 PROCEDURE — 99214 OFFICE O/P EST MOD 30 MIN: CPT | Mod: 24

## 2025-07-11 PROCEDURE — 99214 OFFICE O/P EST MOD 30 MIN: CPT | Performed by: TRANSPLANT SURGERY

## 2025-07-11 PROCEDURE — 3051F HG A1C>EQUAL 7.0%<8.0%: CPT | Performed by: TRANSPLANT SURGERY

## 2025-07-11 PROCEDURE — 3079F DIAST BP 80-89 MM HG: CPT | Performed by: TRANSPLANT SURGERY

## 2025-07-11 RX ORDER — CARVEDILOL 12.5 MG/1
25 TABLET ORAL 2 TIMES DAILY
Qty: 120 TABLET | Refills: 11 | Status: SHIPPED | OUTPATIENT
Start: 2025-07-11 | End: 2026-07-11

## 2025-07-11 ASSESSMENT — PAIN SCALES - GENERAL: PAINLEVEL_OUTOF10: 0-NO PAIN

## 2025-07-11 NOTE — PATIENT INSTRUCTIONS
Stop clotrimazole villa  Stop Acyclovir  Stop Letermovir  Increase carvedilol 25 mg every 12 hours  Increase Envarsus 14 mg daily  Labs twice weekly  RTC 1 month with Nephrology

## 2025-07-11 NOTE — PROGRESS NOTES
Tanmay Mcneill is a 40 y.o. male POD#5283 from DDKT from a DCD donor.    - Increase hydration  - Was using the sauna- advised to stop  - Lasix held      Objective   /84   Pulse 62   Temp 36.1 °C (96.9 °F) (Temporal)   Wt (!) 155 kg (342 lb 12.8 oz)   SpO2 98%   BMI 45.23 kg/m²     Gen: A+OX3; NAD  HEENT: PERRL, sclera anicteric, MMM  Cardiac: RRR  Chest: Normal inspiratory effort  Abdomen: S/NT/+seroma. Incision C/D/I.  Ext: No LE edema    Assessment/Plan   ESRD 2/2 DM and HTN     S/p DDKT 4/19/25   DCD donor, KDPI 48%/PRA 19  Full cava used as venous extension 2/2 depth  DGF, slowly improving  Has not gotten bx yet - deep kidney  Allosure repeat today (negative 5/12)  DSA negative 5/27  Stop Lasix  All drains out  Considering biopsy if Cr stalls.   Was not hydrating well and using sauna.      Immunosuppression reviewed and adjusted to minimize toxicity  PRA 19%  Induction: Thymo 4.5mg/kg  Envarsus 12 mg. Increase to 14 mg daily as we are stopping letermovir/acylovir  Convert to Myfortic 720 mg BID  Prednisone 5 mg     3. Diabetes  Appreciate endocrine  Sugars well controlled at home     4. Ppx  CMV: D-/R+: stopped prophylaxis  EBV: D+/R+  Continue atovaquone  Protonix increase to BID, continue for reflux     5. HTN  Amlodipine 10 mg  Coreg  12.5 mg increase to 25 mg po BID for hypertension.     Follow up 4 weeks with nephrology  Labs twice weekly    I spent 35 minutes in the professional and overall care of this patient, including immunosuppression management.  Marcin Rodriguez MD

## 2025-07-14 ENCOUNTER — NURSE ONLY (OUTPATIENT)
Facility: HOSPITAL | Age: 40
End: 2025-07-14
Payer: MEDICARE

## 2025-07-14 DIAGNOSIS — Z94.0 KIDNEY REPLACED BY TRANSPLANT (HHS-HCC): ICD-10-CM

## 2025-07-14 LAB
ALBUMIN SERPL BCP-MCNC: 4.1 G/DL (ref 3.4–5)
ANION GAP SERPL CALC-SCNC: 12 MMOL/L (ref 10–20)
BUN SERPL-MCNC: 30 MG/DL (ref 6–23)
CALCIUM SERPL-MCNC: 9.4 MG/DL (ref 8.6–10.6)
CHLORIDE SERPL-SCNC: 108 MMOL/L (ref 98–107)
CO2 SERPL-SCNC: 21 MMOL/L (ref 21–32)
CREAT SERPL-MCNC: 2.16 MG/DL (ref 0.5–1.3)
EGFRCR SERPLBLD CKD-EPI 2021: 38 ML/MIN/1.73M*2
ERYTHROCYTE [DISTWIDTH] IN BLOOD BY AUTOMATED COUNT: 15.1 % (ref 11.5–14.5)
GLUCOSE SERPL-MCNC: 194 MG/DL (ref 74–99)
HCT VFR BLD AUTO: 35.2 % (ref 41–52)
HGB BLD-MCNC: 10.1 G/DL (ref 13.5–17.5)
MAGNESIUM SERPL-MCNC: 1.6 MG/DL (ref 1.6–2.4)
MCH RBC QN AUTO: 23.4 PG (ref 26–34)
MCHC RBC AUTO-ENTMCNC: 28.7 G/DL (ref 32–36)
MCV RBC AUTO: 82 FL (ref 80–100)
NRBC BLD-RTO: 0 /100 WBCS (ref 0–0)
PHOSPHATE SERPL-MCNC: 2.7 MG/DL (ref 2.5–4.9)
PLATELET # BLD AUTO: 227 X10*3/UL (ref 150–450)
POTASSIUM SERPL-SCNC: 4.7 MMOL/L (ref 3.5–5.3)
RBC # BLD AUTO: 4.31 X10*6/UL (ref 4.5–5.9)
SODIUM SERPL-SCNC: 136 MMOL/L (ref 136–145)
TACROLIMUS BLD-MCNC: 9.4 NG/ML
WBC # BLD AUTO: 5.5 X10*3/UL (ref 4.4–11.3)

## 2025-07-14 PROCEDURE — 83735 ASSAY OF MAGNESIUM: CPT | Performed by: STUDENT IN AN ORGANIZED HEALTH CARE EDUCATION/TRAINING PROGRAM

## 2025-07-14 PROCEDURE — 80197 ASSAY OF TACROLIMUS: CPT | Performed by: STUDENT IN AN ORGANIZED HEALTH CARE EDUCATION/TRAINING PROGRAM

## 2025-07-14 PROCEDURE — 87799 DETECT AGENT NOS DNA QUANT: CPT | Performed by: TRANSPLANT SURGERY

## 2025-07-14 PROCEDURE — 80069 RENAL FUNCTION PANEL: CPT | Performed by: STUDENT IN AN ORGANIZED HEALTH CARE EDUCATION/TRAINING PROGRAM

## 2025-07-14 PROCEDURE — 85027 COMPLETE CBC AUTOMATED: CPT | Performed by: STUDENT IN AN ORGANIZED HEALTH CARE EDUCATION/TRAINING PROGRAM

## 2025-07-14 PROCEDURE — 36415 COLL VENOUS BLD VENIPUNCTURE: CPT

## 2025-07-15 ENCOUNTER — PATIENT MESSAGE (OUTPATIENT)
Facility: HOSPITAL | Age: 40
End: 2025-07-15

## 2025-07-15 ENCOUNTER — TELEMEDICINE (OUTPATIENT)
Dept: ENDOCRINOLOGY | Facility: CLINIC | Age: 40
End: 2025-07-15
Payer: MEDICARE

## 2025-07-15 ENCOUNTER — APPOINTMENT (OUTPATIENT)
Dept: ENDOCRINOLOGY | Facility: CLINIC | Age: 40
End: 2025-07-15
Payer: MEDICARE

## 2025-07-15 DIAGNOSIS — Z79.4 TYPE 2 DIABETES MELLITUS WITH HYPERGLYCEMIA, WITH LONG-TERM CURRENT USE OF INSULIN: Primary | Chronic | ICD-10-CM

## 2025-07-15 DIAGNOSIS — Z79.899 IMMUNOSUPPRESSIVE MANAGEMENT ENCOUNTER FOLLOWING KIDNEY TRANSPLANT: ICD-10-CM

## 2025-07-15 DIAGNOSIS — E11.65 TYPE 2 DIABETES MELLITUS WITH HYPERGLYCEMIA, WITH LONG-TERM CURRENT USE OF INSULIN: Primary | Chronic | ICD-10-CM

## 2025-07-15 DIAGNOSIS — R35.0 URINARY FREQUENCY: ICD-10-CM

## 2025-07-15 DIAGNOSIS — R73.9 STEROID-INDUCED HYPERGLYCEMIA: ICD-10-CM

## 2025-07-15 DIAGNOSIS — Z94.0 IMMUNOSUPPRESSIVE MANAGEMENT ENCOUNTER FOLLOWING KIDNEY TRANSPLANT: ICD-10-CM

## 2025-07-15 DIAGNOSIS — E66.01 MORBID OBESITY (MULTI): ICD-10-CM

## 2025-07-15 DIAGNOSIS — Z94.0 KIDNEY REPLACED BY TRANSPLANT (HHS-HCC): ICD-10-CM

## 2025-07-15 DIAGNOSIS — T38.0X5A STEROID-INDUCED HYPERGLYCEMIA: ICD-10-CM

## 2025-07-15 LAB
BKV DNA SERPL NAA+PROBE-ACNC: ABNORMAL IU/ML (ref ?–22)
BKV DNA SERPL NAA+PROBE-LOG#: 4.53 LOG IU/ML
CMV DNA SERPL NAA+PROBE-LOG IU: NORMAL {LOG_IU}/ML
EBV DNA SPEC NAA+PROBE-LOG#: NORMAL {LOG_COPIES}/ML
LABORATORY COMMENT REPORT: DETECTED
LABORATORY COMMENT REPORT: NOT DETECTED
LABORATORY COMMENT REPORT: NOT DETECTED

## 2025-07-15 PROCEDURE — 3048F LDL-C <100 MG/DL: CPT | Performed by: PHYSICIAN ASSISTANT

## 2025-07-15 PROCEDURE — 95251 CONT GLUC MNTR ANALYSIS I&R: CPT | Performed by: PHYSICIAN ASSISTANT

## 2025-07-15 PROCEDURE — 3051F HG A1C>EQUAL 7.0%<8.0%: CPT | Performed by: PHYSICIAN ASSISTANT

## 2025-07-15 PROCEDURE — 99214 OFFICE O/P EST MOD 30 MIN: CPT | Performed by: PHYSICIAN ASSISTANT

## 2025-07-15 RX ORDER — MYCOPHENOLIC ACID 180 MG/1
360 TABLET, DELAYED RELEASE ORAL 2 TIMES DAILY
Qty: 120 TABLET | Refills: 11 | Status: SHIPPED | OUTPATIENT
Start: 2025-07-15 | End: 2026-07-15

## 2025-07-15 ASSESSMENT — ENCOUNTER SYMPTOMS: FATIGUE: 0

## 2025-07-15 NOTE — PROGRESS NOTES
Subjective   Tanmay Mcneill is a 40 y.o. male who presents for follow-up of Type 2 diabetes mellitus. The initial diagnosis of diabetes was made several years ago, first mention in EMR was 2018. The patient does have a known family history of diabetes.    Known complications due to diabetes included chronic kidney disease and obesity    Cardiovascular risk factors include diabetes mellitus, family history of premature cardiovascular disease, male gender, and obesity (BMI >= 30 kg/m2). The patient is not on an ACE inhibitor or angiotensin II receptor blocker.  The patient has not been previously hospitalized due to diabetic ketoacidosis.     Current symptoms/problems include hyperglycemia. His clinical course has worsened as indicated by general glucose elevation on continuous glucose monitor trends.     Current diabetes regimen is as follows: Glargine 8 units every morning, lispro 20 units +2 units per 50 above 150 sliding scale 3 times a day before meals, freestyle leandro 3 CGM   Of note endocrine recommendation upon hospital discharge was 30 units of glargine every morning, lispro 18 units with meals +2 units per 50 above 150 sliding scale 3 times a day before meals, it is unclear when or why patient's glargine was so dramatically reduced but is likely the primary contributor to his persistent hyperglycemia    The patient is currently checking the blood glucose 5+ times per day.    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    Review of Systems   Constitutional:  Negative for fatigue.   All other systems reviewed and are negative.      Objective   There were no vitals taken for this visit.  Physical Exam  Constitutional:       Comments: Patient sounds well on audio call, patient unable to connect via camera for full video chat   Pulmonary:      Effort: Pulmonary effort is normal.   Neurological:      General: No focal deficit present.      Mental Status: He is alert and oriented to person, place, and time.   Psychiatric:         Mood and Affect: Mood normal.         Thought Content: Thought content normal.         Lab Review  Glucose (mg/dL)   Date Value   07/14/2025 194 (H)   07/08/2025 179 (H)   07/03/2025 184 (H)     Hemoglobin A1C (%)   Date Value   06/24/2025 7.2 (H)   02/27/2025 7.8 (H)   04/02/2024 7.2 (H)   06/27/2023 7.6 (H)   10/29/2021 6.2 (A)   07/13/2021 6.4 (H)     Bicarbonate (mmol/L)   Date Value   07/14/2025 21   07/08/2025 20 (L)   07/03/2025 20 (L)     Urea Nitrogen (mg/dL)   Date Value   07/14/2025 30 (H)   07/08/2025 25 (H)   07/03/2025 28 (H)     Creatinine (mg/dL)   Date Value   07/14/2025 2.16 (H)   07/08/2025 2.54 (H)   07/03/2025 2.44 (H)       Health Maintenance:   Foot Exam: Due for update at upcoming in person appointment  Eye Exam: Due for update  Lipid Panel: Due for update, ordered for 3 months posttransplant  Urine Albumin: Due for update, ordered for 6 months posttransplant    Assessment/Plan   Diagnoses and all orders for this visit:  Type 2 diabetes mellitus with hyperglycemia, with long-term current use of insulin  Steroid-induced hyperglycemia  Morbid obesity (Multi)    Type 2 diabetes mellitus, is not at goal.  A1c due for update, ordered today    RX changes: see updated insulin plan below    Education:  interpretation of lab results, blood sugar goals, complications of diabetes mellitus, and self-monitoring of blood glucose skills  Follow up: I recommend diabetes care be in 4 weeks.    INSULIN INSTRUCTIONS    LANTUS/Glargine = 30 units once every morning    HUMALOG        BREAKFAST = 20 units plus sliding scale (take 15 min before meal)       LUNCH = 20 units plus sliding scale (take 15 min before meal)       DINNER = 20 units plus sliding scale (take 15 min before meal)    SLIDING SCALE    = 0u  151-200 = 2u  201-250 = 4u  251-300 = 6u  301-350 = 8u  351-400 = 10u  Over 400 = repeat 10u every 4 hours

## 2025-07-15 NOTE — PATIENT INSTRUCTIONS
Type 2 diabetes mellitus with hyperglycemia, with long-term current use of insulin  Steroid-induced hyperglycemia  Morbid obesity (Multi)    Type 2 diabetes mellitus, is not at goal.  A1c due for update, ordered today    RX changes: see updated insulin plan below   Education:  interpretation of lab results, blood sugar goals, complications of diabetes mellitus, and self-monitoring of blood glucose skills  Follow up: I recommend diabetes care be in 4 weeks.    INSULIN INSTRUCTIONS    LANTUS/Glargine = 30 units once every morning    HUMALOG        BREAKFAST = 20 units plus sliding scale (take 15 min before meal)       LUNCH = 20 units plus sliding scale (take 15 min before meal)       DINNER = 20 units plus sliding scale (take 15 min before meal)    SLIDING SCALE    = 0u  151-200 = 2u  201-250 = 4u  251-300 = 6u  301-350 = 8u  351-400 = 10u  Over 400 = repeat 10u every 4 hours

## 2025-07-16 DIAGNOSIS — Z94.0 KIDNEY REPLACED BY TRANSPLANT (HHS-HCC): ICD-10-CM

## 2025-07-18 ENCOUNTER — NURSE ONLY (OUTPATIENT)
Facility: HOSPITAL | Age: 40
End: 2025-07-18
Payer: MEDICARE

## 2025-07-18 DIAGNOSIS — Z94.0 KIDNEY REPLACED BY TRANSPLANT (HHS-HCC): ICD-10-CM

## 2025-07-18 LAB
APPEARANCE UR: CLEAR
BILIRUB UR STRIP.AUTO-MCNC: NEGATIVE MG/DL
COLOR UR: ABNORMAL
GLUCOSE UR STRIP.AUTO-MCNC: NORMAL MG/DL
KETONES UR STRIP.AUTO-MCNC: NEGATIVE MG/DL
LEUKOCYTE ESTERASE UR QL STRIP.AUTO: NEGATIVE
NITRITE UR QL STRIP.AUTO: NEGATIVE
PH UR STRIP.AUTO: 5.5 [PH]
PROT UR STRIP.AUTO-MCNC: ABNORMAL MG/DL
RBC # UR STRIP.AUTO: ABNORMAL MG/DL
RBC #/AREA URNS AUTO: NORMAL /HPF
SP GR UR STRIP.AUTO: 1.02
SQUAMOUS #/AREA URNS AUTO: NORMAL /HPF
UROBILINOGEN UR STRIP.AUTO-MCNC: NORMAL MG/DL
WBC #/AREA URNS AUTO: NORMAL /HPF

## 2025-07-18 PROCEDURE — 81001 URINALYSIS AUTO W/SCOPE: CPT | Performed by: TRANSPLANT SURGERY

## 2025-07-19 ENCOUNTER — DOCUMENTATION (OUTPATIENT)
Facility: HOSPITAL | Age: 40
End: 2025-07-19
Payer: COMMERCIAL

## 2025-07-20 LAB — HOLD SPECIMEN: NORMAL

## 2025-07-20 PROCEDURE — RXMED WILLOW AMBULATORY MEDICATION CHARGE

## 2025-07-21 ENCOUNTER — HOSPITAL ENCOUNTER (OUTPATIENT)
Dept: RADIOLOGY | Facility: CLINIC | Age: 40
Discharge: HOME | End: 2025-07-21
Payer: COMMERCIAL

## 2025-07-21 DIAGNOSIS — Z94.0 KIDNEY REPLACED BY TRANSPLANT (HHS-HCC): ICD-10-CM

## 2025-07-21 DIAGNOSIS — R06.02 SOB (SHORTNESS OF BREATH): ICD-10-CM

## 2025-07-21 PROCEDURE — 71046 X-RAY EXAM CHEST 2 VIEWS: CPT

## 2025-07-21 PROCEDURE — 71046 X-RAY EXAM CHEST 2 VIEWS: CPT | Performed by: RADIOLOGY

## 2025-07-23 ENCOUNTER — NURSE ONLY (OUTPATIENT)
Facility: HOSPITAL | Age: 40
End: 2025-07-23
Payer: COMMERCIAL

## 2025-07-23 DIAGNOSIS — Z94.0 KIDNEY REPLACED BY TRANSPLANT (HHS-HCC): ICD-10-CM

## 2025-07-23 LAB
ALBUMIN SERPL BCP-MCNC: 4.3 G/DL (ref 3.4–5)
ANION GAP SERPL CALC-SCNC: 14 MMOL/L (ref 10–20)
BUN SERPL-MCNC: 29 MG/DL (ref 6–23)
CALCIUM SERPL-MCNC: 9.6 MG/DL (ref 8.6–10.6)
CHLORIDE SERPL-SCNC: 109 MMOL/L (ref 98–107)
CO2 SERPL-SCNC: 20 MMOL/L (ref 21–32)
CREAT SERPL-MCNC: 2.21 MG/DL (ref 0.5–1.3)
EGFRCR SERPLBLD CKD-EPI 2021: 37 ML/MIN/1.73M*2
ERYTHROCYTE [DISTWIDTH] IN BLOOD BY AUTOMATED COUNT: 14.2 % (ref 11.5–14.5)
GLUCOSE SERPL-MCNC: 163 MG/DL (ref 74–99)
HCT VFR BLD AUTO: 36.4 % (ref 41–52)
HGB BLD-MCNC: 10.5 G/DL (ref 13.5–17.5)
MAGNESIUM SERPL-MCNC: 1.55 MG/DL (ref 1.6–2.4)
MCH RBC QN AUTO: 23.1 PG (ref 26–34)
MCHC RBC AUTO-ENTMCNC: 28.8 G/DL (ref 32–36)
MCV RBC AUTO: 80 FL (ref 80–100)
NRBC BLD-RTO: 0 /100 WBCS (ref 0–0)
PHOSPHATE SERPL-MCNC: 3 MG/DL (ref 2.5–4.9)
PLATELET # BLD AUTO: 229 X10*3/UL (ref 150–450)
POTASSIUM SERPL-SCNC: 4.5 MMOL/L (ref 3.5–5.3)
RBC # BLD AUTO: 4.55 X10*6/UL (ref 4.5–5.9)
SODIUM SERPL-SCNC: 138 MMOL/L (ref 136–145)
TACROLIMUS BLD-MCNC: 5.7 NG/ML
WBC # BLD AUTO: 4.4 X10*3/UL (ref 4.4–11.3)

## 2025-07-23 PROCEDURE — 87799 DETECT AGENT NOS DNA QUANT: CPT | Performed by: TRANSPLANT SURGERY

## 2025-07-23 PROCEDURE — 83735 ASSAY OF MAGNESIUM: CPT

## 2025-07-23 PROCEDURE — 36415 COLL VENOUS BLD VENIPUNCTURE: CPT

## 2025-07-23 PROCEDURE — 87799 DETECT AGENT NOS DNA QUANT: CPT

## 2025-07-23 PROCEDURE — 80197 ASSAY OF TACROLIMUS: CPT

## 2025-07-23 PROCEDURE — 80069 RENAL FUNCTION PANEL: CPT

## 2025-07-23 PROCEDURE — 85027 COMPLETE CBC AUTOMATED: CPT

## 2025-07-24 LAB
BKV DNA SERPL NAA+PROBE-ACNC: ABNORMAL IU/ML (ref ?–22)
BKV DNA SERPL NAA+PROBE-LOG#: 4.64 LOG IU/ML
CMV DNA SERPL NAA+PROBE-LOG IU: NORMAL {LOG_IU}/ML
EBV DNA SPEC NAA+PROBE-LOG#: NORMAL {LOG_COPIES}/ML
LABORATORY COMMENT REPORT: DETECTED
LABORATORY COMMENT REPORT: NOT DETECTED
LABORATORY COMMENT REPORT: NOT DETECTED

## 2025-07-25 ENCOUNTER — TELEPHONE (OUTPATIENT)
Facility: HOSPITAL | Age: 40
End: 2025-07-25

## 2025-07-25 DIAGNOSIS — Z13.89 SCREENING FOR BLOOD OR PROTEIN IN URINE: ICD-10-CM

## 2025-07-25 DIAGNOSIS — Z94.0 KIDNEY REPLACED BY TRANSPLANT (HHS-HCC): ICD-10-CM

## 2025-07-25 LAB
ALLOSURE SCORE - KIDNEY: < 0.04 %
CAREDX_ORDER_ID: NORMAL
CENTER_ORDER_ID: NORMAL
CLIENT SPECIMEN ID - ALLOSURE: NORMAL
DONOR RELATION - ALLOSURE: NORMAL
NOTES - ALLOSURE: NORMAL
RELATIVE CHANGE VALUE - KIDNEY: NORMAL
TEST COMMENTS - ALLOSURE: NORMAL
TIME POST TX - ALLOSURE: NORMAL
TRANSPLANTED ORGAN - ALLOSURE: NORMAL
TX DATE - ALLOSURE/ALLOMAP: NORMAL
WP_ORDER_ID: NORMAL

## 2025-07-28 DIAGNOSIS — Z94.0 KIDNEY REPLACED BY TRANSPLANT (HHS-HCC): ICD-10-CM

## 2025-07-28 PROCEDURE — RXMED WILLOW AMBULATORY MEDICATION CHARGE

## 2025-07-29 ENCOUNTER — NURSE ONLY (OUTPATIENT)
Facility: HOSPITAL | Age: 40
End: 2025-07-29
Payer: COMMERCIAL

## 2025-07-29 ENCOUNTER — PHARMACY VISIT (OUTPATIENT)
Dept: PHARMACY | Facility: CLINIC | Age: 40
End: 2025-07-29
Payer: COMMERCIAL

## 2025-07-29 DIAGNOSIS — Z94.0 KIDNEY REPLACED BY TRANSPLANT (HHS-HCC): ICD-10-CM

## 2025-07-29 LAB
ALBUMIN SERPL BCP-MCNC: 4.2 G/DL (ref 3.4–5)
ANION GAP SERPL CALC-SCNC: 14 MMOL/L (ref 10–20)
BUN SERPL-MCNC: 23 MG/DL (ref 6–23)
CALCIUM SERPL-MCNC: 9.7 MG/DL (ref 8.6–10.6)
CHLORIDE SERPL-SCNC: 107 MMOL/L (ref 98–107)
CO2 SERPL-SCNC: 20 MMOL/L (ref 21–32)
CREAT SERPL-MCNC: 2.39 MG/DL (ref 0.5–1.3)
CREAT UR-MCNC: 96.8 MG/DL (ref 20–370)
EGFRCR SERPLBLD CKD-EPI 2021: 34 ML/MIN/1.73M*2
ERYTHROCYTE [DISTWIDTH] IN BLOOD BY AUTOMATED COUNT: 14.4 % (ref 11.5–14.5)
GLUCOSE SERPL-MCNC: 168 MG/DL (ref 74–99)
HCT VFR BLD AUTO: 39.3 % (ref 41–52)
HGB BLD-MCNC: 11.1 G/DL (ref 13.5–17.5)
MAGNESIUM SERPL-MCNC: 1.77 MG/DL (ref 1.6–2.4)
MCH RBC QN AUTO: 22.9 PG (ref 26–34)
MCHC RBC AUTO-ENTMCNC: 28.2 G/DL (ref 32–36)
MCV RBC AUTO: 81 FL (ref 80–100)
NRBC BLD-RTO: 0 /100 WBCS (ref 0–0)
PHOSPHATE SERPL-MCNC: 2.9 MG/DL (ref 2.5–4.9)
PLATELET # BLD AUTO: 219 X10*3/UL (ref 150–450)
POTASSIUM SERPL-SCNC: 4.4 MMOL/L (ref 3.5–5.3)
PROT UR-ACNC: 50 MG/DL (ref 5–25)
PROT/CREAT UR: 0.52 MG/MG CREAT (ref 0–0.17)
RBC # BLD AUTO: 4.85 X10*6/UL (ref 4.5–5.9)
SODIUM SERPL-SCNC: 137 MMOL/L (ref 136–145)
TACROLIMUS BLD-MCNC: 5.2 NG/ML
WBC # BLD AUTO: 4.1 X10*3/UL (ref 4.4–11.3)

## 2025-07-29 PROCEDURE — 85027 COMPLETE CBC AUTOMATED: CPT | Performed by: STUDENT IN AN ORGANIZED HEALTH CARE EDUCATION/TRAINING PROGRAM

## 2025-07-29 PROCEDURE — 84156 ASSAY OF PROTEIN URINE: CPT | Performed by: INTERNAL MEDICINE

## 2025-07-29 PROCEDURE — 83735 ASSAY OF MAGNESIUM: CPT | Performed by: STUDENT IN AN ORGANIZED HEALTH CARE EDUCATION/TRAINING PROGRAM

## 2025-07-29 PROCEDURE — 36415 COLL VENOUS BLD VENIPUNCTURE: CPT

## 2025-07-29 PROCEDURE — 80069 RENAL FUNCTION PANEL: CPT | Performed by: STUDENT IN AN ORGANIZED HEALTH CARE EDUCATION/TRAINING PROGRAM

## 2025-07-29 PROCEDURE — 80197 ASSAY OF TACROLIMUS: CPT | Performed by: STUDENT IN AN ORGANIZED HEALTH CARE EDUCATION/TRAINING PROGRAM

## 2025-07-29 PROCEDURE — 87799 DETECT AGENT NOS DNA QUANT: CPT

## 2025-07-29 PROCEDURE — 87385 HISTOPLASMA CAPSUL AG IA: CPT | Performed by: INTERNAL MEDICINE

## 2025-07-30 ENCOUNTER — PATIENT MESSAGE (OUTPATIENT)
Facility: HOSPITAL | Age: 40
End: 2025-07-30
Payer: COMMERCIAL

## 2025-07-30 DIAGNOSIS — Z94.0 KIDNEY REPLACED BY TRANSPLANT (HHS-HCC): ICD-10-CM

## 2025-07-30 DIAGNOSIS — E11.65 TYPE 2 DIABETES MELLITUS WITH HYPERGLYCEMIA, WITH LONG-TERM CURRENT USE OF INSULIN: Chronic | ICD-10-CM

## 2025-07-30 DIAGNOSIS — Z79.4 TYPE 2 DIABETES MELLITUS WITH HYPERGLYCEMIA, WITH LONG-TERM CURRENT USE OF INSULIN: Chronic | ICD-10-CM

## 2025-07-30 LAB
BKV DNA SERPL NAA+PROBE-ACNC: ABNORMAL IU/ML (ref ?–22)
BKV DNA SERPL NAA+PROBE-LOG#: 4.16 LOG IU/ML
LABORATORY COMMENT REPORT: DETECTED

## 2025-07-31 ENCOUNTER — HOSPITAL ENCOUNTER (OUTPATIENT)
Dept: CARDIOLOGY | Facility: HOSPITAL | Age: 40
Discharge: HOME | End: 2025-07-31
Payer: COMMERCIAL

## 2025-07-31 DIAGNOSIS — R06.02 SOB (SHORTNESS OF BREATH): ICD-10-CM

## 2025-07-31 DIAGNOSIS — I50.32 CHRONIC DIASTOLIC CONGESTIVE HEART FAILURE: Primary | ICD-10-CM

## 2025-07-31 DIAGNOSIS — Z94.0 KIDNEY REPLACED BY TRANSPLANT (HHS-HCC): ICD-10-CM

## 2025-07-31 LAB
AORTIC VALVE MEAN GRADIENT: 5 MMHG
AORTIC VALVE PEAK VELOCITY: 1.75 M/S
AV PEAK GRADIENT: 12 MMHG
AVA (PEAK VEL): 3.21 CM2
AVA (VTI): 3.49 CM2
EJECTION FRACTION APICAL 4 CHAMBER: 63.4
EJECTION FRACTION: 63 %
H CAPSUL AG UR QL: NOT DETECTED
LEFT ATRIUM VOLUME AREA LENGTH INDEX BSA: 28.9 ML/M2
LEFT VENTRICLE INTERNAL DIMENSION DIASTOLE: 5.6 CM (ref 3.5–6)
LEFT VENTRICULAR OUTFLOW TRACT DIAMETER: 2.2 CM
MITRAL VALVE E/A RATIO: 1.5
RIGHT VENTRICLE FREE WALL PEAK S': 17.3 CM/S
SCAN RESULT: NORMAL
TRICUSPID ANNULAR PLANE SYSTOLIC EXCURSION: 2.9 CM

## 2025-07-31 PROCEDURE — 93306 TTE W/DOPPLER COMPLETE: CPT

## 2025-07-31 PROCEDURE — 93306 TTE W/DOPPLER COMPLETE: CPT | Performed by: INTERNAL MEDICINE

## 2025-08-01 DIAGNOSIS — Z94.0 KIDNEY REPLACED BY TRANSPLANT (HHS-HCC): ICD-10-CM

## 2025-08-04 DIAGNOSIS — Z94.0 KIDNEY REPLACED BY TRANSPLANT (HHS-HCC): ICD-10-CM

## 2025-08-05 ENCOUNTER — NURSE ONLY (OUTPATIENT)
Facility: HOSPITAL | Age: 40
End: 2025-08-05
Payer: COMMERCIAL

## 2025-08-05 LAB
ALBUMIN SERPL BCP-MCNC: 4.2 G/DL (ref 3.4–5)
ANION GAP SERPL CALC-SCNC: 13 MMOL/L (ref 10–20)
BUN SERPL-MCNC: 21 MG/DL (ref 6–23)
CALCIUM SERPL-MCNC: 9.7 MG/DL (ref 8.6–10.6)
CHLORIDE SERPL-SCNC: 106 MMOL/L (ref 98–107)
CO2 SERPL-SCNC: 22 MMOL/L (ref 21–32)
CREAT SERPL-MCNC: 2.21 MG/DL (ref 0.5–1.3)
EGFRCR SERPLBLD CKD-EPI 2021: 37 ML/MIN/1.73M*2
ERYTHROCYTE [DISTWIDTH] IN BLOOD BY AUTOMATED COUNT: 14.5 % (ref 11.5–14.5)
GLUCOSE SERPL-MCNC: 204 MG/DL (ref 74–99)
HCT VFR BLD AUTO: 41.6 % (ref 41–52)
HGB BLD-MCNC: 11.5 G/DL (ref 13.5–17.5)
MAGNESIUM SERPL-MCNC: 1.69 MG/DL (ref 1.6–2.4)
MCH RBC QN AUTO: 22.7 PG (ref 26–34)
MCHC RBC AUTO-ENTMCNC: 27.6 G/DL (ref 32–36)
MCV RBC AUTO: 82 FL (ref 80–100)
NRBC BLD-RTO: 0 /100 WBCS (ref 0–0)
PHOSPHATE SERPL-MCNC: 2.7 MG/DL (ref 2.5–4.9)
PLATELET # BLD AUTO: 239 X10*3/UL (ref 150–450)
POTASSIUM SERPL-SCNC: 4.3 MMOL/L (ref 3.5–5.3)
RBC # BLD AUTO: 5.07 X10*6/UL (ref 4.5–5.9)
SODIUM SERPL-SCNC: 137 MMOL/L (ref 136–145)
TACROLIMUS BLD-MCNC: 7.5 NG/ML
WBC # BLD AUTO: 3.5 X10*3/UL (ref 4.4–11.3)

## 2025-08-05 PROCEDURE — 83735 ASSAY OF MAGNESIUM: CPT | Performed by: STUDENT IN AN ORGANIZED HEALTH CARE EDUCATION/TRAINING PROGRAM

## 2025-08-05 PROCEDURE — 80069 RENAL FUNCTION PANEL: CPT | Performed by: STUDENT IN AN ORGANIZED HEALTH CARE EDUCATION/TRAINING PROGRAM

## 2025-08-05 PROCEDURE — 85027 COMPLETE CBC AUTOMATED: CPT | Performed by: STUDENT IN AN ORGANIZED HEALTH CARE EDUCATION/TRAINING PROGRAM

## 2025-08-05 PROCEDURE — 36415 COLL VENOUS BLD VENIPUNCTURE: CPT

## 2025-08-05 PROCEDURE — 80197 ASSAY OF TACROLIMUS: CPT | Performed by: STUDENT IN AN ORGANIZED HEALTH CARE EDUCATION/TRAINING PROGRAM

## 2025-08-06 DIAGNOSIS — Z94.0 KIDNEY REPLACED BY TRANSPLANT (HHS-HCC): ICD-10-CM

## 2025-08-08 DIAGNOSIS — Z94.0 KIDNEY REPLACED BY TRANSPLANT (HHS-HCC): ICD-10-CM

## 2025-08-11 DIAGNOSIS — Z94.0 KIDNEY REPLACED BY TRANSPLANT (HHS-HCC): ICD-10-CM

## 2025-08-12 ENCOUNTER — OFFICE VISIT (OUTPATIENT)
Facility: HOSPITAL | Age: 40
End: 2025-08-12
Payer: MEDICARE

## 2025-08-12 ENCOUNTER — APPOINTMENT (OUTPATIENT)
Facility: HOSPITAL | Age: 40
End: 2025-08-12
Payer: MEDICARE

## 2025-08-12 VITALS
BODY MASS INDEX: 45.86 KG/M2 | DIASTOLIC BLOOD PRESSURE: 82 MMHG | SYSTOLIC BLOOD PRESSURE: 170 MMHG | TEMPERATURE: 95.9 F | OXYGEN SATURATION: 96 % | HEART RATE: 63 BPM | WEIGHT: 315 LBS

## 2025-08-12 DIAGNOSIS — B34.8 BK VIREMIA: ICD-10-CM

## 2025-08-12 DIAGNOSIS — D84.9 IMMUNOSUPPRESSION: ICD-10-CM

## 2025-08-12 DIAGNOSIS — E87.5 HYPERKALEMIA: ICD-10-CM

## 2025-08-12 DIAGNOSIS — Z94.0 KIDNEY REPLACED BY TRANSPLANT (HHS-HCC): Primary | ICD-10-CM

## 2025-08-12 DIAGNOSIS — E87.20 ACIDOSIS: ICD-10-CM

## 2025-08-12 DIAGNOSIS — Z79.899 ENCOUNTER FOR LONG-TERM (CURRENT) USE OF HIGH-RISK MEDICATION: ICD-10-CM

## 2025-08-12 DIAGNOSIS — I15.1 HYPERTENSION SECONDARY TO OTHER RENAL DISORDERS: ICD-10-CM

## 2025-08-12 DIAGNOSIS — Z48.298 AFTERCARE FOLLOWING ORGAN TRANSPLANT: ICD-10-CM

## 2025-08-12 PROBLEM — Z95.828: Status: ACTIVE | Noted: 2023-10-03

## 2025-08-12 PROBLEM — E87.70 HYPERVOLEMIA: Status: ACTIVE | Noted: 2020-09-30

## 2025-08-12 PROBLEM — N18.5 ANEMIA OF CHRONIC RENAL FAILURE, STAGE 5: Status: ACTIVE | Noted: 2020-07-13

## 2025-08-12 PROBLEM — N18.5: Status: ACTIVE | Noted: 2020-07-13

## 2025-08-12 PROBLEM — G47.33 OBSTRUCTIVE SLEEP APNEA SYNDROME: Status: ACTIVE | Noted: 2021-02-08

## 2025-08-12 PROBLEM — D63.1 ANEMIA OF CHRONIC RENAL FAILURE, STAGE 5: Status: ACTIVE | Noted: 2020-07-13

## 2025-08-12 PROBLEM — I12.0: Status: ACTIVE | Noted: 2020-07-13

## 2025-08-12 PROBLEM — E83.52 HYPERCALCEMIA: Status: ACTIVE | Noted: 2021-12-09

## 2025-08-12 PROBLEM — Z94.9 ORGAN OR TISSUE REPLACED BY TRANSPLANT: Status: ACTIVE | Noted: 2023-10-03

## 2025-08-12 PROBLEM — E11.22: Status: ACTIVE | Noted: 2020-07-13

## 2025-08-12 LAB
ALBUMIN SERPL BCP-MCNC: 4.3 G/DL (ref 3.4–5)
ANION GAP SERPL CALC-SCNC: 12 MMOL/L (ref 10–20)
BUN SERPL-MCNC: 27 MG/DL (ref 6–23)
CALCIUM SERPL-MCNC: 9.6 MG/DL (ref 8.6–10.6)
CHLORIDE SERPL-SCNC: 108 MMOL/L (ref 98–107)
CO2 SERPL-SCNC: 21 MMOL/L (ref 21–32)
CREAT SERPL-MCNC: 2.36 MG/DL (ref 0.5–1.3)
EGFRCR SERPLBLD CKD-EPI 2021: 34 ML/MIN/1.73M*2
ERYTHROCYTE [DISTWIDTH] IN BLOOD BY AUTOMATED COUNT: 14.9 % (ref 11.5–14.5)
GLUCOSE SERPL-MCNC: 175 MG/DL (ref 74–99)
HCT VFR BLD AUTO: 42.4 % (ref 41–52)
HGB BLD-MCNC: 12.1 G/DL (ref 13.5–17.5)
MAGNESIUM SERPL-MCNC: 1.54 MG/DL (ref 1.6–2.4)
MCH RBC QN AUTO: 23 PG (ref 26–34)
MCHC RBC AUTO-ENTMCNC: 28.5 G/DL (ref 32–36)
MCV RBC AUTO: 81 FL (ref 80–100)
NRBC BLD-RTO: 0 /100 WBCS (ref 0–0)
PHOSPHATE SERPL-MCNC: 2.8 MG/DL (ref 2.5–4.9)
PLATELET # BLD AUTO: 231 X10*3/UL (ref 150–450)
POTASSIUM SERPL-SCNC: 4.2 MMOL/L (ref 3.5–5.3)
RBC # BLD AUTO: 5.27 X10*6/UL (ref 4.5–5.9)
SODIUM SERPL-SCNC: 137 MMOL/L (ref 136–145)
TACROLIMUS BLD-MCNC: 11 NG/ML
WBC # BLD AUTO: 4.3 X10*3/UL (ref 4.4–11.3)

## 2025-08-12 PROCEDURE — 80197 ASSAY OF TACROLIMUS: CPT | Performed by: STUDENT IN AN ORGANIZED HEALTH CARE EDUCATION/TRAINING PROGRAM

## 2025-08-12 PROCEDURE — 99215 OFFICE O/P EST HI 40 MIN: CPT

## 2025-08-12 PROCEDURE — 3079F DIAST BP 80-89 MM HG: CPT | Performed by: INTERNAL MEDICINE

## 2025-08-12 PROCEDURE — 36415 COLL VENOUS BLD VENIPUNCTURE: CPT

## 2025-08-12 PROCEDURE — 87799 DETECT AGENT NOS DNA QUANT: CPT | Performed by: TRANSPLANT SURGERY

## 2025-08-12 PROCEDURE — 80069 RENAL FUNCTION PANEL: CPT | Performed by: STUDENT IN AN ORGANIZED HEALTH CARE EDUCATION/TRAINING PROGRAM

## 2025-08-12 PROCEDURE — G2211 COMPLEX E/M VISIT ADD ON: HCPCS

## 2025-08-12 PROCEDURE — 99215 OFFICE O/P EST HI 40 MIN: CPT | Mod: AF

## 2025-08-12 PROCEDURE — 83735 ASSAY OF MAGNESIUM: CPT | Performed by: STUDENT IN AN ORGANIZED HEALTH CARE EDUCATION/TRAINING PROGRAM

## 2025-08-12 PROCEDURE — 85027 COMPLETE CBC AUTOMATED: CPT | Performed by: STUDENT IN AN ORGANIZED HEALTH CARE EDUCATION/TRAINING PROGRAM

## 2025-08-12 PROCEDURE — 3077F SYST BP >= 140 MM HG: CPT | Performed by: INTERNAL MEDICINE

## 2025-08-12 ASSESSMENT — ENCOUNTER SYMPTOMS
LOSS OF SENSATION IN FEET: 0
OCCASIONAL FEELINGS OF UNSTEADINESS: 0
DEPRESSION: 0

## 2025-08-13 ENCOUNTER — PATIENT MESSAGE (OUTPATIENT)
Facility: HOSPITAL | Age: 40
End: 2025-08-13
Payer: COMMERCIAL

## 2025-08-13 ENCOUNTER — APPOINTMENT (OUTPATIENT)
Dept: CARDIOLOGY | Facility: HOSPITAL | Age: 40
End: 2025-08-13
Payer: MEDICARE

## 2025-08-13 DIAGNOSIS — Z94.0 KIDNEY REPLACED BY TRANSPLANT (HHS-HCC): ICD-10-CM

## 2025-08-13 DIAGNOSIS — Z01.810 PRE-OPERATIVE CARDIOVASCULAR EXAMINATION: Primary | ICD-10-CM

## 2025-08-13 DIAGNOSIS — E11.65 TYPE 2 DIABETES MELLITUS WITH HYPERGLYCEMIA, WITH LONG-TERM CURRENT USE OF INSULIN: Chronic | ICD-10-CM

## 2025-08-13 DIAGNOSIS — Z79.4 TYPE 2 DIABETES MELLITUS WITH HYPERGLYCEMIA, WITH LONG-TERM CURRENT USE OF INSULIN: Chronic | ICD-10-CM

## 2025-08-13 LAB
BKV DNA SERPL NAA+PROBE-ACNC: 4910 IU/ML (ref ?–22)
BKV DNA SERPL NAA+PROBE-LOG#: 3.69 LOG IU/ML
LABORATORY COMMENT REPORT: DETECTED

## 2025-08-14 ENCOUNTER — OFFICE VISIT (OUTPATIENT)
Facility: HOSPITAL | Age: 40
End: 2025-08-14
Payer: MEDICARE

## 2025-08-14 DIAGNOSIS — Z79.4 TYPE 2 DIABETES MELLITUS WITH HYPERGLYCEMIA, WITH LONG-TERM CURRENT USE OF INSULIN: Primary | Chronic | ICD-10-CM

## 2025-08-14 DIAGNOSIS — I50.32 CHRONIC DIASTOLIC CONGESTIVE HEART FAILURE: ICD-10-CM

## 2025-08-14 DIAGNOSIS — E11.65 TYPE 2 DIABETES MELLITUS WITH HYPERGLYCEMIA, WITH LONG-TERM CURRENT USE OF INSULIN: Primary | Chronic | ICD-10-CM

## 2025-08-14 DIAGNOSIS — G47.33 OBSTRUCTIVE SLEEP APNEA SYNDROME: ICD-10-CM

## 2025-08-14 DIAGNOSIS — T38.0X5A STEROID-INDUCED HYPERGLYCEMIA: ICD-10-CM

## 2025-08-14 DIAGNOSIS — R73.9 STEROID-INDUCED HYPERGLYCEMIA: ICD-10-CM

## 2025-08-14 DIAGNOSIS — Z94.0 S/P KIDNEY TRANSPLANT (HHS-HCC): ICD-10-CM

## 2025-08-14 PROCEDURE — 99214 OFFICE O/P EST MOD 30 MIN: CPT | Performed by: PHYSICIAN ASSISTANT

## 2025-08-14 PROCEDURE — 99214 OFFICE O/P EST MOD 30 MIN: CPT | Mod: GC,95 | Performed by: PHYSICIAN ASSISTANT

## 2025-08-14 PROCEDURE — 95251 CONT GLUC MNTR ANALYSIS I&R: CPT | Performed by: PHYSICIAN ASSISTANT

## 2025-08-14 RX ORDER — TIRZEPATIDE 2.5 MG/.5ML
2.5 INJECTION, SOLUTION SUBCUTANEOUS
Qty: 4 PEN | Refills: 0 | Status: SHIPPED | OUTPATIENT
Start: 2025-08-28 | End: 2025-09-27

## 2025-08-14 RX ORDER — TIRZEPATIDE 5 MG/.5ML
5 INJECTION, SOLUTION SUBCUTANEOUS
Qty: 2 ML | Refills: 5 | Status: SHIPPED | OUTPATIENT
Start: 2025-09-28

## 2025-08-14 ASSESSMENT — ENCOUNTER SYMPTOMS: FATIGUE: 0

## 2025-08-15 DIAGNOSIS — Z94.0 KIDNEY REPLACED BY TRANSPLANT (HHS-HCC): ICD-10-CM

## 2025-08-18 DIAGNOSIS — Z94.0 KIDNEY REPLACED BY TRANSPLANT (HHS-HCC): ICD-10-CM

## 2025-08-19 ENCOUNTER — NURSE ONLY (OUTPATIENT)
Facility: HOSPITAL | Age: 40
End: 2025-08-19
Payer: COMMERCIAL

## 2025-08-19 DIAGNOSIS — Z94.0 KIDNEY REPLACED BY TRANSPLANT (HHS-HCC): ICD-10-CM

## 2025-08-19 LAB
ALBUMIN SERPL BCP-MCNC: 4.2 G/DL (ref 3.4–5)
ANION GAP SERPL CALC-SCNC: 12 MMOL/L (ref 10–20)
BUN SERPL-MCNC: 25 MG/DL (ref 6–23)
CALCIUM SERPL-MCNC: 9.6 MG/DL (ref 8.6–10.6)
CHLORIDE SERPL-SCNC: 108 MMOL/L (ref 98–107)
CO2 SERPL-SCNC: 22 MMOL/L (ref 21–32)
CREAT SERPL-MCNC: 2.21 MG/DL (ref 0.5–1.3)
CREAT UR-MCNC: 66.8 MG/DL (ref 20–370)
EGFRCR SERPLBLD CKD-EPI 2021: 37 ML/MIN/1.73M*2
ERYTHROCYTE [DISTWIDTH] IN BLOOD BY AUTOMATED COUNT: 14.8 % (ref 11.5–14.5)
GLUCOSE SERPL-MCNC: 165 MG/DL (ref 74–99)
HCT VFR BLD AUTO: 43.9 % (ref 41–52)
HGB BLD-MCNC: 12.3 G/DL (ref 13.5–17.5)
MAGNESIUM SERPL-MCNC: 1.72 MG/DL (ref 1.6–2.4)
MCH RBC QN AUTO: 22.5 PG (ref 26–34)
MCHC RBC AUTO-ENTMCNC: 28 G/DL (ref 32–36)
MCV RBC AUTO: 80 FL (ref 80–100)
NRBC BLD-RTO: 0 /100 WBCS (ref 0–0)
PHOSPHATE SERPL-MCNC: 2.4 MG/DL (ref 2.5–4.9)
PLATELET # BLD AUTO: 194 X10*3/UL (ref 150–450)
POTASSIUM SERPL-SCNC: 4.4 MMOL/L (ref 3.5–5.3)
PROT UR-ACNC: 39 MG/DL (ref 5–25)
PROT/CREAT UR: 0.58 MG/MG CREAT (ref 0–0.17)
RBC # BLD AUTO: 5.47 X10*6/UL (ref 4.5–5.9)
SODIUM SERPL-SCNC: 138 MMOL/L (ref 136–145)
TACROLIMUS BLD-MCNC: 7.6 NG/ML
WBC # BLD AUTO: 4 X10*3/UL (ref 4.4–11.3)

## 2025-08-19 PROCEDURE — 87385 HISTOPLASMA CAPSUL AG IA: CPT | Performed by: INTERNAL MEDICINE

## 2025-08-19 PROCEDURE — 80069 RENAL FUNCTION PANEL: CPT | Performed by: STUDENT IN AN ORGANIZED HEALTH CARE EDUCATION/TRAINING PROGRAM

## 2025-08-19 PROCEDURE — 82570 ASSAY OF URINE CREATININE: CPT | Performed by: INTERNAL MEDICINE

## 2025-08-19 PROCEDURE — 80197 ASSAY OF TACROLIMUS: CPT | Performed by: STUDENT IN AN ORGANIZED HEALTH CARE EDUCATION/TRAINING PROGRAM

## 2025-08-19 PROCEDURE — 85027 COMPLETE CBC AUTOMATED: CPT | Performed by: STUDENT IN AN ORGANIZED HEALTH CARE EDUCATION/TRAINING PROGRAM

## 2025-08-19 PROCEDURE — 36415 COLL VENOUS BLD VENIPUNCTURE: CPT

## 2025-08-19 PROCEDURE — 87799 DETECT AGENT NOS DNA QUANT: CPT

## 2025-08-19 PROCEDURE — 83735 ASSAY OF MAGNESIUM: CPT | Performed by: STUDENT IN AN ORGANIZED HEALTH CARE EDUCATION/TRAINING PROGRAM

## 2025-08-20 ENCOUNTER — TELEPHONE (OUTPATIENT)
Facility: HOSPITAL | Age: 40
End: 2025-08-20
Payer: COMMERCIAL

## 2025-08-20 DIAGNOSIS — R10.9 RIGHT-SIDED ABDOMINAL PAIN OF UNKNOWN CAUSE: ICD-10-CM

## 2025-08-20 DIAGNOSIS — Z94.0 KIDNEY REPLACED BY TRANSPLANT (HHS-HCC): ICD-10-CM

## 2025-08-20 DIAGNOSIS — J45.909 ASTHMA, UNSPECIFIED ASTHMA SEVERITY, UNSPECIFIED WHETHER COMPLICATED, UNSPECIFIED WHETHER PERSISTENT (HHS-HCC): ICD-10-CM

## 2025-08-20 LAB
BKV DNA SERPL NAA+PROBE-ACNC: 5830 IU/ML (ref ?–22)
BKV DNA SERPL NAA+PROBE-LOG#: 3.77 LOG IU/ML
CMV DNA SERPL NAA+PROBE-LOG IU: NORMAL {LOG_IU}/ML
EBV DNA SPEC NAA+PROBE-LOG#: NORMAL {LOG_COPIES}/ML
LABORATORY COMMENT REPORT: DETECTED
LABORATORY COMMENT REPORT: NOT DETECTED
LABORATORY COMMENT REPORT: NOT DETECTED

## 2025-08-21 ENCOUNTER — PATIENT MESSAGE (OUTPATIENT)
Facility: HOSPITAL | Age: 40
End: 2025-08-21
Payer: COMMERCIAL

## 2025-08-21 DIAGNOSIS — K21.9 GASTROESOPHAGEAL REFLUX DISEASE, UNSPECIFIED WHETHER ESOPHAGITIS PRESENT: ICD-10-CM

## 2025-08-21 LAB
ALLOSURE SCORE - KIDNEY: < 0.04 %
CAREDX_ORDER_ID: NORMAL
CENTER_ORDER_ID: NORMAL
CLIENT SPECIMEN ID - ALLOSURE: NORMAL
DONOR RELATION - ALLOSURE: NORMAL
H CAPSUL AG UR QL: NOT DETECTED
NOTES - ALLOSURE: NORMAL
RELATIVE CHANGE VALUE - KIDNEY: NORMAL
SCAN RESULT: NORMAL
TEST COMMENTS - ALLOSURE: NORMAL
TIME POST TX - ALLOSURE: NORMAL
TRANSPLANTED ORGAN - ALLOSURE: NORMAL
TX DATE - ALLOSURE/ALLOMAP: NORMAL
WP_ORDER_ID: NORMAL

## 2025-08-21 PROCEDURE — RXMED WILLOW AMBULATORY MEDICATION CHARGE

## 2025-08-21 RX ORDER — ALBUTEROL SULFATE 90 UG/1
2 INHALANT RESPIRATORY (INHALATION) EVERY 4 HOURS PRN
Qty: 18 G | Refills: 2 | Status: SHIPPED | OUTPATIENT
Start: 2025-08-21

## 2025-08-21 RX ORDER — PANTOPRAZOLE SODIUM 40 MG/1
40 TABLET, DELAYED RELEASE ORAL 2 TIMES DAILY
Qty: 60 TABLET | Refills: 1 | Status: SHIPPED | OUTPATIENT
Start: 2025-08-21 | End: 2025-10-20

## 2025-08-22 DIAGNOSIS — Z94.0 KIDNEY REPLACED BY TRANSPLANT (HHS-HCC): ICD-10-CM

## 2025-08-22 DIAGNOSIS — Z13.89 SCREENING FOR BLOOD OR PROTEIN IN URINE: ICD-10-CM

## 2025-08-25 ENCOUNTER — TELEPHONE (OUTPATIENT)
Facility: HOSPITAL | Age: 40
End: 2025-08-25
Payer: COMMERCIAL

## 2025-08-25 DIAGNOSIS — Z13.89 SCREENING FOR BLOOD OR PROTEIN IN URINE: ICD-10-CM

## 2025-08-25 DIAGNOSIS — Z94.0 KIDNEY REPLACED BY TRANSPLANT (HHS-HCC): ICD-10-CM

## 2025-08-26 ENCOUNTER — APPOINTMENT (OUTPATIENT)
Dept: RADIOLOGY | Facility: HOSPITAL | Age: 40
End: 2025-08-26
Payer: COMMERCIAL

## 2025-08-26 ENCOUNTER — APPOINTMENT (OUTPATIENT)
Dept: RADIOLOGY | Facility: HOSPITAL | Age: 40
End: 2025-08-26
Payer: MEDICARE

## 2025-08-26 ENCOUNTER — TELEPHONE (OUTPATIENT)
Facility: HOSPITAL | Age: 40
End: 2025-08-26
Payer: COMMERCIAL

## 2025-08-26 ENCOUNTER — CLINICAL SUPPORT (OUTPATIENT)
Dept: EMERGENCY MEDICINE | Facility: HOSPITAL | Age: 40
End: 2025-08-26
Payer: MEDICARE

## 2025-08-26 ENCOUNTER — HOSPITAL ENCOUNTER (INPATIENT)
Facility: HOSPITAL | Age: 40
LOS: 2 days | Discharge: HOME | End: 2025-08-28
Attending: EMERGENCY MEDICINE | Admitting: TRANSPLANT SURGERY
Payer: MEDICARE

## 2025-08-26 DIAGNOSIS — Z94.0 KIDNEY REPLACED BY TRANSPLANT (HHS-HCC): ICD-10-CM

## 2025-08-26 PROBLEM — R50.9 FEVER AND CHILLS: Status: ACTIVE | Noted: 2025-08-26

## 2025-08-26 ASSESSMENT — PAIN SCALES - GENERAL: PAINLEVEL_OUTOF10: 7

## 2025-08-26 ASSESSMENT — PAIN - FUNCTIONAL ASSESSMENT: PAIN_FUNCTIONAL_ASSESSMENT: 0-10

## 2025-08-26 ASSESSMENT — PAIN DESCRIPTION - DESCRIPTORS: DESCRIPTORS: ACHING

## 2025-08-26 ASSESSMENT — PAIN DESCRIPTION - PAIN TYPE: TYPE: ACUTE PAIN

## 2025-08-27 PROBLEM — E66.813 CLASS 3 SEVERE OBESITY WITH SERIOUS COMORBIDITY AND BODY MASS INDEX (BMI) OF 45.0 TO 49.9 IN ADULT: Chronic | Status: ACTIVE | Noted: 2025-08-27

## 2025-08-27 PROBLEM — Z94.0 KIDNEY REPLACED BY TRANSPLANT (HHS-HCC): Chronic | Status: ACTIVE | Noted: 2025-08-27

## 2025-08-27 ASSESSMENT — PAIN - FUNCTIONAL ASSESSMENT
PAIN_FUNCTIONAL_ASSESSMENT: 0-10
PAIN_FUNCTIONAL_ASSESSMENT: 0-10

## 2025-08-27 ASSESSMENT — PAIN SCALES - GENERAL
PAINLEVEL_OUTOF10: 0 - NO PAIN
PAINLEVEL_OUTOF10: 9
PAINLEVEL_OUTOF10: 4

## 2025-08-27 ASSESSMENT — ENCOUNTER SYMPTOMS
ACTIVITY CHANGE: 1
APPETITE CHANGE: 1
FATIGUE: 1

## 2025-08-27 ASSESSMENT — PAIN DESCRIPTION - LOCATION: LOCATION: GENERALIZED

## 2025-08-28 ENCOUNTER — SPECIALTY PHARMACY (OUTPATIENT)
Dept: PHARMACY | Facility: CLINIC | Age: 40
End: 2025-08-28

## 2025-08-28 PROCEDURE — RXMED WILLOW AMBULATORY MEDICATION CHARGE

## 2025-08-29 ENCOUNTER — PHARMACY VISIT (OUTPATIENT)
Dept: PHARMACY | Facility: CLINIC | Age: 40
End: 2025-08-29
Payer: COMMERCIAL

## 2025-08-29 ENCOUNTER — APPOINTMENT (OUTPATIENT)
Dept: RADIOLOGY | Facility: HOSPITAL | Age: 40
End: 2025-08-29
Payer: COMMERCIAL

## 2025-08-29 DIAGNOSIS — Z94.0 KIDNEY REPLACED BY TRANSPLANT (HHS-HCC): ICD-10-CM

## 2025-08-30 ENCOUNTER — RESULTS FOLLOW-UP (OUTPATIENT)
Dept: PHARMACY | Facility: HOSPITAL | Age: 40
End: 2025-08-30
Payer: COMMERCIAL

## 2025-09-02 ENCOUNTER — NURSE ONLY (OUTPATIENT)
Facility: HOSPITAL | Age: 40
End: 2025-09-02
Payer: MEDICARE

## 2025-09-02 ENCOUNTER — SPECIALTY PHARMACY (OUTPATIENT)
Dept: PHARMACY | Facility: CLINIC | Age: 40
End: 2025-09-02

## 2025-09-02 DIAGNOSIS — Z94.0 KIDNEY REPLACED BY TRANSPLANT (HHS-HCC): ICD-10-CM

## 2025-09-02 DIAGNOSIS — Z13.89 SCREENING FOR BLOOD OR PROTEIN IN URINE: ICD-10-CM

## 2025-09-02 LAB
ALBUMIN SERPL BCP-MCNC: 4.1 G/DL (ref 3.4–5)
ANION GAP SERPL CALC-SCNC: 14 MMOL/L (ref 10–20)
BUN SERPL-MCNC: 27 MG/DL (ref 6–23)
CALCIUM SERPL-MCNC: 10.1 MG/DL (ref 8.6–10.6)
CHLORIDE SERPL-SCNC: 110 MMOL/L (ref 98–107)
CO2 SERPL-SCNC: 21 MMOL/L (ref 21–32)
CREAT SERPL-MCNC: 2.68 MG/DL (ref 0.5–1.3)
CREAT UR-MCNC: 81.7 MG/DL (ref 20–370)
EGFRCR SERPLBLD CKD-EPI 2021: 30 ML/MIN/1.73M*2
ERYTHROCYTE [DISTWIDTH] IN BLOOD BY AUTOMATED COUNT: 15.1 % (ref 11.5–14.5)
GLUCOSE SERPL-MCNC: 79 MG/DL (ref 74–99)
HCT VFR BLD AUTO: 46.3 % (ref 41–52)
HGB BLD-MCNC: 12.7 G/DL (ref 13.5–17.5)
MAGNESIUM SERPL-MCNC: 1.66 MG/DL (ref 1.6–2.4)
MCH RBC QN AUTO: 22 PG (ref 26–34)
MCHC RBC AUTO-ENTMCNC: 27.4 G/DL (ref 32–36)
MCV RBC AUTO: 80 FL (ref 80–100)
NRBC BLD-RTO: 0 /100 WBCS (ref 0–0)
PHOSPHATE SERPL-MCNC: 3.8 MG/DL (ref 2.5–4.9)
PLATELET # BLD AUTO: 248 X10*3/UL (ref 150–450)
POTASSIUM SERPL-SCNC: 4.7 MMOL/L (ref 3.5–5.3)
PROT UR-ACNC: 39 MG/DL (ref 5–25)
PROT/CREAT UR: 0.48 MG/MG CREAT (ref 0–0.17)
RBC # BLD AUTO: 5.77 X10*6/UL (ref 4.5–5.9)
SODIUM SERPL-SCNC: 140 MMOL/L (ref 136–145)
TACROLIMUS BLD-MCNC: 11 NG/ML
WBC # BLD AUTO: 4.4 X10*3/UL (ref 4.4–11.3)

## 2025-09-02 PROCEDURE — 84132 ASSAY OF SERUM POTASSIUM: CPT | Performed by: INTERNAL MEDICINE

## 2025-09-02 PROCEDURE — 85027 COMPLETE CBC AUTOMATED: CPT | Performed by: INTERNAL MEDICINE

## 2025-09-02 PROCEDURE — 87799 DETECT AGENT NOS DNA QUANT: CPT | Performed by: INTERNAL MEDICINE

## 2025-09-02 PROCEDURE — 84156 ASSAY OF PROTEIN URINE: CPT

## 2025-09-02 PROCEDURE — 80197 ASSAY OF TACROLIMUS: CPT | Performed by: INTERNAL MEDICINE

## 2025-09-02 PROCEDURE — 87385 HISTOPLASMA CAPSUL AG IA: CPT | Performed by: INTERNAL MEDICINE

## 2025-09-02 PROCEDURE — 83735 ASSAY OF MAGNESIUM: CPT | Performed by: INTERNAL MEDICINE

## 2025-09-03 LAB
BKV DNA SERPL NAA+PROBE-ACNC: 2230 IU/ML (ref ?–22)
BKV DNA SERPL NAA+PROBE-LOG#: 3.35 LOG IU/ML
CMV DNA SERPL NAA+PROBE-ACNC: 52 IU/ML (ref ?–35)
CMV DNA SERPL NAA+PROBE-LOG IU: 1.72 LOG IU/ML
EBV DNA SPEC NAA+PROBE-LOG#: NORMAL {LOG_COPIES}/ML
LABORATORY COMMENT REPORT: DETECTED
LABORATORY COMMENT REPORT: DETECTED
LABORATORY COMMENT REPORT: NOT DETECTED

## 2025-09-05 ENCOUNTER — APPOINTMENT (OUTPATIENT)
Dept: RADIOLOGY | Facility: CLINIC | Age: 40
End: 2025-09-05
Payer: MEDICARE

## 2025-09-10 ENCOUNTER — APPOINTMENT (OUTPATIENT)
Dept: RADIOLOGY | Facility: CLINIC | Age: 40
End: 2025-09-10
Payer: COMMERCIAL

## 2025-09-29 ENCOUNTER — APPOINTMENT (OUTPATIENT)
Dept: ORTHOPEDIC SURGERY | Facility: HOSPITAL | Age: 40
End: 2025-09-29
Payer: COMMERCIAL

## (undated) DEVICE — GUIDEWIRE, .035 X 150 PTFE, FIXED CORE, 15CM BENTSON

## (undated) DEVICE — COVER, TABLE, 44 X 75 IN, DISPOSABLE, LF, STERILE

## (undated) DEVICE — PAD, GROUNDING, ELECTROSURGICAL, DUAL

## (undated) DEVICE — COVER, EQUIPMENT, SOLUTION, SLUSH, 112 X 168 CM, LF, STERILE

## (undated) DEVICE — SPONGE, HEMOSTAT, SURGICEL FIBRILLAR, ABS, 4 X 4, LF

## (undated) DEVICE — Device

## (undated) DEVICE — BAG, DECANTER

## (undated) DEVICE — DRAPE, FLUID WARMER

## (undated) DEVICE — TOWEL, OR, XRAY DETECT 5 PK, WHITE, 17X26, W/DMT TAG, ST

## (undated) DEVICE — MANIFOLD, 4 PORT NEPTUNE STANDARD

## (undated) DEVICE — APPLICATOR, CHLORAPREP, W/ORANGE TINT, 26ML

## (undated) DEVICE — CATHETER TRAY, SURESTEP, 16FR, URINE METER W/STATLOCK

## (undated) DEVICE — DRAIN, PENROSE, 0.25 X 18 IN, LATEX, STERILE

## (undated) DEVICE — DRAPE, INCISE, ANTIMICROBIAL, IOBAN 2, LARGE, 17 X 23 IN, DISPOSABLE, STERILE

## (undated) DEVICE — DRAPE, MAGENTIC INSTRUMENT, 12X16